# Patient Record
Sex: FEMALE | Race: WHITE | NOT HISPANIC OR LATINO | Employment: OTHER | URBAN - METROPOLITAN AREA
[De-identification: names, ages, dates, MRNs, and addresses within clinical notes are randomized per-mention and may not be internally consistent; named-entity substitution may affect disease eponyms.]

---

## 2017-01-18 ENCOUNTER — TRANSCRIBE ORDERS (OUTPATIENT)
Dept: ADMINISTRATIVE | Facility: HOSPITAL | Age: 82
End: 2017-01-18

## 2017-01-18 DIAGNOSIS — R13.10 DYSPHAGIA, UNSPECIFIED TYPE: Primary | ICD-10-CM

## 2017-01-27 ENCOUNTER — HOSPITAL ENCOUNTER (OUTPATIENT)
Dept: GENERAL RADIOLOGY | Facility: HOSPITAL | Age: 82
Discharge: HOME | End: 2017-01-27
Attending: INTERNAL MEDICINE | Admitting: INTERNAL MEDICINE

## 2017-01-27 DIAGNOSIS — R13.10 DYSPHAGIA, UNSPECIFIED TYPE: ICD-10-CM

## 2017-01-27 PROCEDURE — 74247: CPT

## 2017-02-06 DIAGNOSIS — R93.89 ABNORMAL FINDING ON RADIOLOGY EXAM: Primary | ICD-10-CM

## 2017-02-13 ENCOUNTER — ANESTHESIA (OUTPATIENT)
Dept: GASTROENTEROLOGY | Facility: HOSPITAL | Age: 82
End: 2017-02-13

## 2017-02-13 ENCOUNTER — ANESTHESIA EVENT (OUTPATIENT)
Dept: GASTROENTEROLOGY | Facility: HOSPITAL | Age: 82
End: 2017-02-13

## 2017-02-13 ENCOUNTER — HOSPITAL ENCOUNTER (OUTPATIENT)
Facility: HOSPITAL | Age: 82
Setting detail: HOSPITAL OUTPATIENT SURGERY
Discharge: HOME | End: 2017-02-13
Attending: INTERNAL MEDICINE | Admitting: INTERNAL MEDICINE

## 2017-02-13 VITALS
SYSTOLIC BLOOD PRESSURE: 151 MMHG | WEIGHT: 167.5 LBS | TEMPERATURE: 97.6 F | DIASTOLIC BLOOD PRESSURE: 71 MMHG | BODY MASS INDEX: 25.39 KG/M2 | RESPIRATION RATE: 16 BRPM | HEIGHT: 68 IN | OXYGEN SATURATION: 95 % | HEART RATE: 64 BPM

## 2017-02-13 DIAGNOSIS — R93.89 ABNORMAL FINDING ON RADIOLOGY EXAM: ICD-10-CM

## 2017-02-13 PROCEDURE — 43239 EGD BIOPSY SINGLE/MULTIPLE: CPT | Performed by: INTERNAL MEDICINE

## 2017-02-13 PROCEDURE — 88305 TISSUE EXAM BY PATHOLOGIST: CPT | Performed by: INTERNAL MEDICINE

## 2017-02-13 PROCEDURE — 43450 DILATE ESOPHAGUS 1/MULT PASS: CPT | Performed by: INTERNAL MEDICINE

## 2017-02-13 PROCEDURE — 88312 SPECIAL STAINS GROUP 1: CPT | Performed by: INTERNAL MEDICINE

## 2017-02-13 PROCEDURE — 25010000002 PROPOFOL 10 MG/ML EMULSION: Performed by: NURSE ANESTHETIST, CERTIFIED REGISTERED

## 2017-02-13 RX ORDER — SODIUM CHLORIDE, SODIUM LACTATE, POTASSIUM CHLORIDE, CALCIUM CHLORIDE 600; 310; 30; 20 MG/100ML; MG/100ML; MG/100ML; MG/100ML
1000 INJECTION, SOLUTION INTRAVENOUS CONTINUOUS PRN
Status: DISCONTINUED | OUTPATIENT
Start: 2017-02-13 | End: 2017-02-13 | Stop reason: HOSPADM

## 2017-02-13 RX ORDER — OMEPRAZOLE 20 MG/1
20 CAPSULE, DELAYED RELEASE ORAL
Qty: 60 CAPSULE | Refills: 3 | Status: SHIPPED | OUTPATIENT
Start: 2017-02-13 | End: 2017-06-19 | Stop reason: SDUPTHER

## 2017-02-13 RX ORDER — PROPOFOL 10 MG/ML
VIAL (ML) INTRAVENOUS CONTINUOUS PRN
Status: DISCONTINUED | OUTPATIENT
Start: 2017-02-13 | End: 2017-02-13 | Stop reason: SURG

## 2017-02-13 RX ORDER — LIDOCAINE HYDROCHLORIDE 20 MG/ML
INJECTION, SOLUTION INFILTRATION; PERINEURAL AS NEEDED
Status: DISCONTINUED | OUTPATIENT
Start: 2017-02-13 | End: 2017-02-13 | Stop reason: SURG

## 2017-02-13 RX ADMIN — PROPOFOL 150 MCG/KG/MIN: 10 INJECTION, EMULSION INTRAVENOUS at 12:20

## 2017-02-13 RX ADMIN — SODIUM CHLORIDE, POTASSIUM CHLORIDE, SODIUM LACTATE AND CALCIUM CHLORIDE 1000 ML: 600; 310; 30; 20 INJECTION, SOLUTION INTRAVENOUS at 12:00

## 2017-02-13 RX ADMIN — LIDOCAINE HYDROCHLORIDE 40 MG: 20 INJECTION, SOLUTION INFILTRATION; PERINEURAL at 12:19

## 2017-02-14 LAB
CYTO UR: NORMAL
LAB AP CASE REPORT: NORMAL
Lab: NORMAL
PATH REPORT.FINAL DX SPEC: NORMAL
PATH REPORT.GROSS SPEC: NORMAL

## 2017-02-15 ENCOUNTER — TELEPHONE (OUTPATIENT)
Dept: GASTROENTEROLOGY | Facility: CLINIC | Age: 82
End: 2017-02-15

## 2017-02-17 ENCOUNTER — DOCUMENTATION (OUTPATIENT)
Dept: GASTROENTEROLOGY | Facility: CLINIC | Age: 82
End: 2017-02-17

## 2017-03-27 ENCOUNTER — OFFICE VISIT (OUTPATIENT)
Dept: GASTROENTEROLOGY | Facility: CLINIC | Age: 82
End: 2017-03-27

## 2017-03-27 VITALS
BODY MASS INDEX: 25.73 KG/M2 | DIASTOLIC BLOOD PRESSURE: 74 MMHG | WEIGHT: 169.8 LBS | HEIGHT: 68 IN | SYSTOLIC BLOOD PRESSURE: 148 MMHG

## 2017-03-27 DIAGNOSIS — R09.89 GLOBUS SENSATION: Primary | ICD-10-CM

## 2017-03-27 DIAGNOSIS — K21.9 GASTROESOPHAGEAL REFLUX DISEASE WITHOUT ESOPHAGITIS: ICD-10-CM

## 2017-03-27 PROCEDURE — 99213 OFFICE O/P EST LOW 20 MIN: CPT | Performed by: NURSE PRACTITIONER

## 2017-06-19 ENCOUNTER — OFFICE VISIT (OUTPATIENT)
Dept: GASTROENTEROLOGY | Facility: CLINIC | Age: 82
End: 2017-06-19

## 2017-06-19 VITALS
SYSTOLIC BLOOD PRESSURE: 116 MMHG | WEIGHT: 170.8 LBS | TEMPERATURE: 98.3 F | BODY MASS INDEX: 25.88 KG/M2 | HEIGHT: 68 IN | DIASTOLIC BLOOD PRESSURE: 64 MMHG

## 2017-06-19 DIAGNOSIS — R09.89 GLOBUS SENSATION: ICD-10-CM

## 2017-06-19 DIAGNOSIS — R49.0 HOARSE VOICE QUALITY: ICD-10-CM

## 2017-06-19 DIAGNOSIS — K21.9 GASTROESOPHAGEAL REFLUX DISEASE WITHOUT ESOPHAGITIS: Primary | ICD-10-CM

## 2017-06-19 PROCEDURE — 99213 OFFICE O/P EST LOW 20 MIN: CPT | Performed by: NURSE PRACTITIONER

## 2017-06-19 RX ORDER — DICYCLOMINE HYDROCHLORIDE 10 MG/1
10 CAPSULE ORAL
Qty: 20 CAPSULE | Refills: 3 | Status: SHIPPED | OUTPATIENT
Start: 2017-06-19 | End: 2019-08-21

## 2017-06-19 RX ORDER — OMEPRAZOLE 20 MG/1
20 CAPSULE, DELAYED RELEASE ORAL 2 TIMES DAILY
Qty: 60 CAPSULE | Refills: 6 | Status: SHIPPED | OUTPATIENT
Start: 2017-06-19

## 2017-07-26 ENCOUNTER — APPOINTMENT (OUTPATIENT)
Dept: WOMENS IMAGING | Facility: HOSPITAL | Age: 82
End: 2017-07-26

## 2017-07-26 PROCEDURE — 77080 DXA BONE DENSITY AXIAL: CPT | Performed by: RADIOLOGY

## 2017-12-18 ENCOUNTER — OFFICE VISIT (OUTPATIENT)
Dept: GASTROENTEROLOGY | Facility: CLINIC | Age: 82
End: 2017-12-18

## 2017-12-18 VITALS
TEMPERATURE: 97.7 F | HEIGHT: 68 IN | WEIGHT: 173.6 LBS | DIASTOLIC BLOOD PRESSURE: 66 MMHG | SYSTOLIC BLOOD PRESSURE: 136 MMHG | BODY MASS INDEX: 26.31 KG/M2

## 2017-12-18 DIAGNOSIS — R09.89 GLOBUS SENSATION: ICD-10-CM

## 2017-12-18 DIAGNOSIS — K21.9 GASTROESOPHAGEAL REFLUX DISEASE WITHOUT ESOPHAGITIS: Primary | ICD-10-CM

## 2017-12-18 PROCEDURE — 99212 OFFICE O/P EST SF 10 MIN: CPT | Performed by: INTERNAL MEDICINE

## 2017-12-19 ENCOUNTER — OFFICE VISIT (OUTPATIENT)
Dept: ORTHOPEDIC SURGERY | Facility: CLINIC | Age: 82
End: 2017-12-19

## 2017-12-19 VITALS — TEMPERATURE: 98.1 F | HEIGHT: 68 IN | BODY MASS INDEX: 26.52 KG/M2 | WEIGHT: 175 LBS

## 2017-12-19 DIAGNOSIS — M25.551 RIGHT HIP PAIN: Primary | ICD-10-CM

## 2017-12-19 PROCEDURE — 99213 OFFICE O/P EST LOW 20 MIN: CPT | Performed by: ORTHOPAEDIC SURGERY

## 2017-12-19 PROCEDURE — 73502 X-RAY EXAM HIP UNI 2-3 VIEWS: CPT | Performed by: ORTHOPAEDIC SURGERY

## 2017-12-19 RX ORDER — SULFAMETHOXAZOLE AND TRIMETHOPRIM 800; 160 MG/1; MG/1
TABLET ORAL
Refills: 0 | COMMUNITY
Start: 2017-12-18 | End: 2019-08-21

## 2017-12-19 RX ORDER — AMITRIPTYLINE HYDROCHLORIDE 10 MG/1
TABLET, FILM COATED ORAL
Refills: 0 | COMMUNITY
Start: 2017-11-16 | End: 2019-08-21

## 2018-07-24 ENCOUNTER — APPOINTMENT (OUTPATIENT)
Dept: WOMENS IMAGING | Facility: HOSPITAL | Age: 83
End: 2018-07-24

## 2018-07-24 PROCEDURE — 77063 BREAST TOMOSYNTHESIS BI: CPT | Performed by: RADIOLOGY

## 2018-07-24 PROCEDURE — 77067 SCR MAMMO BI INCL CAD: CPT | Performed by: RADIOLOGY

## 2019-07-31 ENCOUNTER — APPOINTMENT (OUTPATIENT)
Dept: WOMENS IMAGING | Facility: HOSPITAL | Age: 84
End: 2019-07-31

## 2019-07-31 PROCEDURE — 77080 DXA BONE DENSITY AXIAL: CPT | Performed by: RADIOLOGY

## 2019-07-31 PROCEDURE — 77067 SCR MAMMO BI INCL CAD: CPT | Performed by: RADIOLOGY

## 2019-07-31 PROCEDURE — 77063 BREAST TOMOSYNTHESIS BI: CPT | Performed by: RADIOLOGY

## 2019-08-15 ENCOUNTER — TRANSCRIBE ORDERS (OUTPATIENT)
Dept: ADMINISTRATIVE | Facility: HOSPITAL | Age: 84
End: 2019-08-15

## 2019-08-15 DIAGNOSIS — M81.0 OSTEOPOROSIS, POSTMENOPAUSAL: Primary | ICD-10-CM

## 2019-08-20 PROBLEM — M85.89 OSTEOPENIA OF MULTIPLE SITES: Status: ACTIVE | Noted: 2019-08-20

## 2019-08-20 RX ORDER — ZOLEDRONIC ACID 5 MG/100ML
5 INJECTION, SOLUTION INTRAVENOUS ONCE
Status: CANCELLED | OUTPATIENT
Start: 2019-08-21

## 2019-08-21 ENCOUNTER — HOSPITAL ENCOUNTER (OUTPATIENT)
Dept: INFUSION THERAPY | Facility: HOSPITAL | Age: 84
Discharge: HOME | End: 2019-08-21
Admitting: INTERNAL MEDICINE

## 2019-08-21 VITALS
TEMPERATURE: 98.2 F | BODY MASS INDEX: 25.81 KG/M2 | HEART RATE: 71 BPM | DIASTOLIC BLOOD PRESSURE: 62 MMHG | SYSTOLIC BLOOD PRESSURE: 137 MMHG | WEIGHT: 160 LBS | OXYGEN SATURATION: 95 % | RESPIRATION RATE: 20 BRPM

## 2019-08-21 DIAGNOSIS — M85.89 OSTEOPENIA OF MULTIPLE SITES: Primary | ICD-10-CM

## 2019-08-21 PROCEDURE — 25010000002 ZOLEDRONIC ACID 5 MG/100ML SOLUTION: Performed by: INTERNAL MEDICINE

## 2019-08-21 PROCEDURE — 96374 THER/PROPH/DIAG INJ IV PUSH: CPT

## 2019-08-21 RX ORDER — MELATONIN
1000 DAILY
COMMUNITY

## 2019-08-21 RX ORDER — ZOLEDRONIC ACID 5 MG/100ML
5 INJECTION, SOLUTION INTRAVENOUS ONCE
Status: CANCELLED | OUTPATIENT
Start: 2019-08-21

## 2019-08-21 RX ORDER — ZOLEDRONIC ACID 5 MG/100ML
5 INJECTION, SOLUTION INTRAVENOUS ONCE
COMMUNITY

## 2019-08-21 RX ORDER — CARBOXYMETHYLCELLULOSE SODIUM 5 MG/ML
1 SOLUTION/ DROPS OPHTHALMIC 2 TIMES DAILY PRN
COMMUNITY

## 2019-08-21 RX ORDER — ZOLEDRONIC ACID 5 MG/100ML
5 INJECTION, SOLUTION INTRAVENOUS ONCE
Status: COMPLETED | OUTPATIENT
Start: 2019-08-21 | End: 2019-08-21

## 2019-08-21 RX ORDER — GABAPENTIN 300 MG/1
300 CAPSULE ORAL DAILY
COMMUNITY

## 2019-08-21 RX ADMIN — ZOLEDRONIC ACID 5 MG: 5 INJECTION, SOLUTION INTRAVENOUS at 10:26

## 2021-04-28 ENCOUNTER — APPOINTMENT (OUTPATIENT)
Dept: WOMENS IMAGING | Facility: HOSPITAL | Age: 86
End: 2021-04-28

## 2021-04-28 PROCEDURE — 77067 SCR MAMMO BI INCL CAD: CPT | Performed by: RADIOLOGY

## 2021-04-28 PROCEDURE — 77063 BREAST TOMOSYNTHESIS BI: CPT | Performed by: RADIOLOGY

## 2021-06-24 ENCOUNTER — TRANSCRIBE ORDERS (OUTPATIENT)
Dept: ADMINISTRATIVE | Facility: HOSPITAL | Age: 86
End: 2021-06-24

## 2021-06-24 DIAGNOSIS — M81.0 SENILE OSTEOPOROSIS: Primary | ICD-10-CM

## 2021-08-20 RX ORDER — ZOLEDRONIC ACID 5 MG/100ML
5 INJECTION, SOLUTION INTRAVENOUS ONCE
Status: CANCELLED | OUTPATIENT
Start: 2021-08-20

## 2021-08-24 ENCOUNTER — HOSPITAL ENCOUNTER (OUTPATIENT)
Dept: INFUSION THERAPY | Facility: HOSPITAL | Age: 86
Discharge: HOME | End: 2021-08-24
Admitting: INTERNAL MEDICINE

## 2021-08-24 VITALS
SYSTOLIC BLOOD PRESSURE: 143 MMHG | WEIGHT: 167 LBS | TEMPERATURE: 96.8 F | HEART RATE: 77 BPM | OXYGEN SATURATION: 92 % | RESPIRATION RATE: 18 BRPM | DIASTOLIC BLOOD PRESSURE: 70 MMHG | BODY MASS INDEX: 26.93 KG/M2

## 2021-08-24 DIAGNOSIS — M85.89 OSTEOPENIA OF MULTIPLE SITES: Primary | ICD-10-CM

## 2021-08-24 PROCEDURE — 25010000002 ZOLEDRONIC ACID 5 MG/100ML SOLUTION: Performed by: INTERNAL MEDICINE

## 2021-08-24 PROCEDURE — 96365 THER/PROPH/DIAG IV INF INIT: CPT

## 2021-08-24 RX ORDER — ZOLEDRONIC ACID 5 MG/100ML
5 INJECTION, SOLUTION INTRAVENOUS ONCE
Status: COMPLETED | OUTPATIENT
Start: 2021-08-24 | End: 2021-08-24

## 2021-08-24 RX ORDER — ZOLEDRONIC ACID 5 MG/100ML
5 INJECTION, SOLUTION INTRAVENOUS ONCE
OUTPATIENT
Start: 2022-08-24

## 2021-08-24 RX ADMIN — ZOLEDRONIC ACID 5 MG: 0.05 INJECTION, SOLUTION INTRAVENOUS at 09:42

## 2022-05-13 ENCOUNTER — HOSPITAL ENCOUNTER (EMERGENCY)
Facility: HOSPITAL | Age: 87
Discharge: HOME | End: 2022-05-13
Attending: EMERGENCY MEDICINE | Admitting: EMERGENCY MEDICINE

## 2022-05-13 ENCOUNTER — APPOINTMENT (OUTPATIENT)
Dept: CT IMAGING | Facility: HOSPITAL | Age: 87
End: 2022-05-13

## 2022-05-13 VITALS
HEART RATE: 70 BPM | SYSTOLIC BLOOD PRESSURE: 158 MMHG | OXYGEN SATURATION: 98 % | RESPIRATION RATE: 16 BRPM | DIASTOLIC BLOOD PRESSURE: 76 MMHG | TEMPERATURE: 98 F

## 2022-05-13 DIAGNOSIS — E78.5 HYPERLIPIDEMIA, UNSPECIFIED HYPERLIPIDEMIA TYPE: ICD-10-CM
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
## 2022-05-13 DIAGNOSIS — S09.90XA MINOR HEAD INJURY, INITIAL ENCOUNTER: Primary | ICD-10-CM

## 2022-05-13 DIAGNOSIS — K21.9 GASTROESOPHAGEAL REFLUX DISEASE WITHOUT ESOPHAGITIS: ICD-10-CM

## 2022-05-13 DIAGNOSIS — I10 PRIMARY HYPERTENSION: ICD-10-CM

## 2022-05-13 LAB
ALBUMIN SERPL-MCNC: 3.9 G/DL (ref 3.5–5.2)
ALBUMIN/GLOB SERPL: 1.6 G/DL
ALP SERPL-CCNC: 39 U/L (ref 39–117)
ALT SERPL W P-5'-P-CCNC: 11 U/L (ref 1–33)
ANION GAP SERPL CALCULATED.3IONS-SCNC: 11.8 MMOL/L (ref 5–15)
AST SERPL-CCNC: 18 U/L (ref 1–32)
BASOPHILS # BLD AUTO: 0.03 10*3/MM3 (ref 0–0.2)
BASOPHILS NFR BLD AUTO: 0.5 % (ref 0–1.5)
BILIRUB SERPL-MCNC: 0.3 MG/DL (ref 0–1.2)
BUN SERPL-MCNC: 13 MG/DL (ref 8–23)
BUN/CREAT SERPL: 22 (ref 7–25)
CALCIUM SPEC-SCNC: 9 MG/DL (ref 8.6–10.5)
CHLORIDE SERPL-SCNC: 97 MMOL/L (ref 98–107)
CO2 SERPL-SCNC: 24.2 MMOL/L (ref 22–29)
CREAT SERPL-MCNC: 0.59 MG/DL (ref 0.57–1)
DEPRECATED RDW RBC AUTO: 45.8 FL (ref 37–54)
EGFRCR SERPLBLD CKD-EPI 2021: 86.3 ML/MIN/1.73
EOSINOPHIL # BLD AUTO: 0.15 10*3/MM3 (ref 0–0.4)
EOSINOPHIL NFR BLD AUTO: 2.6 % (ref 0.3–6.2)
ERYTHROCYTE [DISTWIDTH] IN BLOOD BY AUTOMATED COUNT: 12.7 % (ref 12.3–15.4)
ETHANOL BLD-MCNC: 61 MG/DL (ref 0–10)
ETHANOL UR QL: 0.06 %
GLOBULIN UR ELPH-MCNC: 2.5 GM/DL
GLUCOSE SERPL-MCNC: 88 MG/DL (ref 65–99)
HCT VFR BLD AUTO: 39.9 % (ref 34–46.6)
HGB BLD-MCNC: 12.9 G/DL (ref 12–15.9)
IMM GRANULOCYTES # BLD AUTO: 0.02 10*3/MM3 (ref 0–0.05)
IMM GRANULOCYTES NFR BLD AUTO: 0.3 % (ref 0–0.5)
LYMPHOCYTES # BLD AUTO: 1.39 10*3/MM3 (ref 0.7–3.1)
LYMPHOCYTES NFR BLD AUTO: 23.7 % (ref 19.6–45.3)
MCH RBC QN AUTO: 31.7 PG (ref 26.6–33)
MCHC RBC AUTO-ENTMCNC: 32.3 G/DL (ref 31.5–35.7)
MCV RBC AUTO: 98 FL (ref 79–97)
MONOCYTES # BLD AUTO: 0.64 10*3/MM3 (ref 0.1–0.9)
MONOCYTES NFR BLD AUTO: 10.9 % (ref 5–12)
NEUTROPHILS NFR BLD AUTO: 3.63 10*3/MM3 (ref 1.7–7)
NEUTROPHILS NFR BLD AUTO: 62 % (ref 42.7–76)
NRBC BLD AUTO-RTO: 0 /100 WBC (ref 0–0.2)
PLATELET # BLD AUTO: 205 10*3/MM3 (ref 140–450)
PMV BLD AUTO: 11 FL (ref 6–12)
POTASSIUM SERPL-SCNC: 3.8 MMOL/L (ref 3.5–5.2)
PROT SERPL-MCNC: 6.4 G/DL (ref 6–8.5)
RBC # BLD AUTO: 4.07 10*6/MM3 (ref 3.77–5.28)
SODIUM SERPL-SCNC: 133 MMOL/L (ref 136–145)
TROPONIN T SERPL-MCNC: <0.01 NG/ML (ref 0–0.03)
WBC NRBC COR # BLD: 5.86 10*3/MM3 (ref 3.4–10.8)

## 2022-05-13 PROCEDURE — 70450 CT HEAD/BRAIN W/O DYE: CPT

## 2022-05-13 PROCEDURE — 99283 EMERGENCY DEPT VISIT LOW MDM: CPT

## 2022-05-13 PROCEDURE — 84484 ASSAY OF TROPONIN QUANT: CPT | Performed by: PHYSICIAN ASSISTANT

## 2022-05-13 PROCEDURE — 82077 ASSAY SPEC XCP UR&BREATH IA: CPT | Performed by: PHYSICIAN ASSISTANT

## 2022-05-13 PROCEDURE — 85025 COMPLETE CBC W/AUTO DIFF WBC: CPT | Performed by: PHYSICIAN ASSISTANT

## 2022-05-13 PROCEDURE — 93010 ELECTROCARDIOGRAM REPORT: CPT | Performed by: INTERNAL MEDICINE

## 2022-05-13 PROCEDURE — 80053 COMPREHEN METABOLIC PANEL: CPT | Performed by: PHYSICIAN ASSISTANT

## 2022-05-13 PROCEDURE — 93005 ELECTROCARDIOGRAM TRACING: CPT | Performed by: PHYSICIAN ASSISTANT

## 2022-05-13 PROCEDURE — 36415 COLL VENOUS BLD VENIPUNCTURE: CPT

## 2022-05-13 RX ORDER — SODIUM CHLORIDE 0.9 % (FLUSH) 0.9 %
10 SYRINGE (ML) INJECTION AS NEEDED
Status: DISCONTINUED | OUTPATIENT
Start: 2022-05-13 | End: 2022-05-14 | Stop reason: HOSPADM

## 2022-05-13 RX ADMIN — SODIUM CHLORIDE 500 ML: 9 INJECTION, SOLUTION INTRAVENOUS at 21:38

## 2022-05-14 LAB — QT INTERVAL: 407 MS

## 2022-05-16 ENCOUNTER — TRANSCRIBE ORDERS (OUTPATIENT)
Dept: ADMINISTRATIVE | Facility: HOSPITAL | Age: 87
End: 2022-05-16

## 2022-05-16 DIAGNOSIS — R55 SYNCOPE AND COLLAPSE: Primary | ICD-10-CM

## 2022-05-20 ENCOUNTER — HOSPITAL ENCOUNTER (OUTPATIENT)
Dept: CARDIOLOGY | Facility: HOSPITAL | Age: 87
Discharge: HOME | End: 2022-05-20
Admitting: INTERNAL MEDICINE

## 2022-05-20 DIAGNOSIS — R55 SYNCOPE AND COLLAPSE: ICD-10-CM

## 2022-05-20 PROCEDURE — 93225 XTRNL ECG REC<48 HRS REC: CPT

## 2022-05-20 PROCEDURE — 93226 XTRNL ECG REC<48 HR SCAN A/R: CPT

## 2022-05-26 LAB
MAXIMAL PREDICTED HEART RATE: 131 BPM
STRESS TARGET HR: 111 BPM

## 2022-05-26 PROCEDURE — 93227 XTRNL ECG REC<48 HR R&I: CPT | Performed by: INTERNAL MEDICINE

## 2022-06-05 ENCOUNTER — APPOINTMENT (OUTPATIENT)
Dept: MRI IMAGING | Facility: HOSPITAL | Age: 87
End: 2022-06-05

## 2022-07-01 ENCOUNTER — APPOINTMENT (OUTPATIENT)
Dept: WOMENS IMAGING | Facility: HOSPITAL | Age: 87
End: 2022-07-01

## 2022-07-01 PROCEDURE — 77067 SCR MAMMO BI INCL CAD: CPT | Performed by: RADIOLOGY

## 2022-07-01 PROCEDURE — 77063 BREAST TOMOSYNTHESIS BI: CPT | Performed by: RADIOLOGY

## 2022-11-16 ENCOUNTER — HOSPITAL ENCOUNTER (OUTPATIENT)
Dept: PET IMAGING | Facility: HOSPITAL | Age: 87
Discharge: HOME | End: 2022-11-16
Admitting: INTERNAL MEDICINE

## 2022-11-16 ENCOUNTER — TRANSCRIBE ORDERS (OUTPATIENT)
Dept: WOMENS IMAGING | Facility: HOSPITAL | Age: 87
End: 2022-11-16

## 2022-11-16 DIAGNOSIS — M81.0 HIGH RISK FOR FRACTURE DUE TO OSTEOPOROSIS BY DEXA SCAN: Primary | ICD-10-CM

## 2022-11-16 PROCEDURE — 77080 DXA BONE DENSITY AXIAL: CPT | Performed by: RADIOLOGY

## 2022-11-16 PROCEDURE — 77080 DXA BONE DENSITY AXIAL: CPT

## 2023-01-26 ENCOUNTER — OFFICE VISIT (OUTPATIENT)
Dept: ORTHOPEDIC SURGERY | Facility: CLINIC | Age: 88
End: 2023-01-26
Payer: MEDICARE

## 2023-01-26 VITALS — WEIGHT: 165 LBS | TEMPERATURE: 96.5 F | BODY MASS INDEX: 26.52 KG/M2 | RESPIRATION RATE: 16 BRPM | HEIGHT: 66 IN

## 2023-01-26 DIAGNOSIS — M25.551 RIGHT HIP PAIN: ICD-10-CM

## 2023-01-26 DIAGNOSIS — M54.16 LUMBAR RADICULOPATHY: ICD-10-CM

## 2023-01-26 DIAGNOSIS — M25.561 CHRONIC PAIN OF RIGHT KNEE: ICD-10-CM

## 2023-01-26 DIAGNOSIS — G89.29 CHRONIC PAIN OF RIGHT KNEE: ICD-10-CM

## 2023-01-26 DIAGNOSIS — R52 PAIN: Primary | ICD-10-CM

## 2023-01-26 PROCEDURE — 99203 OFFICE O/P NEW LOW 30 MIN: CPT | Performed by: NURSE PRACTITIONER

## 2023-01-26 PROCEDURE — 73562 X-RAY EXAM OF KNEE 3: CPT | Performed by: NURSE PRACTITIONER

## 2023-01-26 RX ORDER — METHYLPREDNISOLONE 4 MG/1
TABLET ORAL
Qty: 21 TABLET | Refills: 0 | Status: SHIPPED | OUTPATIENT
Start: 2023-01-26

## 2023-02-10 ENCOUNTER — TELEPHONE (OUTPATIENT)
Dept: ORTHOPEDIC SURGERY | Facility: CLINIC | Age: 88
End: 2023-02-10

## 2023-02-14 ENCOUNTER — TELEPHONE (OUTPATIENT)
Dept: ORTHOPEDIC SURGERY | Facility: CLINIC | Age: 88
End: 2023-02-14

## 2023-02-14 DIAGNOSIS — M54.16 LUMBAR RADICULOPATHY: Primary | ICD-10-CM

## 2023-02-20 ENCOUNTER — TELEPHONE (OUTPATIENT)
Dept: ORTHOPEDIC SURGERY | Facility: CLINIC | Age: 88
End: 2023-02-20
Payer: MEDICARE

## 2023-02-21 ENCOUNTER — TELEPHONE (OUTPATIENT)
Dept: ORTHOPEDIC SURGERY | Facility: CLINIC | Age: 88
End: 2023-02-21

## 2023-02-21 DIAGNOSIS — M54.16 LUMBAR RADICULOPATHY: Primary | ICD-10-CM

## 2023-02-24 ENCOUNTER — OFFICE VISIT (OUTPATIENT)
Dept: ORTHOPEDIC SURGERY | Facility: CLINIC | Age: 88
End: 2023-02-24
Payer: MEDICARE

## 2023-02-24 VITALS — TEMPERATURE: 97.3 F | WEIGHT: 171.2 LBS | BODY MASS INDEX: 27.51 KG/M2 | HEIGHT: 66 IN

## 2023-02-24 DIAGNOSIS — M54.16 LUMBAR RADICULOPATHY: Primary | ICD-10-CM

## 2023-02-24 DIAGNOSIS — M54.16 LUMBAR RADICULOPATHY: ICD-10-CM

## 2023-02-24 DIAGNOSIS — M48.061 LUMBAR STENOSIS WITHOUT NEUROGENIC CLAUDICATION: ICD-10-CM

## 2023-02-24 PROCEDURE — 99213 OFFICE O/P EST LOW 20 MIN: CPT | Performed by: NURSE PRACTITIONER

## 2023-03-07 ENCOUNTER — HOSPITAL ENCOUNTER (OUTPATIENT)
Dept: GENERAL RADIOLOGY | Facility: HOSPITAL | Age: 88
Discharge: HOME | End: 2023-03-07
Payer: MEDICARE

## 2023-03-07 ENCOUNTER — HOSPITAL ENCOUNTER (OUTPATIENT)
Dept: PAIN MEDICINE | Facility: HOSPITAL | Age: 88
Discharge: HOME | End: 2023-03-07
Payer: MEDICARE

## 2023-03-07 ENCOUNTER — ANESTHESIA EVENT (OUTPATIENT)
Dept: PAIN MEDICINE | Facility: HOSPITAL | Age: 88
End: 2023-03-07
Payer: MEDICARE

## 2023-03-07 ENCOUNTER — ANESTHESIA (OUTPATIENT)
Dept: PAIN MEDICINE | Facility: HOSPITAL | Age: 88
End: 2023-03-07
Payer: MEDICARE

## 2023-03-07 VITALS
BODY MASS INDEX: 25.9 KG/M2 | WEIGHT: 165 LBS | OXYGEN SATURATION: 97 % | TEMPERATURE: 97.1 F | HEART RATE: 76 BPM | RESPIRATION RATE: 16 BRPM | DIASTOLIC BLOOD PRESSURE: 61 MMHG | SYSTOLIC BLOOD PRESSURE: 155 MMHG | HEIGHT: 67 IN

## 2023-03-07 DIAGNOSIS — R52 PAIN: ICD-10-CM

## 2023-03-07 DIAGNOSIS — M54.16 LUMBAR RADICULOPATHY: Primary | ICD-10-CM

## 2023-03-07 PROCEDURE — 77003 FLUOROGUIDE FOR SPINE INJECT: CPT

## 2023-03-07 PROCEDURE — 25510000001 IOPAMIDOL 41 % SOLUTION

## 2023-03-07 PROCEDURE — 25010000002 METHYLPREDNISOLONE PER 80 MG

## 2023-03-07 RX ORDER — SODIUM CHLORIDE 0.9 % (FLUSH) 0.9 %
3 SYRINGE (ML) INJECTION EVERY 12 HOURS SCHEDULED
Status: DISCONTINUED | OUTPATIENT
Start: 2023-03-07 | End: 2023-03-08 | Stop reason: HOSPADM

## 2023-03-07 RX ORDER — METHYLPREDNISOLONE ACETATE 80 MG/ML
INJECTION, SUSPENSION INTRA-ARTICULAR; INTRALESIONAL; INTRAMUSCULAR; SOFT TISSUE
Status: COMPLETED
Start: 2023-03-07 | End: 2023-03-07

## 2023-03-07 RX ORDER — LIDOCAINE HYDROCHLORIDE 10 MG/ML
1 INJECTION, SOLUTION INFILTRATION; PERINEURAL ONCE
Status: DISCONTINUED | OUTPATIENT
Start: 2023-03-07 | End: 2023-03-08 | Stop reason: HOSPADM

## 2023-03-07 RX ORDER — MIDAZOLAM HYDROCHLORIDE 1 MG/ML
1 INJECTION INTRAMUSCULAR; INTRAVENOUS ONCE AS NEEDED
Status: DISCONTINUED | OUTPATIENT
Start: 2023-03-07 | End: 2023-03-08 | Stop reason: HOSPADM

## 2023-03-07 RX ORDER — SODIUM CHLORIDE 0.9 % (FLUSH) 0.9 %
10 SYRINGE (ML) INJECTION AS NEEDED
Status: DISCONTINUED | OUTPATIENT
Start: 2023-03-07 | End: 2023-03-08 | Stop reason: HOSPADM

## 2023-03-07 RX ORDER — FENTANYL CITRATE 50 UG/ML
50 INJECTION, SOLUTION INTRAMUSCULAR; INTRAVENOUS ONCE
Status: DISCONTINUED | OUTPATIENT
Start: 2023-03-07 | End: 2023-03-08 | Stop reason: HOSPADM

## 2023-03-07 RX ORDER — METHYLPREDNISOLONE ACETATE 80 MG/ML
80 INJECTION, SUSPENSION INTRA-ARTICULAR; INTRALESIONAL; INTRAMUSCULAR; SOFT TISSUE ONCE
Status: DISCONTINUED | OUTPATIENT
Start: 2023-03-07 | End: 2023-03-08 | Stop reason: HOSPADM

## 2023-03-07 RX ADMIN — METHYLPREDNISOLONE ACETATE 80 MG: 80 INJECTION, SUSPENSION INTRA-ARTICULAR; INTRALESIONAL; INTRAMUSCULAR; SOFT TISSUE at 13:25

## 2023-03-07 RX ADMIN — IOPAMIDOL 10 ML: 408 INJECTION, SOLUTION INTRATHECAL at 13:24

## 2023-06-07 ENCOUNTER — HOSPITAL ENCOUNTER (OUTPATIENT)
Dept: GENERAL RADIOLOGY | Facility: HOSPITAL | Age: 88
Discharge: HOME | End: 2023-06-07
Payer: MEDICARE

## 2023-06-07 ENCOUNTER — ANESTHESIA (OUTPATIENT)
Dept: PAIN MEDICINE | Facility: HOSPITAL | Age: 88
End: 2023-06-07
Payer: MEDICARE

## 2023-06-07 ENCOUNTER — ANESTHESIA EVENT (OUTPATIENT)
Dept: PAIN MEDICINE | Facility: HOSPITAL | Age: 88
End: 2023-06-07
Payer: MEDICARE

## 2023-06-07 ENCOUNTER — HOSPITAL ENCOUNTER (OUTPATIENT)
Dept: PAIN MEDICINE | Facility: HOSPITAL | Age: 88
Discharge: HOME | End: 2023-06-07
Payer: MEDICARE

## 2023-06-07 VITALS
OXYGEN SATURATION: 96 % | SYSTOLIC BLOOD PRESSURE: 156 MMHG | DIASTOLIC BLOOD PRESSURE: 61 MMHG | TEMPERATURE: 98 F | RESPIRATION RATE: 16 BRPM | HEART RATE: 61 BPM

## 2023-06-07 DIAGNOSIS — R52 PAIN: ICD-10-CM

## 2023-06-07 DIAGNOSIS — M54.16 LUMBAR RADICULOPATHY: ICD-10-CM

## 2023-06-07 PROCEDURE — 25510000001 IOPAMIDOL 41 % SOLUTION: Performed by: ANESTHESIOLOGY

## 2023-06-07 PROCEDURE — 77003 FLUOROGUIDE FOR SPINE INJECT: CPT

## 2023-06-07 PROCEDURE — 25010000002 DEXAMETHASONE SODIUM PHOSPHATE 10 MG/ML SOLUTION: Performed by: ANESTHESIOLOGY

## 2023-06-07 RX ORDER — MIDAZOLAM HYDROCHLORIDE 1 MG/ML
1 INJECTION INTRAMUSCULAR; INTRAVENOUS ONCE AS NEEDED
Status: DISCONTINUED | OUTPATIENT
Start: 2023-06-07 | End: 2023-06-08 | Stop reason: HOSPADM

## 2023-06-07 RX ORDER — DEXAMETHASONE SODIUM PHOSPHATE 10 MG/ML
10 INJECTION, SOLUTION INTRAMUSCULAR; INTRAVENOUS ONCE
Status: COMPLETED | OUTPATIENT
Start: 2023-06-07 | End: 2023-06-07

## 2023-06-07 RX ORDER — FENTANYL CITRATE 50 UG/ML
50 INJECTION, SOLUTION INTRAMUSCULAR; INTRAVENOUS ONCE
Status: DISCONTINUED | OUTPATIENT
Start: 2023-06-07 | End: 2023-06-08 | Stop reason: HOSPADM

## 2023-06-07 RX ORDER — SODIUM CHLORIDE 0.9 % (FLUSH) 0.9 %
3 SYRINGE (ML) INJECTION EVERY 12 HOURS SCHEDULED
Status: DISCONTINUED | OUTPATIENT
Start: 2023-06-07 | End: 2023-06-08 | Stop reason: HOSPADM

## 2023-06-07 RX ORDER — LIDOCAINE HYDROCHLORIDE 10 MG/ML
1 INJECTION, SOLUTION INFILTRATION; PERINEURAL ONCE
Status: DISCONTINUED | OUTPATIENT
Start: 2023-06-07 | End: 2023-06-08 | Stop reason: HOSPADM

## 2023-06-07 RX ORDER — SODIUM CHLORIDE 0.9 % (FLUSH) 0.9 %
10 SYRINGE (ML) INJECTION AS NEEDED
Status: DISCONTINUED | OUTPATIENT
Start: 2023-06-07 | End: 2023-06-08 | Stop reason: HOSPADM

## 2023-06-07 RX ADMIN — IOPAMIDOL 10 ML: 408 INJECTION, SOLUTION INTRATHECAL at 10:35

## 2023-06-07 RX ADMIN — DEXAMETHASONE SODIUM PHOSPHATE 10 MG: 10 INJECTION, SOLUTION INTRAMUSCULAR; INTRAVENOUS at 10:35

## 2023-06-12 ENCOUNTER — HOSPITAL ENCOUNTER (INPATIENT)
Facility: HOSPITAL | Age: 88
LOS: 3 days | End: 2023-06-16
Attending: EMERGENCY MEDICINE | Admitting: INTERNAL MEDICINE
Payer: MEDICARE

## 2023-06-12 ENCOUNTER — APPOINTMENT (OUTPATIENT)
Dept: MRI IMAGING | Facility: HOSPITAL | Age: 88
End: 2023-06-12
Payer: MEDICARE

## 2023-06-12 ENCOUNTER — APPOINTMENT (OUTPATIENT)
Dept: CT IMAGING | Facility: HOSPITAL | Age: 88
End: 2023-06-12
Payer: MEDICARE

## 2023-06-12 DIAGNOSIS — R53.1 ACUTE RIGHT-SIDED WEAKNESS: Primary | ICD-10-CM

## 2023-06-12 DIAGNOSIS — R47.1 DYSARTHRIA: ICD-10-CM

## 2023-06-12 LAB
ALBUMIN SERPL-MCNC: 4.2 G/DL (ref 3.5–5.2)
ALBUMIN/GLOB SERPL: 2 G/DL
ALP SERPL-CCNC: 50 U/L (ref 39–117)
ALT SERPL W P-5'-P-CCNC: 11 U/L (ref 1–33)
ANION GAP SERPL CALCULATED.3IONS-SCNC: 7 MMOL/L (ref 5–15)
AST SERPL-CCNC: 11 U/L (ref 1–32)
BASOPHILS # BLD AUTO: 0.03 10*3/MM3 (ref 0–0.2)
BASOPHILS NFR BLD AUTO: 0.6 % (ref 0–1.5)
BILIRUB SERPL-MCNC: 0.4 MG/DL (ref 0–1.2)
BILIRUB UR QL STRIP: NEGATIVE
BUN SERPL-MCNC: 12 MG/DL (ref 8–23)
BUN/CREAT SERPL: 18.2 (ref 7–25)
CALCIUM SPEC-SCNC: 9.1 MG/DL (ref 8.2–9.6)
CHLORIDE SERPL-SCNC: 103 MMOL/L (ref 98–107)
CHOLEST SERPL-MCNC: 205 MG/DL (ref 0–200)
CLARITY UR: ABNORMAL
CO2 SERPL-SCNC: 30 MMOL/L (ref 22–29)
COLOR UR: YELLOW
CREAT SERPL-MCNC: 0.66 MG/DL (ref 0.57–1)
DEPRECATED RDW RBC AUTO: 45.9 FL (ref 37–54)
EGFRCR SERPLBLD CKD-EPI 2021: 83.5 ML/MIN/1.73
EOSINOPHIL # BLD AUTO: 0.1 10*3/MM3 (ref 0–0.4)
EOSINOPHIL NFR BLD AUTO: 1.9 % (ref 0.3–6.2)
ERYTHROCYTE [DISTWIDTH] IN BLOOD BY AUTOMATED COUNT: 13.1 % (ref 12.3–15.4)
GLOBULIN UR ELPH-MCNC: 2.1 GM/DL
GLUCOSE SERPL-MCNC: 100 MG/DL (ref 65–99)
GLUCOSE UR STRIP-MCNC: NEGATIVE MG/DL
HCT VFR BLD AUTO: 40.4 % (ref 34–46.6)
HDLC SERPL-MCNC: 56 MG/DL (ref 40–60)
HGB BLD-MCNC: 13.5 G/DL (ref 12–15.9)
HGB UR QL STRIP.AUTO: NEGATIVE
IMM GRANULOCYTES # BLD AUTO: 0.02 10*3/MM3 (ref 0–0.05)
IMM GRANULOCYTES NFR BLD AUTO: 0.4 % (ref 0–0.5)
KETONES UR QL STRIP: NEGATIVE
LDLC SERPL CALC-MCNC: 132 MG/DL (ref 0–100)
LDLC/HDLC SERPL: 2.33 {RATIO}
LEUKOCYTE ESTERASE UR QL STRIP.AUTO: NEGATIVE
LYMPHOCYTES # BLD AUTO: 1.15 10*3/MM3 (ref 0.7–3.1)
LYMPHOCYTES NFR BLD AUTO: 21.7 % (ref 19.6–45.3)
MAGNESIUM SERPL-MCNC: 2.2 MG/DL (ref 1.6–2.4)
MCH RBC QN AUTO: 31.9 PG (ref 26.6–33)
MCHC RBC AUTO-ENTMCNC: 33.4 G/DL (ref 31.5–35.7)
MCV RBC AUTO: 95.5 FL (ref 79–97)
MONOCYTES # BLD AUTO: 0.54 10*3/MM3 (ref 0.1–0.9)
MONOCYTES NFR BLD AUTO: 10.2 % (ref 5–12)
NEUTROPHILS NFR BLD AUTO: 3.45 10*3/MM3 (ref 1.7–7)
NEUTROPHILS NFR BLD AUTO: 65.2 % (ref 42.7–76)
NITRITE UR QL STRIP: NEGATIVE
NRBC BLD AUTO-RTO: 0 /100 WBC (ref 0–0.2)
PH UR STRIP.AUTO: 8 [PH] (ref 5–8)
PHOSPHATE SERPL-MCNC: 3.9 MG/DL (ref 2.5–4.5)
PLATELET # BLD AUTO: 202 10*3/MM3 (ref 140–450)
PMV BLD AUTO: 10.9 FL (ref 6–12)
POTASSIUM SERPL-SCNC: 3.9 MMOL/L (ref 3.5–5.2)
PROT SERPL-MCNC: 6.3 G/DL (ref 6–8.5)
PROT UR QL STRIP: NEGATIVE
QT INTERVAL: 463 MS
RBC # BLD AUTO: 4.23 10*6/MM3 (ref 3.77–5.28)
SODIUM SERPL-SCNC: 140 MMOL/L (ref 136–145)
SP GR UR STRIP: 1.01 (ref 1–1.03)
TRIGL SERPL-MCNC: 94 MG/DL (ref 0–150)
TROPONIN T SERPL HS-MCNC: 18 NG/L
UROBILINOGEN UR QL STRIP: ABNORMAL
VLDLC SERPL-MCNC: 17 MG/DL (ref 5–40)
WBC NRBC COR # BLD: 5.29 10*3/MM3 (ref 3.4–10.8)

## 2023-06-12 PROCEDURE — 70450 CT HEAD/BRAIN W/O DYE: CPT

## 2023-06-12 PROCEDURE — G0378 HOSPITAL OBSERVATION PER HR: HCPCS

## 2023-06-12 PROCEDURE — 93005 ELECTROCARDIOGRAM TRACING: CPT | Performed by: EMERGENCY MEDICINE

## 2023-06-12 PROCEDURE — 80053 COMPREHEN METABOLIC PANEL: CPT | Performed by: EMERGENCY MEDICINE

## 2023-06-12 PROCEDURE — 70553 MRI BRAIN STEM W/O & W/DYE: CPT

## 2023-06-12 PROCEDURE — 85025 COMPLETE CBC W/AUTO DIFF WBC: CPT | Performed by: EMERGENCY MEDICINE

## 2023-06-12 PROCEDURE — 81003 URINALYSIS AUTO W/O SCOPE: CPT | Performed by: EMERGENCY MEDICINE

## 2023-06-12 PROCEDURE — 80061 LIPID PANEL: CPT | Performed by: INTERNAL MEDICINE

## 2023-06-12 PROCEDURE — A9577 INJ MULTIHANCE: HCPCS | Performed by: INTERNAL MEDICINE

## 2023-06-12 PROCEDURE — 92610 EVALUATE SWALLOWING FUNCTION: CPT

## 2023-06-12 PROCEDURE — 0 GADOBENATE DIMEGLUMINE 529 MG/ML SOLUTION: Performed by: INTERNAL MEDICINE

## 2023-06-12 PROCEDURE — 84484 ASSAY OF TROPONIN QUANT: CPT | Performed by: EMERGENCY MEDICINE

## 2023-06-12 PROCEDURE — 70544 MR ANGIOGRAPHY HEAD W/O DYE: CPT

## 2023-06-12 PROCEDURE — 83735 ASSAY OF MAGNESIUM: CPT | Performed by: INTERNAL MEDICINE

## 2023-06-12 PROCEDURE — 84100 ASSAY OF PHOSPHORUS: CPT | Performed by: INTERNAL MEDICINE

## 2023-06-12 RX ORDER — AMOXICILLIN 250 MG
2 CAPSULE ORAL 2 TIMES DAILY
Status: DISCONTINUED | OUTPATIENT
Start: 2023-06-12 | End: 2023-06-16 | Stop reason: HOSPADM

## 2023-06-12 RX ORDER — SODIUM CHLORIDE 0.9 % (FLUSH) 0.9 %
10 SYRINGE (ML) INJECTION EVERY 12 HOURS SCHEDULED
Status: DISCONTINUED | OUTPATIENT
Start: 2023-06-12 | End: 2023-06-16 | Stop reason: HOSPADM

## 2023-06-12 RX ORDER — ACETAMINOPHEN 160 MG/5ML
650 SOLUTION ORAL EVERY 4 HOURS PRN
Status: DISCONTINUED | OUTPATIENT
Start: 2023-06-12 | End: 2023-06-16 | Stop reason: HOSPADM

## 2023-06-12 RX ORDER — NITROGLYCERIN 0.4 MG/1
0.4 TABLET SUBLINGUAL
Status: DISCONTINUED | OUTPATIENT
Start: 2023-06-12 | End: 2023-06-16 | Stop reason: HOSPADM

## 2023-06-12 RX ORDER — BISACODYL 5 MG/1
5 TABLET, DELAYED RELEASE ORAL DAILY PRN
Status: DISCONTINUED | OUTPATIENT
Start: 2023-06-12 | End: 2023-06-16 | Stop reason: HOSPADM

## 2023-06-12 RX ORDER — ACETAMINOPHEN 650 MG/1
650 SUPPOSITORY RECTAL EVERY 4 HOURS PRN
Status: DISCONTINUED | OUTPATIENT
Start: 2023-06-12 | End: 2023-06-16 | Stop reason: HOSPADM

## 2023-06-12 RX ORDER — ONDANSETRON 2 MG/ML
4 INJECTION INTRAMUSCULAR; INTRAVENOUS EVERY 6 HOURS PRN
Status: DISCONTINUED | OUTPATIENT
Start: 2023-06-12 | End: 2023-06-16 | Stop reason: HOSPADM

## 2023-06-12 RX ORDER — BISACODYL 10 MG
10 SUPPOSITORY, RECTAL RECTAL DAILY PRN
Status: DISCONTINUED | OUTPATIENT
Start: 2023-06-12 | End: 2023-06-16 | Stop reason: HOSPADM

## 2023-06-12 RX ORDER — ACETAMINOPHEN 325 MG/1
650 TABLET ORAL EVERY 4 HOURS PRN
Status: DISCONTINUED | OUTPATIENT
Start: 2023-06-12 | End: 2023-06-16 | Stop reason: HOSPADM

## 2023-06-12 RX ORDER — ALUMINA, MAGNESIA, AND SIMETHICONE 2400; 2400; 240 MG/30ML; MG/30ML; MG/30ML
15 SUSPENSION ORAL EVERY 6 HOURS PRN
Status: DISCONTINUED | OUTPATIENT
Start: 2023-06-12 | End: 2023-06-16 | Stop reason: HOSPADM

## 2023-06-12 RX ORDER — LIDOCAINE 50 MG/G
1 PATCH TOPICAL 2 TIMES DAILY PRN
Status: DISCONTINUED | OUTPATIENT
Start: 2023-06-12 | End: 2023-06-16 | Stop reason: HOSPADM

## 2023-06-12 RX ORDER — OMEGA-3S/DHA/EPA/FISH OIL/D3 300MG-1000
1000 CAPSULE ORAL DAILY
Status: DISCONTINUED | OUTPATIENT
Start: 2023-06-12 | End: 2023-06-16 | Stop reason: HOSPADM

## 2023-06-12 RX ORDER — CARBOXYMETHYLCELLULOSE SODIUM 10 MG/ML
2 GEL OPHTHALMIC 3 TIMES DAILY PRN
Status: DISCONTINUED | OUTPATIENT
Start: 2023-06-12 | End: 2023-06-16 | Stop reason: HOSPADM

## 2023-06-12 RX ORDER — LOSARTAN POTASSIUM 50 MG/1
50 TABLET ORAL NIGHTLY
Status: DISCONTINUED | OUTPATIENT
Start: 2023-06-12 | End: 2023-06-13

## 2023-06-12 RX ORDER — HYDROCODONE BITARTRATE AND ACETAMINOPHEN 5; 325 MG/1; MG/1
1 TABLET ORAL EVERY 4 HOURS PRN
Status: DISCONTINUED | OUTPATIENT
Start: 2023-06-12 | End: 2023-06-16 | Stop reason: HOSPADM

## 2023-06-12 RX ORDER — PANTOPRAZOLE SODIUM 40 MG/1
40 TABLET, DELAYED RELEASE ORAL
Status: DISCONTINUED | OUTPATIENT
Start: 2023-06-13 | End: 2023-06-16 | Stop reason: HOSPADM

## 2023-06-12 RX ORDER — GABAPENTIN 300 MG/1
300 CAPSULE ORAL NIGHTLY
Status: DISCONTINUED | OUTPATIENT
Start: 2023-06-12 | End: 2023-06-16 | Stop reason: HOSPADM

## 2023-06-12 RX ORDER — SODIUM CHLORIDE 0.9 % (FLUSH) 0.9 %
10 SYRINGE (ML) INJECTION AS NEEDED
Status: DISCONTINUED | OUTPATIENT
Start: 2023-06-12 | End: 2023-06-16 | Stop reason: HOSPADM

## 2023-06-12 RX ORDER — SODIUM CHLORIDE 9 MG/ML
40 INJECTION, SOLUTION INTRAVENOUS AS NEEDED
Status: DISCONTINUED | OUTPATIENT
Start: 2023-06-12 | End: 2023-06-16 | Stop reason: HOSPADM

## 2023-06-12 RX ORDER — GLIMEPIRIDE 2 MG/1
1 TABLET ORAL DAILY
Status: DISCONTINUED | OUTPATIENT
Start: 2023-06-12 | End: 2023-06-16 | Stop reason: HOSPADM

## 2023-06-12 RX ORDER — ONDANSETRON 4 MG/1
4 TABLET, FILM COATED ORAL EVERY 6 HOURS PRN
Status: DISCONTINUED | OUTPATIENT
Start: 2023-06-12 | End: 2023-06-16 | Stop reason: HOSPADM

## 2023-06-12 RX ORDER — POLYETHYLENE GLYCOL 3350 17 G/17G
17 POWDER, FOR SOLUTION ORAL DAILY PRN
Status: DISCONTINUED | OUTPATIENT
Start: 2023-06-12 | End: 2023-06-16 | Stop reason: HOSPADM

## 2023-06-12 RX ORDER — PHENOL 1.4 %
600 AEROSOL, SPRAY (ML) MUCOUS MEMBRANE DAILY
Status: ON HOLD | COMMUNITY

## 2023-06-12 RX ORDER — HYDRALAZINE HYDROCHLORIDE 20 MG/ML
20 INJECTION INTRAMUSCULAR; INTRAVENOUS EVERY 6 HOURS PRN
Status: DISCONTINUED | OUTPATIENT
Start: 2023-06-12 | End: 2023-06-16 | Stop reason: HOSPADM

## 2023-06-12 RX ORDER — ASPIRIN 325 MG
325 TABLET ORAL ONCE
Status: DISCONTINUED | OUTPATIENT
Start: 2023-06-12 | End: 2023-06-12

## 2023-06-12 RX ORDER — ASPIRIN 300 MG/1
300 SUPPOSITORY RECTAL ONCE
Status: COMPLETED | OUTPATIENT
Start: 2023-06-12 | End: 2023-06-12

## 2023-06-12 RX ADMIN — GADOBENATE DIMEGLUMINE 20 ML: 529 INJECTION, SOLUTION INTRAVENOUS at 21:56

## 2023-06-12 RX ADMIN — ASPIRIN 300 MG: 300 SUPPOSITORY RECTAL at 12:25

## 2023-06-12 RX ADMIN — GABAPENTIN 300 MG: 300 CAPSULE ORAL at 20:08

## 2023-06-12 RX ADMIN — DOCUSATE SODIUM 50MG AND SENNOSIDES 8.6MG 2 TABLET: 8.6; 5 TABLET, FILM COATED ORAL at 20:08

## 2023-06-12 RX ADMIN — LOSARTAN POTASSIUM 50 MG: 50 TABLET, FILM COATED ORAL at 20:08

## 2023-06-12 RX ADMIN — Medication 10 ML: at 20:08

## 2023-06-12 RX ADMIN — CALCIUM CARBONATE-VITAMIN D TAB 500 MG-200 UNIT 1 TABLET: 500-200 TAB at 20:08

## 2023-06-13 ENCOUNTER — APPOINTMENT (OUTPATIENT)
Dept: CARDIOLOGY | Facility: HOSPITAL | Age: 88
End: 2023-06-13
Payer: MEDICARE

## 2023-06-13 LAB
ALBUMIN SERPL-MCNC: 3.6 G/DL (ref 3.5–5.2)
ALBUMIN/GLOB SERPL: 1.8 G/DL
ALP SERPL-CCNC: 45 U/L (ref 39–117)
ALT SERPL W P-5'-P-CCNC: 7 U/L (ref 1–33)
ANION GAP SERPL CALCULATED.3IONS-SCNC: 9.1 MMOL/L (ref 5–15)
AORTIC ARCH: 2.4 CM
AORTIC DIMENSIONLESS INDEX: 0.7 (DI)
AST SERPL-CCNC: 12 U/L (ref 1–32)
BH CV ECHO MEAS - ACS: 1.42 CM
BH CV ECHO MEAS - AO MAX PG: 14.9 MMHG
BH CV ECHO MEAS - AO MEAN PG: 8 MMHG
BH CV ECHO MEAS - AO ROOT DIAM: 3.2 CM
BH CV ECHO MEAS - AO V2 MAX: 193 CM/SEC
BH CV ECHO MEAS - AO V2 VTI: 38.8 CM
BH CV ECHO MEAS - AVA(I,D): 2.35 CM2
BH CV ECHO MEAS - EDV(CUBED): 64 ML
BH CV ECHO MEAS - EDV(MOD-SP2): 33 ML
BH CV ECHO MEAS - EDV(MOD-SP4): 41 ML
BH CV ECHO MEAS - EF(MOD-BP): 57.6 %
BH CV ECHO MEAS - EF(MOD-SP2): 54.5 %
BH CV ECHO MEAS - EF(MOD-SP4): 56.1 %
BH CV ECHO MEAS - ESV(CUBED): 27.3 ML
BH CV ECHO MEAS - ESV(MOD-SP2): 15 ML
BH CV ECHO MEAS - ESV(MOD-SP4): 18 ML
BH CV ECHO MEAS - FS: 24.7 %
BH CV ECHO MEAS - IVS/LVPW: 1 CM
BH CV ECHO MEAS - IVSD: 0.9 CM
BH CV ECHO MEAS - LAT PEAK E' VEL: 6.2 CM/SEC
BH CV ECHO MEAS - LV DIASTOLIC VOL/BSA (35-75): 22 CM2
BH CV ECHO MEAS - LV MASS(C)D: 109.7 GRAMS
BH CV ECHO MEAS - LV MAX PG: 7.7 MMHG
BH CV ECHO MEAS - LV MEAN PG: 4.5 MMHG
BH CV ECHO MEAS - LV SYSTOLIC VOL/BSA (12-30): 9.7 CM2
BH CV ECHO MEAS - LV V1 MAX: 138.4 CM/SEC
BH CV ECHO MEAS - LV V1 VTI: 28.9 CM
BH CV ECHO MEAS - LVIDD: 4 CM
BH CV ECHO MEAS - LVIDS: 3 CM
BH CV ECHO MEAS - LVOT AREA: 3.2 CM2
BH CV ECHO MEAS - LVOT DIAM: 2 CM
BH CV ECHO MEAS - LVPWD: 0.9 CM
BH CV ECHO MEAS - MED PEAK E' VEL: 4.9 CM/SEC
BH CV ECHO MEAS - MV A DUR: 0.14 SEC
BH CV ECHO MEAS - MV A MAX VEL: 137.3 CM/SEC
BH CV ECHO MEAS - MV DEC SLOPE: 313.6 CM/SEC2
BH CV ECHO MEAS - MV DEC TIME: 0.4 MSEC
BH CV ECHO MEAS - MV E MAX VEL: 105 CM/SEC
BH CV ECHO MEAS - MV E/A: 0.76
BH CV ECHO MEAS - MV MAX PG: 8.2 MMHG
BH CV ECHO MEAS - MV MEAN PG: 3.5 MMHG
BH CV ECHO MEAS - MV P1/2T: 114.1 MSEC
BH CV ECHO MEAS - MV V2 VTI: 34.7 CM
BH CV ECHO MEAS - MVA(P1/2T): 1.93 CM2
BH CV ECHO MEAS - MVA(VTI): 2.6 CM2
BH CV ECHO MEAS - PA ACC TIME: 0.1 SEC
BH CV ECHO MEAS - PA V2 MAX: 89.2 CM/SEC
BH CV ECHO MEAS - PULM A REVS DUR: 0.13 SEC
BH CV ECHO MEAS - PULM A REVS VEL: 34.6 CM/SEC
BH CV ECHO MEAS - PULM DIAS VEL: 35.9 CM/SEC
BH CV ECHO MEAS - PULM S/D: 1.58
BH CV ECHO MEAS - PULM SYS VEL: 56.7 CM/SEC
BH CV ECHO MEAS - RAP SYSTOLE: 3 MMHG
BH CV ECHO MEAS - RV MAX PG: 2.9 MMHG
BH CV ECHO MEAS - RV V1 MAX: 85.3 CM/SEC
BH CV ECHO MEAS - RV V1 VTI: 15.3 CM
BH CV ECHO MEAS - RVSP: 27 MMHG
BH CV ECHO MEAS - SI(MOD-SP2): 9.7 ML/M2
BH CV ECHO MEAS - SI(MOD-SP4): 12.3 ML/M2
BH CV ECHO MEAS - SV(LVOT): 91.2 ML
BH CV ECHO MEAS - SV(MOD-SP2): 18 ML
BH CV ECHO MEAS - SV(MOD-SP4): 23 ML
BH CV ECHO MEAS - TAPSE (>1.6): 2.5 CM
BH CV ECHO MEAS - TR MAX PG: 23.5 MMHG
BH CV ECHO MEAS - TR MAX VEL: 242.5 CM/SEC
BH CV ECHO MEASUREMENTS AVERAGE E/E' RATIO: 18.92
BH CV ECHO SHUNT ASSESSMENT PERFORMED (HIDDEN SCRIPTING): 1
BH CV XLRA - RV BASE: 2.6 CM
BH CV XLRA - RV LENGTH: 6.3 CM
BH CV XLRA - RV MID: 2.31 CM
BH CV XLRA - TDI S': 13.8 CM/SEC
BH CV XLRA MEAS LEFT DIST CCA EDV: 11.1 CM/SEC
BH CV XLRA MEAS LEFT DIST CCA PSV: 72.7 CM/SEC
BH CV XLRA MEAS LEFT DIST ICA EDV: -22.9 CM/SEC
BH CV XLRA MEAS LEFT DIST ICA PSV: -122 CM/SEC
BH CV XLRA MEAS LEFT ICA/CCA RATIO: 1.68
BH CV XLRA MEAS LEFT MID ICA EDV: -20.4 CM/SEC
BH CV XLRA MEAS LEFT MID ICA PSV: -91.5 CM/SEC
BH CV XLRA MEAS LEFT PROX CCA EDV: -12.3 CM/SEC
BH CV XLRA MEAS LEFT PROX CCA PSV: -101 CM/SEC
BH CV XLRA MEAS LEFT PROX ECA PSV: -152 CM/SEC
BH CV XLRA MEAS LEFT PROX ICA EDV: 9.4 CM/SEC
BH CV XLRA MEAS LEFT PROX ICA PSV: 59.7 CM/SEC
BH CV XLRA MEAS LEFT PROX SCLA PSV: 159 CM/SEC
BH CV XLRA MEAS LEFT VERTEBRAL A EDV: -9.2 CM/SEC
BH CV XLRA MEAS LEFT VERTEBRAL A PSV: -83.1 CM/SEC
BH CV XLRA MEAS RIGHT DIST CCA EDV: -12.3 CM/SEC
BH CV XLRA MEAS RIGHT DIST CCA PSV: -73.9 CM/SEC
BH CV XLRA MEAS RIGHT DIST ICA EDV: -24.4 CM/SEC
BH CV XLRA MEAS RIGHT DIST ICA PSV: -107 CM/SEC
BH CV XLRA MEAS RIGHT ICA/CCA RATIO: 1.45
BH CV XLRA MEAS RIGHT MID ICA EDV: -15.3 CM/SEC
BH CV XLRA MEAS RIGHT MID ICA PSV: -73.4 CM/SEC
BH CV XLRA MEAS RIGHT PROX CCA EDV: 9.4 CM/SEC
BH CV XLRA MEAS RIGHT PROX CCA PSV: 95 CM/SEC
BH CV XLRA MEAS RIGHT PROX ECA PSV: -76.2 CM/SEC
BH CV XLRA MEAS RIGHT PROX ICA EDV: -10.2 CM/SEC
BH CV XLRA MEAS RIGHT PROX ICA PSV: -47.9 CM/SEC
BH CV XLRA MEAS RIGHT PROX SCLA PSV: 119 CM/SEC
BH CV XLRA MEAS RIGHT VERTEBRAL A EDV: 10.6 CM/SEC
BH CV XLRA MEAS RIGHT VERTEBRAL A PSV: 52.8 CM/SEC
BILIRUB SERPL-MCNC: 0.5 MG/DL (ref 0–1.2)
BUN SERPL-MCNC: 11 MG/DL (ref 8–23)
BUN/CREAT SERPL: 20.8 (ref 7–25)
CA-I BLD-MCNC: 5 MG/DL (ref 4.6–5.4)
CA-I SERPL ISE-MCNC: 1.24 MMOL/L (ref 1.15–1.35)
CALCIUM SPEC-SCNC: 9 MG/DL (ref 8.2–9.6)
CHLORIDE SERPL-SCNC: 102 MMOL/L (ref 98–107)
CO2 SERPL-SCNC: 24.9 MMOL/L (ref 22–29)
CREAT SERPL-MCNC: 0.53 MG/DL (ref 0.57–1)
DEPRECATED RDW RBC AUTO: 45.4 FL (ref 37–54)
EGFRCR SERPLBLD CKD-EPI 2021: 88 ML/MIN/1.73
ERYTHROCYTE [DISTWIDTH] IN BLOOD BY AUTOMATED COUNT: 13.1 % (ref 12.3–15.4)
GLOBULIN UR ELPH-MCNC: 2 GM/DL
GLUCOSE SERPL-MCNC: 96 MG/DL (ref 65–99)
HCT VFR BLD AUTO: 38.6 % (ref 34–46.6)
HGB BLD-MCNC: 12.7 G/DL (ref 12–15.9)
LEFT ARM BP: NORMAL MMHG
LEFT ATRIUM VOLUME INDEX: 19.5 ML/M2
MCH RBC QN AUTO: 31.1 PG (ref 26.6–33)
MCHC RBC AUTO-ENTMCNC: 32.9 G/DL (ref 31.5–35.7)
MCV RBC AUTO: 94.6 FL (ref 79–97)
PLATELET # BLD AUTO: 198 10*3/MM3 (ref 140–450)
PMV BLD AUTO: 11 FL (ref 6–12)
POTASSIUM SERPL-SCNC: 4 MMOL/L (ref 3.5–5.2)
PROT SERPL-MCNC: 5.6 G/DL (ref 6–8.5)
RBC # BLD AUTO: 4.08 10*6/MM3 (ref 3.77–5.28)
RIGHT ARM BP: NORMAL MMHG
SODIUM SERPL-SCNC: 136 MMOL/L (ref 136–145)
WBC NRBC COR # BLD: 8.53 10*3/MM3 (ref 3.4–10.8)

## 2023-06-13 PROCEDURE — 85027 COMPLETE CBC AUTOMATED: CPT | Performed by: INTERNAL MEDICINE

## 2023-06-13 PROCEDURE — 97110 THERAPEUTIC EXERCISES: CPT

## 2023-06-13 PROCEDURE — 97166 OT EVAL MOD COMPLEX 45 MIN: CPT

## 2023-06-13 PROCEDURE — 80053 COMPREHEN METABOLIC PANEL: CPT | Performed by: INTERNAL MEDICINE

## 2023-06-13 PROCEDURE — 82330 ASSAY OF CALCIUM: CPT | Performed by: INTERNAL MEDICINE

## 2023-06-13 PROCEDURE — 93880 EXTRACRANIAL BILAT STUDY: CPT

## 2023-06-13 PROCEDURE — 97535 SELF CARE MNGMENT TRAINING: CPT

## 2023-06-13 PROCEDURE — 97162 PT EVAL MOD COMPLEX 30 MIN: CPT

## 2023-06-13 PROCEDURE — 99223 1ST HOSP IP/OBS HIGH 75: CPT | Performed by: PSYCHIATRY & NEUROLOGY

## 2023-06-13 PROCEDURE — 93306 TTE W/DOPPLER COMPLETE: CPT

## 2023-06-13 RX ORDER — ASPIRIN 81 MG/1
81 TABLET ORAL DAILY
Status: DISCONTINUED | OUTPATIENT
Start: 2023-06-13 | End: 2023-06-16 | Stop reason: HOSPADM

## 2023-06-13 RX ORDER — CLOPIDOGREL BISULFATE 75 MG/1
75 TABLET ORAL DAILY
Status: DISCONTINUED | OUTPATIENT
Start: 2023-06-14 | End: 2023-06-16 | Stop reason: HOSPADM

## 2023-06-13 RX ORDER — CLOPIDOGREL BISULFATE 75 MG/1
300 TABLET ORAL ONCE
Status: COMPLETED | OUTPATIENT
Start: 2023-06-13 | End: 2023-06-13

## 2023-06-13 RX ORDER — ATORVASTATIN CALCIUM 20 MG/1
40 TABLET, FILM COATED ORAL NIGHTLY
Status: DISCONTINUED | OUTPATIENT
Start: 2023-06-13 | End: 2023-06-16 | Stop reason: HOSPADM

## 2023-06-13 RX ORDER — ATORVASTATIN CALCIUM 80 MG/1
80 TABLET, FILM COATED ORAL NIGHTLY
Status: DISCONTINUED | OUTPATIENT
Start: 2023-06-13 | End: 2023-06-13

## 2023-06-13 RX ADMIN — DOCUSATE SODIUM 50MG AND SENNOSIDES 8.6MG 1 TABLET: 8.6; 5 TABLET, FILM COATED ORAL at 20:57

## 2023-06-13 RX ADMIN — Medication 10 ML: at 20:59

## 2023-06-13 RX ADMIN — DOCUSATE SODIUM 50MG AND SENNOSIDES 8.6MG 2 TABLET: 8.6; 5 TABLET, FILM COATED ORAL at 08:44

## 2023-06-13 RX ADMIN — CALCIUM CARBONATE-VITAMIN D TAB 500 MG-200 UNIT 1 TABLET: 500-200 TAB at 08:41

## 2023-06-13 RX ADMIN — TIMOLOL MALEATE 1 DROP: 2.5 SOLUTION OPHTHALMIC at 08:42

## 2023-06-13 RX ADMIN — GABAPENTIN 300 MG: 300 CAPSULE ORAL at 20:57

## 2023-06-13 RX ADMIN — PANTOPRAZOLE SODIUM 40 MG: 40 TABLET, DELAYED RELEASE ORAL at 07:04

## 2023-06-13 RX ADMIN — CLOPIDOGREL BISULFATE 300 MG: 75 TABLET, FILM COATED ORAL at 13:31

## 2023-06-13 RX ADMIN — ASPIRIN 81 MG: 81 TABLET, COATED ORAL at 09:42

## 2023-06-13 RX ADMIN — CHOLECALCIFEROL TAB 10 MCG (400 UNIT) 1000 UNITS: 10 TAB at 08:43

## 2023-06-13 RX ADMIN — ATORVASTATIN CALCIUM 40 MG: 20 TABLET, FILM COATED ORAL at 20:57

## 2023-06-14 LAB
ALBUMIN SERPL-MCNC: 3.5 G/DL (ref 3.5–5.2)
ALBUMIN/GLOB SERPL: 1.7 G/DL
ALP SERPL-CCNC: 46 U/L (ref 39–117)
ALT SERPL W P-5'-P-CCNC: 10 U/L (ref 1–33)
ANION GAP SERPL CALCULATED.3IONS-SCNC: 8.7 MMOL/L (ref 5–15)
AST SERPL-CCNC: 9 U/L (ref 1–32)
BILIRUB SERPL-MCNC: 0.3 MG/DL (ref 0–1.2)
BUN SERPL-MCNC: 9 MG/DL (ref 8–23)
BUN/CREAT SERPL: 17 (ref 7–25)
CALCIUM SPEC-SCNC: 9 MG/DL (ref 8.2–9.6)
CHLORIDE SERPL-SCNC: 101 MMOL/L (ref 98–107)
CHOLEST SERPL-MCNC: 170 MG/DL (ref 0–200)
CO2 SERPL-SCNC: 25.3 MMOL/L (ref 22–29)
CREAT SERPL-MCNC: 0.53 MG/DL (ref 0.57–1)
DEPRECATED RDW RBC AUTO: 44.4 FL (ref 37–54)
EGFRCR SERPLBLD CKD-EPI 2021: 88 ML/MIN/1.73
ERYTHROCYTE [DISTWIDTH] IN BLOOD BY AUTOMATED COUNT: 12.8 % (ref 12.3–15.4)
GLOBULIN UR ELPH-MCNC: 2.1 GM/DL
GLUCOSE SERPL-MCNC: 107 MG/DL (ref 65–99)
HBA1C MFR BLD: 5.5 % (ref 4.8–5.6)
HCT VFR BLD AUTO: 36 % (ref 34–46.6)
HDLC SERPL-MCNC: 53 MG/DL (ref 40–60)
HGB BLD-MCNC: 12.1 G/DL (ref 12–15.9)
LDLC SERPL CALC-MCNC: 105 MG/DL (ref 0–100)
LDLC/HDLC SERPL: 1.98 {RATIO}
MCH RBC QN AUTO: 31.8 PG (ref 26.6–33)
MCHC RBC AUTO-ENTMCNC: 33.6 G/DL (ref 31.5–35.7)
MCV RBC AUTO: 94.5 FL (ref 79–97)
PLATELET # BLD AUTO: 194 10*3/MM3 (ref 140–450)
PMV BLD AUTO: 11.3 FL (ref 6–12)
POTASSIUM SERPL-SCNC: 4 MMOL/L (ref 3.5–5.2)
PROT SERPL-MCNC: 5.6 G/DL (ref 6–8.5)
RBC # BLD AUTO: 3.81 10*6/MM3 (ref 3.77–5.28)
SODIUM SERPL-SCNC: 135 MMOL/L (ref 136–145)
TRIGL SERPL-MCNC: 60 MG/DL (ref 0–150)
TSH SERPL DL<=0.05 MIU/L-ACNC: 2.18 UIU/ML (ref 0.27–4.2)
VLDLC SERPL-MCNC: 12 MG/DL (ref 5–40)
WBC NRBC COR # BLD: 6.8 10*3/MM3 (ref 3.4–10.8)

## 2023-06-14 PROCEDURE — 97110 THERAPEUTIC EXERCISES: CPT

## 2023-06-14 PROCEDURE — 85027 COMPLETE CBC AUTOMATED: CPT | Performed by: INTERNAL MEDICINE

## 2023-06-14 PROCEDURE — 80061 LIPID PANEL: CPT | Performed by: PSYCHIATRY & NEUROLOGY

## 2023-06-14 PROCEDURE — 99233 SBSQ HOSP IP/OBS HIGH 50: CPT | Performed by: NURSE PRACTITIONER

## 2023-06-14 PROCEDURE — 83036 HEMOGLOBIN GLYCOSYLATED A1C: CPT | Performed by: NURSE PRACTITIONER

## 2023-06-14 PROCEDURE — 84443 ASSAY THYROID STIM HORMONE: CPT | Performed by: NURSE PRACTITIONER

## 2023-06-14 PROCEDURE — 97110 THERAPEUTIC EXERCISES: CPT | Performed by: OCCUPATIONAL THERAPIST

## 2023-06-14 PROCEDURE — 80053 COMPREHEN METABOLIC PANEL: CPT | Performed by: INTERNAL MEDICINE

## 2023-06-14 RX ADMIN — ASPIRIN 81 MG: 81 TABLET, COATED ORAL at 08:04

## 2023-06-14 RX ADMIN — CALCIUM CARBONATE-VITAMIN D TAB 500 MG-200 UNIT 1 TABLET: 500-200 TAB at 20:04

## 2023-06-14 RX ADMIN — PANTOPRAZOLE SODIUM 40 MG: 40 TABLET, DELAYED RELEASE ORAL at 08:04

## 2023-06-14 RX ADMIN — Medication 10 ML: at 08:05

## 2023-06-14 RX ADMIN — CLOPIDOGREL BISULFATE 75 MG: 75 TABLET, FILM COATED ORAL at 08:05

## 2023-06-14 RX ADMIN — Medication 10 ML: at 20:05

## 2023-06-14 RX ADMIN — ATORVASTATIN CALCIUM 40 MG: 20 TABLET, FILM COATED ORAL at 20:04

## 2023-06-14 RX ADMIN — CHOLECALCIFEROL TAB 10 MCG (400 UNIT) 1000 UNITS: 10 TAB at 08:04

## 2023-06-14 RX ADMIN — CALCIUM CARBONATE-VITAMIN D TAB 500 MG-200 UNIT 1 TABLET: 500-200 TAB at 08:04

## 2023-06-14 RX ADMIN — GABAPENTIN 300 MG: 300 CAPSULE ORAL at 20:05

## 2023-06-14 RX ADMIN — TIMOLOL MALEATE 1 DROP: 2.5 SOLUTION OPHTHALMIC at 08:05

## 2023-06-15 LAB — VIT B12 BLD-MCNC: 308 PG/ML (ref 211–946)

## 2023-06-15 PROCEDURE — 97110 THERAPEUTIC EXERCISES: CPT

## 2023-06-15 PROCEDURE — 82607 VITAMIN B-12: CPT | Performed by: NURSE PRACTITIONER

## 2023-06-15 RX ADMIN — PANTOPRAZOLE SODIUM 40 MG: 40 TABLET, DELAYED RELEASE ORAL at 06:31

## 2023-06-15 RX ADMIN — Medication 10 ML: at 20:22

## 2023-06-15 RX ADMIN — TIMOLOL MALEATE 1 DROP: 2.5 SOLUTION OPHTHALMIC at 08:42

## 2023-06-15 RX ADMIN — ASPIRIN 81 MG: 81 TABLET, COATED ORAL at 08:42

## 2023-06-15 RX ADMIN — Medication 10 ML: at 08:43

## 2023-06-15 RX ADMIN — DOCUSATE SODIUM 50MG AND SENNOSIDES 8.6MG 2 TABLET: 8.6; 5 TABLET, FILM COATED ORAL at 20:21

## 2023-06-15 RX ADMIN — ATORVASTATIN CALCIUM 40 MG: 20 TABLET, FILM COATED ORAL at 20:21

## 2023-06-15 RX ADMIN — CLOPIDOGREL BISULFATE 75 MG: 75 TABLET, FILM COATED ORAL at 08:42

## 2023-06-15 RX ADMIN — DOCUSATE SODIUM 50MG AND SENNOSIDES 8.6MG 2 TABLET: 8.6; 5 TABLET, FILM COATED ORAL at 08:42

## 2023-06-15 RX ADMIN — CALCIUM CARBONATE-VITAMIN D TAB 500 MG-200 UNIT 1 TABLET: 500-200 TAB at 08:42

## 2023-06-15 RX ADMIN — CALCIUM CARBONATE-VITAMIN D TAB 500 MG-200 UNIT 1 TABLET: 500-200 TAB at 20:21

## 2023-06-15 RX ADMIN — CHOLECALCIFEROL TAB 10 MCG (400 UNIT) 1000 UNITS: 10 TAB at 08:42

## 2023-06-15 RX ADMIN — GABAPENTIN 300 MG: 300 CAPSULE ORAL at 20:21

## 2023-06-16 ENCOUNTER — HOSPITAL ENCOUNTER (INPATIENT)
Facility: HOSPITAL | Age: 88
End: 2023-06-16
Attending: PHYSICAL MEDICINE & REHABILITATION | Admitting: PHYSICAL MEDICINE & REHABILITATION

## 2023-06-16 VITALS
HEIGHT: 67 IN | BODY MASS INDEX: 25.9 KG/M2 | WEIGHT: 165 LBS | SYSTOLIC BLOOD PRESSURE: 142 MMHG | HEART RATE: 71 BPM | RESPIRATION RATE: 16 BRPM | TEMPERATURE: 99.5 F | OXYGEN SATURATION: 94 % | DIASTOLIC BLOOD PRESSURE: 57 MMHG

## 2023-06-16 DIAGNOSIS — R26.89 IMPAIRED GAIT AND MOBILITY: Primary | ICD-10-CM

## 2023-06-16 PROBLEM — I63.9 STROKE: Status: ACTIVE | Noted: 2023-06-16

## 2023-06-16 PROCEDURE — 97535 SELF CARE MNGMENT TRAINING: CPT

## 2023-06-16 RX ORDER — POLYETHYLENE GLYCOL 3350 17 G/17G
17 POWDER, FOR SOLUTION ORAL DAILY PRN
Status: ACTIVE | OUTPATIENT
Start: 2023-06-16

## 2023-06-16 RX ORDER — HYDROCODONE BITARTRATE AND ACETAMINOPHEN 5; 325 MG/1; MG/1
1 TABLET ORAL EVERY 4 HOURS PRN
Status: ACTIVE | OUTPATIENT
Start: 2023-06-16 | End: 2023-06-19

## 2023-06-16 RX ORDER — POLYETHYLENE GLYCOL 3350 17 G/17G
17 POWDER, FOR SOLUTION ORAL DAILY PRN
Status: CANCELLED | OUTPATIENT
Start: 2023-06-16

## 2023-06-16 RX ORDER — ACETAMINOPHEN 160 MG/5ML
650 SOLUTION ORAL EVERY 4 HOURS PRN
Status: DISCONTINUED | OUTPATIENT
Start: 2023-06-16 | End: 2023-06-21

## 2023-06-16 RX ORDER — ATORVASTATIN CALCIUM 20 MG/1
40 TABLET, FILM COATED ORAL NIGHTLY
Status: DISPENSED | OUTPATIENT
Start: 2023-06-16

## 2023-06-16 RX ORDER — AMOXICILLIN 250 MG
2 CAPSULE ORAL 2 TIMES DAILY
Status: CANCELLED | OUTPATIENT
Start: 2023-06-16

## 2023-06-16 RX ORDER — CARBOXYMETHYLCELLULOSE SODIUM 10 MG/ML
2 GEL OPHTHALMIC 3 TIMES DAILY PRN
Status: CANCELLED | OUTPATIENT
Start: 2023-06-16

## 2023-06-16 RX ORDER — ASPIRIN 81 MG/1
81 TABLET ORAL DAILY
Status: CANCELLED | OUTPATIENT
Start: 2023-06-16 | End: 2023-07-04

## 2023-06-16 RX ORDER — LIDOCAINE 50 MG/G
1 PATCH TOPICAL 2 TIMES DAILY PRN
Status: CANCELLED | OUTPATIENT
Start: 2023-06-16

## 2023-06-16 RX ORDER — GABAPENTIN 300 MG/1
300 CAPSULE ORAL NIGHTLY
Status: CANCELLED | OUTPATIENT
Start: 2023-06-16

## 2023-06-16 RX ORDER — PANTOPRAZOLE SODIUM 40 MG/1
40 TABLET, DELAYED RELEASE ORAL
Status: DISPENSED | OUTPATIENT
Start: 2023-06-17

## 2023-06-16 RX ORDER — ASPIRIN 81 MG/1
81 TABLET ORAL DAILY
Status: DISPENSED | OUTPATIENT
Start: 2023-06-17 | End: 2023-07-05

## 2023-06-16 RX ORDER — LIDOCAINE 50 MG/G
1 PATCH TOPICAL 2 TIMES DAILY PRN
Status: ACTIVE | OUTPATIENT
Start: 2023-06-16

## 2023-06-16 RX ORDER — ACETAMINOPHEN 325 MG/1
650 TABLET ORAL EVERY 4 HOURS PRN
Status: ACTIVE | OUTPATIENT
Start: 2023-06-16

## 2023-06-16 RX ORDER — OMEGA-3S/DHA/EPA/FISH OIL/D3 300MG-1000
1000 CAPSULE ORAL DAILY
Status: CANCELLED | OUTPATIENT
Start: 2023-06-16

## 2023-06-16 RX ORDER — GLIMEPIRIDE 2 MG/1
1 TABLET ORAL DAILY
Status: DISPENSED | OUTPATIENT
Start: 2023-06-17

## 2023-06-16 RX ORDER — ATORVASTATIN CALCIUM 20 MG/1
40 TABLET, FILM COATED ORAL NIGHTLY
Status: CANCELLED | OUTPATIENT
Start: 2023-06-16

## 2023-06-16 RX ORDER — BISACODYL 5 MG/1
5 TABLET, DELAYED RELEASE ORAL DAILY PRN
Status: ACTIVE | OUTPATIENT
Start: 2023-06-16

## 2023-06-16 RX ORDER — GABAPENTIN 300 MG/1
300 CAPSULE ORAL NIGHTLY
Status: DISPENSED | OUTPATIENT
Start: 2023-06-16

## 2023-06-16 RX ORDER — ONDANSETRON 2 MG/ML
4 INJECTION INTRAMUSCULAR; INTRAVENOUS EVERY 6 HOURS PRN
Status: ACTIVE | OUTPATIENT
Start: 2023-06-16

## 2023-06-16 RX ORDER — CARBOXYMETHYLCELLULOSE SODIUM 10 MG/ML
2 GEL OPHTHALMIC 3 TIMES DAILY PRN
Status: ACTIVE | OUTPATIENT
Start: 2023-06-16

## 2023-06-16 RX ORDER — ACETAMINOPHEN 160 MG/5ML
650 SOLUTION ORAL EVERY 4 HOURS PRN
Status: CANCELLED | OUTPATIENT
Start: 2023-06-16

## 2023-06-16 RX ORDER — NITROGLYCERIN 0.4 MG/1
0.4 TABLET SUBLINGUAL
Status: ACTIVE | OUTPATIENT
Start: 2023-06-16

## 2023-06-16 RX ORDER — ALUMINA, MAGNESIA, AND SIMETHICONE 2400; 2400; 240 MG/30ML; MG/30ML; MG/30ML
15 SUSPENSION ORAL EVERY 6 HOURS PRN
Status: CANCELLED | OUTPATIENT
Start: 2023-06-16

## 2023-06-16 RX ORDER — ONDANSETRON 4 MG/1
4 TABLET, FILM COATED ORAL EVERY 6 HOURS PRN
Status: CANCELLED | OUTPATIENT
Start: 2023-06-16

## 2023-06-16 RX ORDER — ACETAMINOPHEN 650 MG/1
650 SUPPOSITORY RECTAL EVERY 4 HOURS PRN
Status: DISCONTINUED | OUTPATIENT
Start: 2023-06-16 | End: 2023-06-21

## 2023-06-16 RX ORDER — SODIUM CHLORIDE 0.9 % (FLUSH) 0.9 %
10 SYRINGE (ML) INJECTION AS NEEDED
Status: CANCELLED | OUTPATIENT
Start: 2023-06-16

## 2023-06-16 RX ORDER — ACETAMINOPHEN 650 MG/1
650 SUPPOSITORY RECTAL EVERY 4 HOURS PRN
Status: CANCELLED | OUTPATIENT
Start: 2023-06-16

## 2023-06-16 RX ORDER — BISACODYL 5 MG/1
5 TABLET, DELAYED RELEASE ORAL DAILY PRN
Status: CANCELLED | OUTPATIENT
Start: 2023-06-16

## 2023-06-16 RX ORDER — HYDRALAZINE HYDROCHLORIDE 20 MG/ML
20 INJECTION INTRAMUSCULAR; INTRAVENOUS EVERY 6 HOURS PRN
Status: CANCELLED | OUTPATIENT
Start: 2023-06-16

## 2023-06-16 RX ORDER — BISACODYL 10 MG
10 SUPPOSITORY, RECTAL RECTAL DAILY PRN
Status: ACTIVE | OUTPATIENT
Start: 2023-06-16

## 2023-06-16 RX ORDER — GLIMEPIRIDE 2 MG/1
1 TABLET ORAL DAILY
Status: CANCELLED | OUTPATIENT
Start: 2023-06-16

## 2023-06-16 RX ORDER — SODIUM CHLORIDE 9 MG/ML
40 INJECTION, SOLUTION INTRAVENOUS AS NEEDED
Status: CANCELLED | OUTPATIENT
Start: 2023-06-16

## 2023-06-16 RX ORDER — SODIUM CHLORIDE 0.9 % (FLUSH) 0.9 %
10 SYRINGE (ML) INJECTION EVERY 12 HOURS SCHEDULED
Status: CANCELLED | OUTPATIENT
Start: 2023-06-16

## 2023-06-16 RX ORDER — HYDRALAZINE HYDROCHLORIDE 10 MG/1
10 TABLET, FILM COATED ORAL EVERY 6 HOURS PRN
Status: ACTIVE | OUTPATIENT
Start: 2023-06-16

## 2023-06-16 RX ORDER — AMOXICILLIN 250 MG
2 CAPSULE ORAL 2 TIMES DAILY
Status: DISPENSED | OUTPATIENT
Start: 2023-06-16

## 2023-06-16 RX ORDER — CLOPIDOGREL BISULFATE 75 MG/1
75 TABLET ORAL DAILY
Status: DISPENSED | OUTPATIENT
Start: 2023-06-17

## 2023-06-16 RX ORDER — ONDANSETRON 4 MG/1
4 TABLET, FILM COATED ORAL EVERY 6 HOURS PRN
Status: ACTIVE | OUTPATIENT
Start: 2023-06-16

## 2023-06-16 RX ORDER — NITROGLYCERIN 0.4 MG/1
0.4 TABLET SUBLINGUAL
Status: CANCELLED | OUTPATIENT
Start: 2023-06-16

## 2023-06-16 RX ORDER — ACETAMINOPHEN 325 MG/1
650 TABLET ORAL EVERY 4 HOURS PRN
Status: CANCELLED | OUTPATIENT
Start: 2023-06-16

## 2023-06-16 RX ORDER — ALUMINA, MAGNESIA, AND SIMETHICONE 2400; 2400; 240 MG/30ML; MG/30ML; MG/30ML
15 SUSPENSION ORAL EVERY 6 HOURS PRN
Status: ACTIVE | OUTPATIENT
Start: 2023-06-16

## 2023-06-16 RX ORDER — CLOPIDOGREL BISULFATE 75 MG/1
75 TABLET ORAL DAILY
Status: CANCELLED | OUTPATIENT
Start: 2023-06-16

## 2023-06-16 RX ORDER — LOSARTAN POTASSIUM 50 MG/1
50 TABLET ORAL
Status: DISPENSED | OUTPATIENT
Start: 2023-06-19

## 2023-06-16 RX ORDER — OMEGA-3S/DHA/EPA/FISH OIL/D3 300MG-1000
1000 CAPSULE ORAL DAILY
Status: DISPENSED | OUTPATIENT
Start: 2023-06-17

## 2023-06-16 RX ORDER — ONDANSETRON 2 MG/ML
4 INJECTION INTRAMUSCULAR; INTRAVENOUS EVERY 6 HOURS PRN
Status: CANCELLED | OUTPATIENT
Start: 2023-06-16

## 2023-06-16 RX ORDER — HYDROCODONE BITARTRATE AND ACETAMINOPHEN 5; 325 MG/1; MG/1
1 TABLET ORAL EVERY 4 HOURS PRN
Status: CANCELLED | OUTPATIENT
Start: 2023-06-16 | End: 2023-06-19

## 2023-06-16 RX ORDER — BISACODYL 10 MG
10 SUPPOSITORY, RECTAL RECTAL DAILY PRN
Status: CANCELLED | OUTPATIENT
Start: 2023-06-16

## 2023-06-16 RX ORDER — PANTOPRAZOLE SODIUM 40 MG/1
40 TABLET, DELAYED RELEASE ORAL
Status: CANCELLED | OUTPATIENT
Start: 2023-06-17

## 2023-06-16 RX ADMIN — DOCUSATE SODIUM 50MG AND SENNOSIDES 8.6MG 2 TABLET: 8.6; 5 TABLET, FILM COATED ORAL at 09:59

## 2023-06-16 RX ADMIN — CLOPIDOGREL BISULFATE 75 MG: 75 TABLET, FILM COATED ORAL at 09:59

## 2023-06-16 RX ADMIN — GABAPENTIN 300 MG: 300 CAPSULE ORAL at 20:45

## 2023-06-16 RX ADMIN — CALCIUM CARBONATE-VITAMIN D TAB 500 MG-200 UNIT 1 TABLET: 500-200 TAB at 09:59

## 2023-06-16 RX ADMIN — PANTOPRAZOLE SODIUM 40 MG: 40 TABLET, DELAYED RELEASE ORAL at 06:26

## 2023-06-16 RX ADMIN — ATORVASTATIN CALCIUM 40 MG: 20 TABLET, FILM COATED ORAL at 20:45

## 2023-06-16 RX ADMIN — Medication 10 ML: at 10:00

## 2023-06-16 RX ADMIN — CALCIUM CARBONATE-VITAMIN D TAB 500 MG-200 UNIT 1 TABLET: 500-200 TAB at 20:45

## 2023-06-16 RX ADMIN — ASPIRIN 81 MG: 81 TABLET, COATED ORAL at 09:59

## 2023-06-16 RX ADMIN — CHOLECALCIFEROL TAB 10 MCG (400 UNIT) 1000 UNITS: 10 TAB at 09:59

## 2023-06-16 RX ADMIN — SENNOSIDES AND DOCUSATE SODIUM 2 TABLET: 50; 8.6 TABLET ORAL at 20:45

## 2023-06-17 PROCEDURE — 97162 PT EVAL MOD COMPLEX 30 MIN: CPT

## 2023-06-17 PROCEDURE — 96125 COGNITIVE TEST BY HC PRO: CPT

## 2023-06-17 PROCEDURE — 97166 OT EVAL MOD COMPLEX 45 MIN: CPT

## 2023-06-17 PROCEDURE — 97110 THERAPEUTIC EXERCISES: CPT

## 2023-06-17 RX ADMIN — PANTOPRAZOLE SODIUM 40 MG: 40 TABLET, DELAYED RELEASE ORAL at 05:45

## 2023-06-17 RX ADMIN — SENNOSIDES AND DOCUSATE SODIUM 2 TABLET: 50; 8.6 TABLET ORAL at 07:44

## 2023-06-17 RX ADMIN — CALCIUM CARBONATE-VITAMIN D TAB 500 MG-200 UNIT 1 TABLET: 500-200 TAB at 20:41

## 2023-06-17 RX ADMIN — TIMOLOL MALEATE 1 DROP: 2.5 SOLUTION OPHTHALMIC at 07:44

## 2023-06-17 RX ADMIN — GABAPENTIN 300 MG: 300 CAPSULE ORAL at 20:41

## 2023-06-17 RX ADMIN — ATORVASTATIN CALCIUM 40 MG: 20 TABLET, FILM COATED ORAL at 20:41

## 2023-06-17 RX ADMIN — SENNOSIDES AND DOCUSATE SODIUM 2 TABLET: 50; 8.6 TABLET ORAL at 20:41

## 2023-06-17 RX ADMIN — CLOPIDOGREL BISULFATE 75 MG: 75 TABLET, FILM COATED ORAL at 07:45

## 2023-06-17 RX ADMIN — ASPIRIN 81 MG: 81 TABLET, COATED ORAL at 07:44

## 2023-06-17 RX ADMIN — CHOLECALCIFEROL TAB 10 MCG (400 UNIT) 1000 UNITS: 10 TAB at 07:45

## 2023-06-17 RX ADMIN — CALCIUM CARBONATE-VITAMIN D TAB 500 MG-200 UNIT 1 TABLET: 500-200 TAB at 07:45

## 2023-06-18 RX ADMIN — CALCIUM CARBONATE-VITAMIN D TAB 500 MG-200 UNIT 1 TABLET: 500-200 TAB at 21:30

## 2023-06-18 RX ADMIN — ASPIRIN 81 MG: 81 TABLET, COATED ORAL at 08:58

## 2023-06-18 RX ADMIN — PANTOPRAZOLE SODIUM 40 MG: 40 TABLET, DELAYED RELEASE ORAL at 06:24

## 2023-06-18 RX ADMIN — CHOLECALCIFEROL TAB 10 MCG (400 UNIT) 1000 UNITS: 10 TAB at 08:58

## 2023-06-18 RX ADMIN — CLOPIDOGREL BISULFATE 75 MG: 75 TABLET, FILM COATED ORAL at 08:58

## 2023-06-18 RX ADMIN — TIMOLOL MALEATE 1 DROP: 2.5 SOLUTION OPHTHALMIC at 08:58

## 2023-06-18 RX ADMIN — SENNOSIDES AND DOCUSATE SODIUM 1 TABLET: 50; 8.6 TABLET ORAL at 21:30

## 2023-06-18 RX ADMIN — CALCIUM CARBONATE-VITAMIN D TAB 500 MG-200 UNIT 1 TABLET: 500-200 TAB at 08:58

## 2023-06-18 RX ADMIN — SENNOSIDES AND DOCUSATE SODIUM 2 TABLET: 50; 8.6 TABLET ORAL at 08:58

## 2023-06-18 RX ADMIN — GABAPENTIN 300 MG: 300 CAPSULE ORAL at 21:30

## 2023-06-18 RX ADMIN — ATORVASTATIN CALCIUM 40 MG: 20 TABLET, FILM COATED ORAL at 21:30

## 2023-06-19 LAB
25(OH)D3 SERPL-MCNC: 25.7 NG/ML (ref 30–100)
ANION GAP SERPL CALCULATED.3IONS-SCNC: 8.8 MMOL/L (ref 5–15)
BASOPHILS # BLD AUTO: 0.03 10*3/MM3 (ref 0–0.2)
BASOPHILS NFR BLD AUTO: 0.5 % (ref 0–1.5)
BUN SERPL-MCNC: 10 MG/DL (ref 8–23)
BUN/CREAT SERPL: 21.7 (ref 7–25)
CALCIUM SPEC-SCNC: 9.5 MG/DL (ref 8.2–9.6)
CHLORIDE SERPL-SCNC: 99 MMOL/L (ref 98–107)
CO2 SERPL-SCNC: 28.2 MMOL/L (ref 22–29)
CREAT SERPL-MCNC: 0.46 MG/DL (ref 0.57–1)
DEPRECATED RDW RBC AUTO: 43.9 FL (ref 37–54)
EGFRCR SERPLBLD CKD-EPI 2021: 91 ML/MIN/1.73
EOSINOPHIL # BLD AUTO: 0.14 10*3/MM3 (ref 0–0.4)
EOSINOPHIL NFR BLD AUTO: 2.2 % (ref 0.3–6.2)
ERYTHROCYTE [DISTWIDTH] IN BLOOD BY AUTOMATED COUNT: 12.8 % (ref 12.3–15.4)
GLUCOSE SERPL-MCNC: 94 MG/DL (ref 65–99)
HCT VFR BLD AUTO: 37.1 % (ref 34–46.6)
HGB BLD-MCNC: 12.3 G/DL (ref 12–15.9)
IMM GRANULOCYTES # BLD AUTO: 0.02 10*3/MM3 (ref 0–0.05)
IMM GRANULOCYTES NFR BLD AUTO: 0.3 % (ref 0–0.5)
LYMPHOCYTES # BLD AUTO: 1.31 10*3/MM3 (ref 0.7–3.1)
LYMPHOCYTES NFR BLD AUTO: 20.6 % (ref 19.6–45.3)
MCH RBC QN AUTO: 31 PG (ref 26.6–33)
MCHC RBC AUTO-ENTMCNC: 33.2 G/DL (ref 31.5–35.7)
MCV RBC AUTO: 93.5 FL (ref 79–97)
MONOCYTES # BLD AUTO: 0.68 10*3/MM3 (ref 0.1–0.9)
MONOCYTES NFR BLD AUTO: 10.7 % (ref 5–12)
NEUTROPHILS NFR BLD AUTO: 4.17 10*3/MM3 (ref 1.7–7)
NEUTROPHILS NFR BLD AUTO: 65.7 % (ref 42.7–76)
NRBC BLD AUTO-RTO: 0 /100 WBC (ref 0–0.2)
PLATELET # BLD AUTO: 214 10*3/MM3 (ref 140–450)
PMV BLD AUTO: 11 FL (ref 6–12)
POTASSIUM SERPL-SCNC: 3.7 MMOL/L (ref 3.5–5.2)
RBC # BLD AUTO: 3.97 10*6/MM3 (ref 3.77–5.28)
SODIUM SERPL-SCNC: 136 MMOL/L (ref 136–145)
WBC NRBC COR # BLD: 6.35 10*3/MM3 (ref 3.4–10.8)

## 2023-06-19 PROCEDURE — 97112 NEUROMUSCULAR REEDUCATION: CPT

## 2023-06-19 PROCEDURE — 97535 SELF CARE MNGMENT TRAINING: CPT

## 2023-06-19 PROCEDURE — 97112 NEUROMUSCULAR REEDUCATION: CPT | Performed by: PHYSICAL THERAPIST

## 2023-06-19 PROCEDURE — 97530 THERAPEUTIC ACTIVITIES: CPT | Performed by: PHYSICAL THERAPIST

## 2023-06-19 PROCEDURE — 82306 VITAMIN D 25 HYDROXY: CPT | Performed by: PHYSICAL MEDICINE & REHABILITATION

## 2023-06-19 PROCEDURE — 85025 COMPLETE CBC W/AUTO DIFF WBC: CPT | Performed by: PHYSICAL MEDICINE & REHABILITATION

## 2023-06-19 PROCEDURE — 80048 BASIC METABOLIC PNL TOTAL CA: CPT | Performed by: PHYSICAL MEDICINE & REHABILITATION

## 2023-06-19 PROCEDURE — 97129 THER IVNTJ 1ST 15 MIN: CPT

## 2023-06-19 PROCEDURE — 97130 THER IVNTJ EA ADDL 15 MIN: CPT

## 2023-06-19 PROCEDURE — 92610 EVALUATE SWALLOWING FUNCTION: CPT

## 2023-06-19 RX ORDER — OMEPRAZOLE 20 MG/1
20 CAPSULE, DELAYED RELEASE ORAL 2 TIMES DAILY
Status: ON HOLD | COMMUNITY

## 2023-06-19 RX ORDER — TIMOLOL MALEATE 5 MG/ML
1 SOLUTION/ DROPS OPHTHALMIC EVERY MORNING
Status: ON HOLD | COMMUNITY

## 2023-06-19 RX ADMIN — CALCIUM CARBONATE-VITAMIN D TAB 500 MG-200 UNIT 1 TABLET: 500-200 TAB at 09:23

## 2023-06-19 RX ADMIN — GABAPENTIN 300 MG: 300 CAPSULE ORAL at 22:47

## 2023-06-19 RX ADMIN — ATORVASTATIN CALCIUM 40 MG: 20 TABLET, FILM COATED ORAL at 22:47

## 2023-06-19 RX ADMIN — CHOLECALCIFEROL TAB 10 MCG (400 UNIT) 1000 UNITS: 10 TAB at 09:23

## 2023-06-19 RX ADMIN — ASPIRIN 81 MG: 81 TABLET, COATED ORAL at 09:24

## 2023-06-19 RX ADMIN — SENNOSIDES AND DOCUSATE SODIUM 2 TABLET: 50; 8.6 TABLET ORAL at 22:47

## 2023-06-19 RX ADMIN — TIMOLOL MALEATE 1 DROP: 2.5 SOLUTION OPHTHALMIC at 09:31

## 2023-06-19 RX ADMIN — SENNOSIDES AND DOCUSATE SODIUM 2 TABLET: 50; 8.6 TABLET ORAL at 09:23

## 2023-06-19 RX ADMIN — PANTOPRAZOLE SODIUM 40 MG: 40 TABLET, DELAYED RELEASE ORAL at 06:14

## 2023-06-19 RX ADMIN — LOSARTAN POTASSIUM 50 MG: 50 TABLET, FILM COATED ORAL at 09:23

## 2023-06-19 RX ADMIN — CLOPIDOGREL BISULFATE 75 MG: 75 TABLET, FILM COATED ORAL at 09:23

## 2023-06-19 RX ADMIN — CALCIUM CARBONATE-VITAMIN D TAB 500 MG-200 UNIT 1 TABLET: 500-200 TAB at 22:47

## 2023-06-20 PROCEDURE — 97112 NEUROMUSCULAR REEDUCATION: CPT

## 2023-06-20 PROCEDURE — 92507 TX SP LANG VOICE COMM INDIV: CPT

## 2023-06-20 PROCEDURE — 97535 SELF CARE MNGMENT TRAINING: CPT

## 2023-06-20 PROCEDURE — 97110 THERAPEUTIC EXERCISES: CPT

## 2023-06-20 PROCEDURE — 97530 THERAPEUTIC ACTIVITIES: CPT

## 2023-06-20 RX ADMIN — CHOLECALCIFEROL TAB 10 MCG (400 UNIT) 1000 UNITS: 10 TAB at 07:08

## 2023-06-20 RX ADMIN — SENNOSIDES AND DOCUSATE SODIUM 2 TABLET: 50; 8.6 TABLET ORAL at 07:08

## 2023-06-20 RX ADMIN — CALCIUM CARBONATE-VITAMIN D TAB 500 MG-200 UNIT 1 TABLET: 500-200 TAB at 07:08

## 2023-06-20 RX ADMIN — TIMOLOL MALEATE 1 DROP: 2.5 SOLUTION OPHTHALMIC at 07:11

## 2023-06-20 RX ADMIN — ATORVASTATIN CALCIUM 40 MG: 20 TABLET, FILM COATED ORAL at 21:23

## 2023-06-20 RX ADMIN — SENNOSIDES AND DOCUSATE SODIUM 2 TABLET: 50; 8.6 TABLET ORAL at 21:23

## 2023-06-20 RX ADMIN — CALCIUM CARBONATE-VITAMIN D TAB 500 MG-200 UNIT 1 TABLET: 500-200 TAB at 21:23

## 2023-06-20 RX ADMIN — PANTOPRAZOLE SODIUM 40 MG: 40 TABLET, DELAYED RELEASE ORAL at 05:40

## 2023-06-20 RX ADMIN — CLOPIDOGREL BISULFATE 75 MG: 75 TABLET, FILM COATED ORAL at 07:07

## 2023-06-20 RX ADMIN — GABAPENTIN 300 MG: 300 CAPSULE ORAL at 21:23

## 2023-06-20 RX ADMIN — LOSARTAN POTASSIUM 50 MG: 50 TABLET, FILM COATED ORAL at 07:07

## 2023-06-20 RX ADMIN — ASPIRIN 81 MG: 81 TABLET, COATED ORAL at 07:07

## 2023-06-21 PROCEDURE — 97110 THERAPEUTIC EXERCISES: CPT

## 2023-06-21 PROCEDURE — 97530 THERAPEUTIC ACTIVITIES: CPT

## 2023-06-21 PROCEDURE — 97112 NEUROMUSCULAR REEDUCATION: CPT

## 2023-06-21 PROCEDURE — 97129 THER IVNTJ 1ST 15 MIN: CPT

## 2023-06-21 PROCEDURE — 97130 THER IVNTJ EA ADDL 15 MIN: CPT

## 2023-06-21 PROCEDURE — 97535 SELF CARE MNGMENT TRAINING: CPT

## 2023-06-21 RX ADMIN — LOSARTAN POTASSIUM 50 MG: 50 TABLET, FILM COATED ORAL at 07:43

## 2023-06-21 RX ADMIN — ASPIRIN 81 MG: 81 TABLET, COATED ORAL at 07:42

## 2023-06-21 RX ADMIN — SENNOSIDES AND DOCUSATE SODIUM 2 TABLET: 50; 8.6 TABLET ORAL at 07:41

## 2023-06-21 RX ADMIN — CALCIUM CARBONATE-VITAMIN D TAB 500 MG-200 UNIT 1 TABLET: 500-200 TAB at 20:07

## 2023-06-21 RX ADMIN — GABAPENTIN 300 MG: 300 CAPSULE ORAL at 20:07

## 2023-06-21 RX ADMIN — PANTOPRAZOLE SODIUM 40 MG: 40 TABLET, DELAYED RELEASE ORAL at 05:49

## 2023-06-21 RX ADMIN — CALCIUM CARBONATE-VITAMIN D TAB 500 MG-200 UNIT 1 TABLET: 500-200 TAB at 07:42

## 2023-06-21 RX ADMIN — ATORVASTATIN CALCIUM 40 MG: 20 TABLET, FILM COATED ORAL at 20:07

## 2023-06-21 RX ADMIN — CLOPIDOGREL BISULFATE 75 MG: 75 TABLET, FILM COATED ORAL at 07:43

## 2023-06-21 RX ADMIN — TIMOLOL MALEATE 1 DROP: 2.5 SOLUTION OPHTHALMIC at 07:44

## 2023-06-21 RX ADMIN — CHOLECALCIFEROL TAB 10 MCG (400 UNIT) 1000 UNITS: 10 TAB at 07:42

## 2023-06-22 PROCEDURE — 97112 NEUROMUSCULAR REEDUCATION: CPT

## 2023-06-22 PROCEDURE — 97530 THERAPEUTIC ACTIVITIES: CPT

## 2023-06-22 PROCEDURE — 97110 THERAPEUTIC EXERCISES: CPT

## 2023-06-22 PROCEDURE — 97130 THER IVNTJ EA ADDL 15 MIN: CPT

## 2023-06-22 PROCEDURE — 97129 THER IVNTJ 1ST 15 MIN: CPT

## 2023-06-22 RX ADMIN — TIMOLOL MALEATE 1 DROP: 2.5 SOLUTION OPHTHALMIC at 08:08

## 2023-06-22 RX ADMIN — LOSARTAN POTASSIUM 50 MG: 50 TABLET, FILM COATED ORAL at 08:03

## 2023-06-22 RX ADMIN — SENNOSIDES AND DOCUSATE SODIUM 1 TABLET: 50; 8.6 TABLET ORAL at 20:01

## 2023-06-22 RX ADMIN — ASPIRIN 81 MG: 81 TABLET, COATED ORAL at 08:03

## 2023-06-22 RX ADMIN — CLOPIDOGREL BISULFATE 75 MG: 75 TABLET, FILM COATED ORAL at 08:07

## 2023-06-22 RX ADMIN — CALCIUM CARBONATE-VITAMIN D TAB 500 MG-200 UNIT 1 TABLET: 500-200 TAB at 08:05

## 2023-06-22 RX ADMIN — GABAPENTIN 300 MG: 300 CAPSULE ORAL at 20:00

## 2023-06-22 RX ADMIN — CHOLECALCIFEROL TAB 10 MCG (400 UNIT) 1000 UNITS: 10 TAB at 08:05

## 2023-06-22 RX ADMIN — PANTOPRAZOLE SODIUM 40 MG: 40 TABLET, DELAYED RELEASE ORAL at 05:21

## 2023-06-22 RX ADMIN — ATORVASTATIN CALCIUM 40 MG: 20 TABLET, FILM COATED ORAL at 20:01

## 2023-06-22 RX ADMIN — SENNOSIDES AND DOCUSATE SODIUM 1 TABLET: 50; 8.6 TABLET ORAL at 08:07

## 2023-06-22 RX ADMIN — CALCIUM CARBONATE-VITAMIN D TAB 500 MG-200 UNIT 1 TABLET: 500-200 TAB at 20:01

## 2023-06-23 PROCEDURE — 97535 SELF CARE MNGMENT TRAINING: CPT

## 2023-06-23 PROCEDURE — 97110 THERAPEUTIC EXERCISES: CPT

## 2023-06-23 PROCEDURE — 97129 THER IVNTJ 1ST 15 MIN: CPT

## 2023-06-23 PROCEDURE — 97530 THERAPEUTIC ACTIVITIES: CPT

## 2023-06-23 PROCEDURE — 97112 NEUROMUSCULAR REEDUCATION: CPT

## 2023-06-23 PROCEDURE — 97130 THER IVNTJ EA ADDL 15 MIN: CPT

## 2023-06-23 RX ADMIN — TIMOLOL MALEATE 1 DROP: 2.5 SOLUTION OPHTHALMIC at 07:43

## 2023-06-23 RX ADMIN — CALCIUM CARBONATE-VITAMIN D TAB 500 MG-200 UNIT 1 TABLET: 500-200 TAB at 22:11

## 2023-06-23 RX ADMIN — LOSARTAN POTASSIUM 50 MG: 50 TABLET, FILM COATED ORAL at 07:41

## 2023-06-23 RX ADMIN — ATORVASTATIN CALCIUM 40 MG: 20 TABLET, FILM COATED ORAL at 19:24

## 2023-06-23 RX ADMIN — CLOPIDOGREL BISULFATE 75 MG: 75 TABLET, FILM COATED ORAL at 07:41

## 2023-06-23 RX ADMIN — CALCIUM CARBONATE-VITAMIN D TAB 500 MG-200 UNIT 1 TABLET: 500-200 TAB at 07:41

## 2023-06-23 RX ADMIN — CHOLECALCIFEROL TAB 10 MCG (400 UNIT) 1000 UNITS: 10 TAB at 07:41

## 2023-06-23 RX ADMIN — SENNOSIDES AND DOCUSATE SODIUM 2 TABLET: 50; 8.6 TABLET ORAL at 19:24

## 2023-06-23 RX ADMIN — PANTOPRAZOLE SODIUM 40 MG: 40 TABLET, DELAYED RELEASE ORAL at 05:32

## 2023-06-23 RX ADMIN — GABAPENTIN 300 MG: 300 CAPSULE ORAL at 19:24

## 2023-06-23 RX ADMIN — ASPIRIN 81 MG: 81 TABLET, COATED ORAL at 07:42

## 2023-06-23 RX ADMIN — SENNOSIDES AND DOCUSATE SODIUM 2 TABLET: 50; 8.6 TABLET ORAL at 07:40

## 2023-06-24 PROCEDURE — 97110 THERAPEUTIC EXERCISES: CPT | Performed by: PHYSICAL THERAPIST

## 2023-06-24 PROCEDURE — 97112 NEUROMUSCULAR REEDUCATION: CPT | Performed by: PHYSICAL THERAPIST

## 2023-06-24 RX ADMIN — LOSARTAN POTASSIUM 50 MG: 50 TABLET, FILM COATED ORAL at 07:51

## 2023-06-24 RX ADMIN — ATORVASTATIN CALCIUM 40 MG: 20 TABLET, FILM COATED ORAL at 20:40

## 2023-06-24 RX ADMIN — SENNOSIDES AND DOCUSATE SODIUM 1 TABLET: 50; 8.6 TABLET ORAL at 20:40

## 2023-06-24 RX ADMIN — CALCIUM CARBONATE-VITAMIN D TAB 500 MG-200 UNIT 1 TABLET: 500-200 TAB at 20:40

## 2023-06-24 RX ADMIN — CALCIUM CARBONATE-VITAMIN D TAB 500 MG-200 UNIT 1 TABLET: 500-200 TAB at 07:52

## 2023-06-24 RX ADMIN — PANTOPRAZOLE SODIUM 40 MG: 40 TABLET, DELAYED RELEASE ORAL at 05:24

## 2023-06-24 RX ADMIN — CLOPIDOGREL BISULFATE 75 MG: 75 TABLET, FILM COATED ORAL at 07:51

## 2023-06-24 RX ADMIN — CHOLECALCIFEROL TAB 10 MCG (400 UNIT) 1000 UNITS: 10 TAB at 07:52

## 2023-06-24 RX ADMIN — GABAPENTIN 300 MG: 300 CAPSULE ORAL at 20:40

## 2023-06-24 RX ADMIN — TIMOLOL MALEATE 1 DROP: 2.5 SOLUTION OPHTHALMIC at 07:53

## 2023-06-24 RX ADMIN — SENNOSIDES AND DOCUSATE SODIUM 2 TABLET: 50; 8.6 TABLET ORAL at 07:52

## 2023-06-24 RX ADMIN — ASPIRIN 81 MG: 81 TABLET, COATED ORAL at 07:51

## 2023-06-25 RX ADMIN — LOSARTAN POTASSIUM 50 MG: 50 TABLET, FILM COATED ORAL at 08:07

## 2023-06-25 RX ADMIN — SENNOSIDES AND DOCUSATE SODIUM 2 TABLET: 50; 8.6 TABLET ORAL at 08:06

## 2023-06-25 RX ADMIN — GABAPENTIN 300 MG: 300 CAPSULE ORAL at 21:12

## 2023-06-25 RX ADMIN — CHOLECALCIFEROL TAB 10 MCG (400 UNIT) 1000 UNITS: 10 TAB at 08:06

## 2023-06-25 RX ADMIN — TIMOLOL MALEATE 1 DROP: 2.5 SOLUTION OPHTHALMIC at 08:07

## 2023-06-25 RX ADMIN — CLOPIDOGREL BISULFATE 75 MG: 75 TABLET, FILM COATED ORAL at 08:07

## 2023-06-25 RX ADMIN — PANTOPRAZOLE SODIUM 40 MG: 40 TABLET, DELAYED RELEASE ORAL at 05:36

## 2023-06-25 RX ADMIN — CALCIUM CARBONATE-VITAMIN D TAB 500 MG-200 UNIT 1 TABLET: 500-200 TAB at 08:07

## 2023-06-25 RX ADMIN — ASPIRIN 81 MG: 81 TABLET, COATED ORAL at 08:07

## 2023-06-25 RX ADMIN — SENNOSIDES AND DOCUSATE SODIUM 2 TABLET: 50; 8.6 TABLET ORAL at 21:12

## 2023-06-25 RX ADMIN — CALCIUM CARBONATE-VITAMIN D TAB 500 MG-200 UNIT 1 TABLET: 500-200 TAB at 21:12

## 2023-06-25 RX ADMIN — ATORVASTATIN CALCIUM 40 MG: 20 TABLET, FILM COATED ORAL at 21:12

## 2023-06-26 VITALS
TEMPERATURE: 98 F | RESPIRATION RATE: 18 BRPM | BODY MASS INDEX: 24.98 KG/M2 | WEIGHT: 159.17 LBS | HEIGHT: 67 IN | DIASTOLIC BLOOD PRESSURE: 72 MMHG | HEART RATE: 71 BPM | SYSTOLIC BLOOD PRESSURE: 157 MMHG | OXYGEN SATURATION: 94 %

## 2023-06-26 LAB
ANION GAP SERPL CALCULATED.3IONS-SCNC: 7.5 MMOL/L (ref 5–15)
BASOPHILS # BLD AUTO: 0.03 10*3/MM3 (ref 0–0.2)
BASOPHILS NFR BLD AUTO: 0.6 % (ref 0–1.5)
BUN SERPL-MCNC: 9 MG/DL (ref 8–23)
BUN/CREAT SERPL: 15.5 (ref 7–25)
CALCIUM SPEC-SCNC: 9.9 MG/DL (ref 8.2–9.6)
CHLORIDE SERPL-SCNC: 98 MMOL/L (ref 98–107)
CO2 SERPL-SCNC: 30.5 MMOL/L (ref 22–29)
CREAT SERPL-MCNC: 0.58 MG/DL (ref 0.57–1)
DEPRECATED RDW RBC AUTO: 42.6 FL (ref 37–54)
EGFRCR SERPLBLD CKD-EPI 2021: 86.1 ML/MIN/1.73
EOSINOPHIL # BLD AUTO: 0.15 10*3/MM3 (ref 0–0.4)
EOSINOPHIL NFR BLD AUTO: 2.9 % (ref 0.3–6.2)
ERYTHROCYTE [DISTWIDTH] IN BLOOD BY AUTOMATED COUNT: 12.6 % (ref 12.3–15.4)
GLUCOSE SERPL-MCNC: 94 MG/DL (ref 65–99)
HCT VFR BLD AUTO: 38.8 % (ref 34–46.6)
HGB BLD-MCNC: 13.2 G/DL (ref 12–15.9)
IMM GRANULOCYTES # BLD AUTO: 0.02 10*3/MM3 (ref 0–0.05)
IMM GRANULOCYTES NFR BLD AUTO: 0.4 % (ref 0–0.5)
LYMPHOCYTES # BLD AUTO: 1.18 10*3/MM3 (ref 0.7–3.1)
LYMPHOCYTES NFR BLD AUTO: 22.8 % (ref 19.6–45.3)
MCH RBC QN AUTO: 31.7 PG (ref 26.6–33)
MCHC RBC AUTO-ENTMCNC: 34 G/DL (ref 31.5–35.7)
MCV RBC AUTO: 93 FL (ref 79–97)
MONOCYTES # BLD AUTO: 0.53 10*3/MM3 (ref 0.1–0.9)
MONOCYTES NFR BLD AUTO: 10.2 % (ref 5–12)
NEUTROPHILS NFR BLD AUTO: 3.27 10*3/MM3 (ref 1.7–7)
NEUTROPHILS NFR BLD AUTO: 63.1 % (ref 42.7–76)
NRBC BLD AUTO-RTO: 0 /100 WBC (ref 0–0.2)
PLATELET # BLD AUTO: 252 10*3/MM3 (ref 140–450)
PMV BLD AUTO: 10.5 FL (ref 6–12)
POTASSIUM SERPL-SCNC: 4.1 MMOL/L (ref 3.5–5.2)
RBC # BLD AUTO: 4.17 10*6/MM3 (ref 3.77–5.28)
SODIUM SERPL-SCNC: 136 MMOL/L (ref 136–145)
WBC NRBC COR # BLD: 5.18 10*3/MM3 (ref 3.4–10.8)

## 2023-06-26 PROCEDURE — 97130 THER IVNTJ EA ADDL 15 MIN: CPT

## 2023-06-26 PROCEDURE — 97112 NEUROMUSCULAR REEDUCATION: CPT | Performed by: PHYSICAL THERAPIST

## 2023-06-26 PROCEDURE — 97110 THERAPEUTIC EXERCISES: CPT | Performed by: PHYSICAL THERAPIST

## 2023-06-26 PROCEDURE — 97110 THERAPEUTIC EXERCISES: CPT

## 2023-06-26 PROCEDURE — 97129 THER IVNTJ 1ST 15 MIN: CPT

## 2023-06-26 PROCEDURE — 97530 THERAPEUTIC ACTIVITIES: CPT | Performed by: PHYSICAL THERAPIST

## 2023-06-26 RX ADMIN — GABAPENTIN 300 MG: 300 CAPSULE ORAL at 19:50

## 2023-06-26 RX ADMIN — CALCIUM CARBONATE-VITAMIN D TAB 500 MG-200 UNIT 1 TABLET: 500-200 TAB at 07:52

## 2023-06-26 RX ADMIN — SENNOSIDES AND DOCUSATE SODIUM 2 TABLET: 50; 8.6 TABLET ORAL at 19:50

## 2023-06-26 RX ADMIN — ATORVASTATIN CALCIUM 40 MG: 20 TABLET, FILM COATED ORAL at 19:50

## 2023-06-26 RX ADMIN — LOSARTAN POTASSIUM 50 MG: 50 TABLET, FILM COATED ORAL at 07:51

## 2023-06-26 RX ADMIN — CALCIUM CARBONATE-VITAMIN D TAB 500 MG-200 UNIT 1 TABLET: 500-200 TAB at 19:49

## 2023-06-26 RX ADMIN — CHOLECALCIFEROL TAB 10 MCG (400 UNIT) 1000 UNITS: 10 TAB at 07:51

## 2023-06-26 RX ADMIN — SENNOSIDES AND DOCUSATE SODIUM 2 TABLET: 50; 8.6 TABLET ORAL at 07:52

## 2023-06-26 RX ADMIN — ASPIRIN 81 MG: 81 TABLET, COATED ORAL at 07:51

## 2023-06-26 RX ADMIN — CLOPIDOGREL BISULFATE 75 MG: 75 TABLET, FILM COATED ORAL at 07:51

## 2023-06-26 RX ADMIN — PANTOPRAZOLE SODIUM 40 MG: 40 TABLET, DELAYED RELEASE ORAL at 06:28

## 2023-06-27 ENCOUNTER — TELEPHONE (OUTPATIENT)
Dept: NEUROLOGY | Facility: CLINIC | Age: 88
End: 2023-06-27

## 2023-07-09 PROBLEM — I10 ESSENTIAL HYPERTENSION: Status: ACTIVE | Noted: 2023-07-09

## 2023-07-09 PROBLEM — E78.5 HYPERLIPIDEMIA: Status: ACTIVE | Noted: 2023-07-09

## 2023-07-09 PROBLEM — K21.9 GERD WITHOUT ESOPHAGITIS: Status: ACTIVE | Noted: 2023-07-09

## 2023-07-09 PROBLEM — I48.91 ATRIAL FIBRILLATION WITH RVR: Status: ACTIVE | Noted: 2023-07-09

## 2023-07-10 ENCOUNTER — APPOINTMENT (OUTPATIENT)
Dept: PHYSICAL THERAPY | Facility: HOSPITAL | Age: 88
End: 2023-07-10
Payer: MEDICARE

## 2023-07-10 PROBLEM — Z86.73 HISTORY OF STROKE: Status: ACTIVE | Noted: 2023-07-10

## 2023-07-24 ENCOUNTER — HOSPITAL ENCOUNTER (OUTPATIENT)
Dept: PHYSICAL THERAPY | Facility: HOSPITAL | Age: 88
Setting detail: THERAPIES SERIES
Discharge: HOME | End: 2023-07-24
Payer: MEDICARE

## 2023-07-24 DIAGNOSIS — R26.89 FUNCTIONAL GAIT ABNORMALITY: ICD-10-CM

## 2023-07-24 DIAGNOSIS — M62.81 MUSCLE WEAKNESS: ICD-10-CM

## 2023-07-24 DIAGNOSIS — I63.9 CEREBROVASCULAR ACCIDENT (CVA), UNSPECIFIED MECHANISM: Primary | ICD-10-CM

## 2023-07-24 PROCEDURE — 97112 NEUROMUSCULAR REEDUCATION: CPT | Performed by: PHYSICAL THERAPIST

## 2023-07-24 PROCEDURE — 97110 THERAPEUTIC EXERCISES: CPT | Performed by: PHYSICAL THERAPIST

## 2023-07-27 ENCOUNTER — HOSPITAL ENCOUNTER (OUTPATIENT)
Dept: PHYSICAL THERAPY | Facility: HOSPITAL | Age: 88
Setting detail: THERAPIES SERIES
Discharge: HOME | End: 2023-07-27
Payer: MEDICARE

## 2023-07-27 DIAGNOSIS — R26.89 FUNCTIONAL GAIT ABNORMALITY: ICD-10-CM

## 2023-07-27 DIAGNOSIS — I63.9 CEREBROVASCULAR ACCIDENT (CVA), UNSPECIFIED MECHANISM: Primary | ICD-10-CM

## 2023-07-27 DIAGNOSIS — M62.81 MUSCLE WEAKNESS: ICD-10-CM

## 2023-07-27 PROCEDURE — 97530 THERAPEUTIC ACTIVITIES: CPT

## 2023-07-27 PROCEDURE — 97112 NEUROMUSCULAR REEDUCATION: CPT

## 2023-07-31 ENCOUNTER — HOSPITAL ENCOUNTER (OUTPATIENT)
Dept: PHYSICAL THERAPY | Facility: HOSPITAL | Age: 88
Setting detail: THERAPIES SERIES
Discharge: HOME | End: 2023-07-31
Payer: MEDICARE

## 2023-07-31 DIAGNOSIS — M62.81 MUSCLE WEAKNESS: ICD-10-CM

## 2023-07-31 DIAGNOSIS — I63.9 CEREBROVASCULAR ACCIDENT (CVA), UNSPECIFIED MECHANISM: Primary | ICD-10-CM

## 2023-07-31 DIAGNOSIS — R26.89 FUNCTIONAL GAIT ABNORMALITY: ICD-10-CM

## 2023-07-31 PROCEDURE — 97530 THERAPEUTIC ACTIVITIES: CPT

## 2023-07-31 PROCEDURE — 97112 NEUROMUSCULAR REEDUCATION: CPT

## 2023-08-03 ENCOUNTER — HOSPITAL ENCOUNTER (OUTPATIENT)
Dept: PHYSICAL THERAPY | Facility: HOSPITAL | Age: 88
Setting detail: THERAPIES SERIES
Discharge: HOME | End: 2023-08-03
Payer: MEDICARE

## 2023-08-03 DIAGNOSIS — R26.89 FUNCTIONAL GAIT ABNORMALITY: ICD-10-CM

## 2023-08-03 DIAGNOSIS — I63.9 CEREBROVASCULAR ACCIDENT (CVA), UNSPECIFIED MECHANISM: Primary | ICD-10-CM

## 2023-08-03 DIAGNOSIS — M62.81 MUSCLE WEAKNESS: ICD-10-CM

## 2023-08-03 PROCEDURE — 97112 NEUROMUSCULAR REEDUCATION: CPT

## 2023-08-03 PROCEDURE — 97530 THERAPEUTIC ACTIVITIES: CPT

## 2023-08-07 ENCOUNTER — APPOINTMENT (OUTPATIENT)
Dept: PHYSICAL THERAPY | Facility: HOSPITAL | Age: 88
End: 2023-08-07
Payer: MEDICARE

## 2023-08-10 ENCOUNTER — HOSPITAL ENCOUNTER (OUTPATIENT)
Dept: PHYSICAL THERAPY | Facility: HOSPITAL | Age: 88
Setting detail: THERAPIES SERIES
Discharge: HOME | End: 2023-08-10
Payer: MEDICARE

## 2023-08-10 DIAGNOSIS — I63.9 CEREBROVASCULAR ACCIDENT (CVA), UNSPECIFIED MECHANISM: Primary | ICD-10-CM

## 2023-08-10 DIAGNOSIS — M62.81 MUSCLE WEAKNESS: ICD-10-CM

## 2023-08-10 DIAGNOSIS — R26.89 FUNCTIONAL GAIT ABNORMALITY: ICD-10-CM

## 2023-08-10 PROCEDURE — 97112 NEUROMUSCULAR REEDUCATION: CPT

## 2023-08-10 PROCEDURE — 97530 THERAPEUTIC ACTIVITIES: CPT

## 2023-08-14 ENCOUNTER — HOSPITAL ENCOUNTER (OUTPATIENT)
Dept: PHYSICAL THERAPY | Facility: HOSPITAL | Age: 88
Setting detail: THERAPIES SERIES
Discharge: HOME | End: 2023-08-14
Payer: MEDICARE

## 2023-08-14 DIAGNOSIS — R26.89 FUNCTIONAL GAIT ABNORMALITY: ICD-10-CM

## 2023-08-14 DIAGNOSIS — I63.9 CEREBROVASCULAR ACCIDENT (CVA), UNSPECIFIED MECHANISM: Primary | ICD-10-CM

## 2023-08-14 DIAGNOSIS — M62.81 MUSCLE WEAKNESS: ICD-10-CM

## 2023-08-14 PROCEDURE — 97112 NEUROMUSCULAR REEDUCATION: CPT

## 2023-08-14 PROCEDURE — 97530 THERAPEUTIC ACTIVITIES: CPT

## 2023-08-17 ENCOUNTER — HOSPITAL ENCOUNTER (OUTPATIENT)
Dept: PHYSICAL THERAPY | Facility: HOSPITAL | Age: 88
Setting detail: THERAPIES SERIES
Discharge: HOME | End: 2023-08-17
Payer: MEDICARE

## 2023-08-17 DIAGNOSIS — M62.81 MUSCLE WEAKNESS: ICD-10-CM

## 2023-08-17 DIAGNOSIS — I63.9 CEREBROVASCULAR ACCIDENT (CVA), UNSPECIFIED MECHANISM: Primary | ICD-10-CM

## 2023-08-17 DIAGNOSIS — R26.89 FUNCTIONAL GAIT ABNORMALITY: ICD-10-CM

## 2023-08-17 PROCEDURE — 97530 THERAPEUTIC ACTIVITIES: CPT

## 2023-08-17 PROCEDURE — 97112 NEUROMUSCULAR REEDUCATION: CPT

## 2023-08-21 ENCOUNTER — HOSPITAL ENCOUNTER (OUTPATIENT)
Dept: PHYSICAL THERAPY | Facility: HOSPITAL | Age: 88
Setting detail: THERAPIES SERIES
Discharge: HOME | End: 2023-08-21
Payer: MEDICARE

## 2023-08-21 DIAGNOSIS — R26.89 FUNCTIONAL GAIT ABNORMALITY: ICD-10-CM

## 2023-08-21 DIAGNOSIS — M62.81 MUSCLE WEAKNESS: ICD-10-CM

## 2023-08-21 DIAGNOSIS — I63.9 CEREBROVASCULAR ACCIDENT (CVA), UNSPECIFIED MECHANISM: Primary | ICD-10-CM

## 2023-08-21 PROCEDURE — 97530 THERAPEUTIC ACTIVITIES: CPT

## 2023-08-21 PROCEDURE — 97112 NEUROMUSCULAR REEDUCATION: CPT

## 2023-08-24 ENCOUNTER — HOSPITAL ENCOUNTER (OUTPATIENT)
Dept: PHYSICAL THERAPY | Facility: HOSPITAL | Age: 88
Setting detail: THERAPIES SERIES
Discharge: HOME | End: 2023-08-24
Payer: MEDICARE

## 2023-08-24 DIAGNOSIS — R26.89 FUNCTIONAL GAIT ABNORMALITY: ICD-10-CM

## 2023-08-24 DIAGNOSIS — I63.9 CEREBROVASCULAR ACCIDENT (CVA), UNSPECIFIED MECHANISM: Primary | ICD-10-CM

## 2023-08-24 DIAGNOSIS — M62.81 MUSCLE WEAKNESS: ICD-10-CM

## 2023-08-24 PROCEDURE — 97112 NEUROMUSCULAR REEDUCATION: CPT

## 2023-08-24 PROCEDURE — 97530 THERAPEUTIC ACTIVITIES: CPT

## 2023-08-28 ENCOUNTER — APPOINTMENT (OUTPATIENT)
Dept: PHYSICAL THERAPY | Facility: HOSPITAL | Age: 88
End: 2023-08-28
Payer: MEDICARE

## 2023-08-31 ENCOUNTER — APPOINTMENT (OUTPATIENT)
Dept: PHYSICAL THERAPY | Facility: HOSPITAL | Age: 88
End: 2023-08-31
Payer: MEDICARE

## 2023-09-06 RX ORDER — DIGOXIN 125 MCG
125 TABLET ORAL
Qty: 90 TABLET | OUTPATIENT
Start: 2023-09-06

## 2023-09-06 RX ORDER — APIXABAN 5 MG/1
TABLET, FILM COATED ORAL
Qty: 60 TABLET | Refills: 1 | Status: SHIPPED | OUTPATIENT
Start: 2023-09-06

## 2023-09-06 RX ORDER — DIGOXIN 125 MCG
125 TABLET ORAL
Qty: 30 TABLET | Refills: 1 | Status: SHIPPED | OUTPATIENT
Start: 2023-09-06

## 2023-09-06 RX ORDER — PANTOPRAZOLE SODIUM 40 MG/1
40 TABLET, DELAYED RELEASE ORAL
Qty: 90 TABLET | Refills: 0 | Status: CANCELLED | OUTPATIENT
Start: 2023-09-06

## 2023-09-11 ENCOUNTER — OFFICE VISIT (OUTPATIENT)
Dept: NEUROLOGY | Facility: CLINIC | Age: 88
End: 2023-09-11
Payer: MEDICARE

## 2023-09-11 VITALS
DIASTOLIC BLOOD PRESSURE: 78 MMHG | WEIGHT: 163 LBS | BODY MASS INDEX: 25.58 KG/M2 | SYSTOLIC BLOOD PRESSURE: 136 MMHG | HEIGHT: 67 IN | HEART RATE: 76 BPM | OXYGEN SATURATION: 96 %

## 2023-09-11 DIAGNOSIS — I48.91 ATRIAL FIBRILLATION WITH RVR: ICD-10-CM

## 2023-09-11 DIAGNOSIS — I10 ESSENTIAL HYPERTENSION: ICD-10-CM

## 2023-09-11 DIAGNOSIS — E78.5 HYPERLIPIDEMIA, UNSPECIFIED HYPERLIPIDEMIA TYPE: ICD-10-CM

## 2023-09-11 DIAGNOSIS — I67.9 CEREBROVASCULAR DISEASE: Primary | ICD-10-CM

## 2023-09-11 DIAGNOSIS — Z86.73 HISTORY OF STROKE: ICD-10-CM

## 2023-09-11 RX ORDER — LOSARTAN POTASSIUM 50 MG/1
100 TABLET ORAL DAILY
COMMUNITY

## 2023-09-18 RX ORDER — PANTOPRAZOLE SODIUM 40 MG/1
40 TABLET, DELAYED RELEASE ORAL
Qty: 90 TABLET | Refills: 0 | Status: SHIPPED | OUTPATIENT
Start: 2023-09-18

## 2023-10-03 ENCOUNTER — TRANSCRIBE ORDERS (OUTPATIENT)
Dept: ADMINISTRATIVE | Facility: HOSPITAL | Age: 88
End: 2023-10-03
Payer: MEDICARE

## 2023-10-03 DIAGNOSIS — M85.9 DISORDER OF BONE DENSITY AND STRUCTURE, UNSPECIFIED: Primary | ICD-10-CM

## 2023-10-04 ENCOUNTER — OFFICE VISIT (OUTPATIENT)
Dept: CARDIOLOGY | Facility: CLINIC | Age: 88
End: 2023-10-04
Payer: MEDICARE

## 2023-10-04 ENCOUNTER — TELEPHONE (OUTPATIENT)
Dept: CARDIOLOGY | Facility: CLINIC | Age: 88
End: 2023-10-04

## 2023-10-04 VITALS
SYSTOLIC BLOOD PRESSURE: 125 MMHG | OXYGEN SATURATION: 96 % | HEART RATE: 71 BPM | WEIGHT: 164.4 LBS | BODY MASS INDEX: 25.8 KG/M2 | DIASTOLIC BLOOD PRESSURE: 65 MMHG | HEIGHT: 67 IN

## 2023-10-04 DIAGNOSIS — I48.0 PAROXYSMAL ATRIAL FIBRILLATION: Primary | ICD-10-CM

## 2023-10-04 DIAGNOSIS — I63.9 CEREBROVASCULAR ACCIDENT (CVA), UNSPECIFIED MECHANISM: ICD-10-CM

## 2023-10-12 ENCOUNTER — HOSPITAL ENCOUNTER (OUTPATIENT)
Dept: PAIN MEDICINE | Facility: HOSPITAL | Age: 88
Discharge: HOME | End: 2023-10-12
Payer: MEDICARE

## 2023-10-12 ENCOUNTER — HOSPITAL ENCOUNTER (OUTPATIENT)
Dept: GENERAL RADIOLOGY | Facility: HOSPITAL | Age: 88
Discharge: HOME | End: 2023-10-12
Payer: MEDICARE

## 2023-10-12 ENCOUNTER — ANESTHESIA EVENT (OUTPATIENT)
Dept: PAIN MEDICINE | Facility: HOSPITAL | Age: 88
End: 2023-10-12
Payer: MEDICARE

## 2023-10-12 ENCOUNTER — ANESTHESIA (OUTPATIENT)
Dept: PAIN MEDICINE | Facility: HOSPITAL | Age: 88
End: 2023-10-12
Payer: MEDICARE

## 2023-10-12 VITALS
DIASTOLIC BLOOD PRESSURE: 69 MMHG | OXYGEN SATURATION: 97 % | SYSTOLIC BLOOD PRESSURE: 139 MMHG | HEART RATE: 68 BPM | RESPIRATION RATE: 14 BRPM

## 2023-10-12 DIAGNOSIS — R52 PAIN: ICD-10-CM

## 2023-10-12 DIAGNOSIS — M54.16 LUMBAR RADICULOPATHY: ICD-10-CM

## 2023-10-12 PROCEDURE — 77003 FLUOROGUIDE FOR SPINE INJECT: CPT

## 2023-10-12 PROCEDURE — 25510000001 IOPAMIDOL 41 % SOLUTION: Performed by: ANESTHESIOLOGY

## 2023-10-12 PROCEDURE — 25010000002 METHYLPREDNISOLONE PER 80 MG: Performed by: ANESTHESIOLOGY

## 2023-10-12 RX ORDER — FENTANYL CITRATE 50 UG/ML
50 INJECTION, SOLUTION INTRAMUSCULAR; INTRAVENOUS ONCE
Status: DISCONTINUED | OUTPATIENT
Start: 2023-10-12 | End: 2023-10-13 | Stop reason: HOSPADM

## 2023-10-12 RX ORDER — IOPAMIDOL 408 MG/ML
10 INJECTION, SOLUTION INTRATHECAL
Status: COMPLETED | OUTPATIENT
Start: 2023-10-12 | End: 2023-10-12

## 2023-10-12 RX ORDER — LIDOCAINE HYDROCHLORIDE 10 MG/ML
1 INJECTION, SOLUTION INFILTRATION; PERINEURAL ONCE
Status: DISCONTINUED | OUTPATIENT
Start: 2023-10-12 | End: 2023-10-13 | Stop reason: HOSPADM

## 2023-10-12 RX ORDER — MIDAZOLAM HYDROCHLORIDE 1 MG/ML
1 INJECTION INTRAMUSCULAR; INTRAVENOUS ONCE AS NEEDED
Status: DISCONTINUED | OUTPATIENT
Start: 2023-10-12 | End: 2023-10-13 | Stop reason: HOSPADM

## 2023-10-12 RX ORDER — METHYLPREDNISOLONE ACETATE 80 MG/ML
80 INJECTION, SUSPENSION INTRA-ARTICULAR; INTRALESIONAL; INTRAMUSCULAR; SOFT TISSUE ONCE
Status: COMPLETED | OUTPATIENT
Start: 2023-10-12 | End: 2023-10-12

## 2023-10-12 RX ADMIN — METHYLPREDNISOLONE ACETATE 80 MG: 80 INJECTION, SUSPENSION INTRA-ARTICULAR; INTRALESIONAL; INTRAMUSCULAR; SOFT TISSUE at 09:49

## 2023-10-12 RX ADMIN — IOPAMIDOL 10 ML: 408 INJECTION, SOLUTION INTRATHECAL at 09:49

## 2023-10-13 RX ORDER — ZOLEDRONIC ACID 5 MG/100ML
5 INJECTION, SOLUTION INTRAVENOUS ONCE
OUTPATIENT
Start: 2023-10-17

## 2023-10-17 ENCOUNTER — HOSPITAL ENCOUNTER (OUTPATIENT)
Dept: INFUSION THERAPY | Facility: HOSPITAL | Age: 88
Discharge: HOME | End: 2023-10-17
Admitting: INTERNAL MEDICINE
Payer: MEDICARE

## 2023-10-17 VITALS
RESPIRATION RATE: 16 BRPM | BODY MASS INDEX: 25.06 KG/M2 | OXYGEN SATURATION: 98 % | TEMPERATURE: 97.3 F | DIASTOLIC BLOOD PRESSURE: 62 MMHG | HEART RATE: 68 BPM | SYSTOLIC BLOOD PRESSURE: 136 MMHG | WEIGHT: 160 LBS

## 2023-10-17 DIAGNOSIS — M85.89 OSTEOPENIA OF MULTIPLE SITES: Primary | ICD-10-CM

## 2023-10-17 PROCEDURE — 96365 THER/PROPH/DIAG IV INF INIT: CPT

## 2023-10-17 PROCEDURE — 25010000002 ZOLEDRONIC ACID 5 MG/100ML SOLUTION: Performed by: INTERNAL MEDICINE

## 2023-10-17 PROCEDURE — 96374 THER/PROPH/DIAG INJ IV PUSH: CPT

## 2023-10-17 RX ORDER — ZOLEDRONIC ACID 5 MG/100ML
5 INJECTION, SOLUTION INTRAVENOUS ONCE
Status: COMPLETED | OUTPATIENT
Start: 2023-10-17 | End: 2023-10-17

## 2023-10-17 RX ORDER — ZOLEDRONIC ACID 5 MG/100ML
5 INJECTION, SOLUTION INTRAVENOUS ONCE
Status: CANCELLED | OUTPATIENT
Start: 2023-10-17

## 2023-10-17 RX ADMIN — ZOLEDRONIC ACID 5 MG: 0.05 INJECTION, SOLUTION INTRAVENOUS at 10:08

## 2023-10-30 RX ORDER — DIGOXIN 125 MCG
125 TABLET ORAL
Qty: 30 TABLET | Refills: 1 | Status: SHIPPED | OUTPATIENT
Start: 2023-10-30 | End: 2023-10-30

## 2023-10-30 RX ORDER — DIGOXIN 125 MCG
125 TABLET ORAL
Qty: 90 TABLET | Refills: 1 | Status: SHIPPED | OUTPATIENT
Start: 2023-10-30

## 2023-10-31 RX ORDER — APIXABAN 5 MG/1
TABLET, FILM COATED ORAL
Qty: 60 TABLET | Refills: 1 | Status: SHIPPED | OUTPATIENT
Start: 2023-10-31

## 2023-12-28 RX ORDER — APIXABAN 5 MG/1
TABLET, FILM COATED ORAL
Qty: 60 TABLET | Refills: 1 | Status: SHIPPED | OUTPATIENT
Start: 2023-12-28

## 2024-01-18 ENCOUNTER — HOSPITAL ENCOUNTER (OUTPATIENT)
Dept: GENERAL RADIOLOGY | Facility: HOSPITAL | Age: 89
Discharge: HOME | End: 2024-01-18
Payer: MEDICARE

## 2024-01-18 ENCOUNTER — ANESTHESIA (OUTPATIENT)
Dept: PAIN MEDICINE | Facility: HOSPITAL | Age: 89
End: 2024-01-18
Payer: MEDICARE

## 2024-01-18 ENCOUNTER — ANESTHESIA EVENT (OUTPATIENT)
Dept: PAIN MEDICINE | Facility: HOSPITAL | Age: 89
End: 2024-01-18
Payer: MEDICARE

## 2024-01-18 ENCOUNTER — HOSPITAL ENCOUNTER (OUTPATIENT)
Dept: PAIN MEDICINE | Facility: HOSPITAL | Age: 89
Discharge: HOME | End: 2024-01-18
Payer: MEDICARE

## 2024-01-18 VITALS
DIASTOLIC BLOOD PRESSURE: 48 MMHG | RESPIRATION RATE: 16 BRPM | TEMPERATURE: 98 F | OXYGEN SATURATION: 97 % | HEART RATE: 66 BPM | SYSTOLIC BLOOD PRESSURE: 116 MMHG

## 2024-01-18 DIAGNOSIS — M99.53 INTERVERTEBRAL DISC STENOSIS OF NEURAL CANAL OF LUMBAR REGION: Primary | ICD-10-CM

## 2024-01-18 DIAGNOSIS — R52 PAIN: ICD-10-CM

## 2024-01-18 PROCEDURE — 77003 FLUOROGUIDE FOR SPINE INJECT: CPT

## 2024-01-18 PROCEDURE — 25010000002 METHYLPREDNISOLONE PER 80 MG: Performed by: ANESTHESIOLOGY

## 2024-01-18 PROCEDURE — 25510000001 IOPAMIDOL 41 % SOLUTION: Performed by: ANESTHESIOLOGY

## 2024-01-18 RX ORDER — METHYLPREDNISOLONE ACETATE 80 MG/ML
80 INJECTION, SUSPENSION INTRA-ARTICULAR; INTRALESIONAL; INTRAMUSCULAR; SOFT TISSUE ONCE
Status: COMPLETED | OUTPATIENT
Start: 2024-01-18 | End: 2024-01-18

## 2024-01-18 RX ORDER — LIDOCAINE HYDROCHLORIDE 10 MG/ML
1 INJECTION, SOLUTION INFILTRATION; PERINEURAL ONCE
Status: DISCONTINUED | OUTPATIENT
Start: 2024-01-18 | End: 2024-01-19 | Stop reason: HOSPADM

## 2024-01-18 RX ORDER — MIDAZOLAM HYDROCHLORIDE 1 MG/ML
1 INJECTION INTRAMUSCULAR; INTRAVENOUS ONCE AS NEEDED
Status: DISCONTINUED | OUTPATIENT
Start: 2024-01-18 | End: 2024-01-19 | Stop reason: HOSPADM

## 2024-01-18 RX ORDER — IOPAMIDOL 408 MG/ML
10 INJECTION, SOLUTION INTRATHECAL
Status: COMPLETED | OUTPATIENT
Start: 2024-01-18 | End: 2024-01-18

## 2024-01-18 RX ORDER — FENTANYL CITRATE 50 UG/ML
50 INJECTION, SOLUTION INTRAMUSCULAR; INTRAVENOUS ONCE
Status: DISCONTINUED | OUTPATIENT
Start: 2024-01-18 | End: 2024-01-19 | Stop reason: HOSPADM

## 2024-01-18 RX ADMIN — METHYLPREDNISOLONE ACETATE 80 MG: 80 INJECTION, SUSPENSION INTRA-ARTICULAR; INTRALESIONAL; INTRAMUSCULAR; SOFT TISSUE at 09:40

## 2024-01-18 RX ADMIN — IOPAMIDOL 10 ML: 408 INJECTION, SOLUTION INTRATHECAL at 09:40

## 2024-02-27 RX ORDER — APIXABAN 5 MG/1
TABLET, FILM COATED ORAL
Qty: 60 TABLET | Refills: 1 | Status: SHIPPED | OUTPATIENT
Start: 2024-02-27

## 2024-03-08 ENCOUNTER — OFFICE VISIT (OUTPATIENT)
Dept: CARDIOLOGY | Facility: CLINIC | Age: 89
End: 2024-03-08
Payer: MEDICARE

## 2024-03-08 VITALS
WEIGHT: 165.8 LBS | BODY MASS INDEX: 26.02 KG/M2 | DIASTOLIC BLOOD PRESSURE: 68 MMHG | HEIGHT: 67 IN | SYSTOLIC BLOOD PRESSURE: 148 MMHG | HEART RATE: 60 BPM | OXYGEN SATURATION: 97 %

## 2024-03-08 DIAGNOSIS — I10 ESSENTIAL HYPERTENSION: ICD-10-CM

## 2024-03-08 DIAGNOSIS — I48.91 ATRIAL FIBRILLATION WITH RVR: Primary | ICD-10-CM

## 2024-03-08 DIAGNOSIS — Z86.73 HISTORY OF STROKE: ICD-10-CM

## 2024-03-08 DIAGNOSIS — E78.2 MIXED HYPERLIPIDEMIA: ICD-10-CM

## 2024-03-08 PROBLEM — I63.9 STROKE: Status: RESOLVED | Noted: 2023-06-16 | Resolved: 2024-03-08

## 2024-03-08 PROCEDURE — 93000 ELECTROCARDIOGRAM COMPLETE: CPT | Performed by: INTERNAL MEDICINE

## 2024-03-08 PROCEDURE — 99214 OFFICE O/P EST MOD 30 MIN: CPT | Performed by: INTERNAL MEDICINE

## 2024-03-08 PROCEDURE — 1159F MED LIST DOCD IN RCRD: CPT | Performed by: INTERNAL MEDICINE

## 2024-03-08 PROCEDURE — 1160F RVW MEDS BY RX/DR IN RCRD: CPT | Performed by: INTERNAL MEDICINE

## 2024-03-08 RX ORDER — GABAPENTIN 300 MG/1
1 CAPSULE ORAL EVERY 12 HOURS SCHEDULED
COMMUNITY
Start: 2024-02-27

## 2024-04-19 ENCOUNTER — HOSPITAL ENCOUNTER (OUTPATIENT)
Dept: PAIN MEDICINE | Facility: HOSPITAL | Age: 89
Discharge: HOME | End: 2024-04-19
Payer: MEDICARE

## 2024-04-19 RX ORDER — IOPAMIDOL 408 MG/ML
10 INJECTION, SOLUTION INTRATHECAL
Status: DISCONTINUED | OUTPATIENT
Start: 2024-04-19 | End: 2024-04-20 | Stop reason: HOSPADM

## 2024-04-19 RX ORDER — FENTANYL CITRATE 50 UG/ML
50 INJECTION, SOLUTION INTRAMUSCULAR; INTRAVENOUS ONCE
Status: DISCONTINUED | OUTPATIENT
Start: 2024-04-19 | End: 2024-04-20 | Stop reason: HOSPADM

## 2024-04-19 RX ORDER — METHYLPREDNISOLONE ACETATE 80 MG/ML
80 INJECTION, SUSPENSION INTRA-ARTICULAR; INTRALESIONAL; INTRAMUSCULAR; SOFT TISSUE ONCE
Status: DISCONTINUED | OUTPATIENT
Start: 2024-04-19 | End: 2024-04-20 | Stop reason: HOSPADM

## 2024-04-19 RX ORDER — MIDAZOLAM HYDROCHLORIDE 1 MG/ML
1 INJECTION INTRAMUSCULAR; INTRAVENOUS ONCE AS NEEDED
Status: DISCONTINUED | OUTPATIENT
Start: 2024-04-19 | End: 2024-04-20 | Stop reason: HOSPADM

## 2024-04-19 RX ORDER — LIDOCAINE HYDROCHLORIDE 10 MG/ML
1 INJECTION, SOLUTION INFILTRATION; PERINEURAL ONCE
Status: DISCONTINUED | OUTPATIENT
Start: 2024-04-19 | End: 2024-04-20 | Stop reason: HOSPADM

## 2024-04-22 ENCOUNTER — ANESTHESIA (OUTPATIENT)
Dept: PAIN MEDICINE | Facility: HOSPITAL | Age: 89
End: 2024-04-22
Payer: MEDICARE

## 2024-04-22 ENCOUNTER — HOSPITAL ENCOUNTER (OUTPATIENT)
Dept: PAIN MEDICINE | Facility: HOSPITAL | Age: 89
Discharge: HOME | End: 2024-04-22
Payer: MEDICARE

## 2024-04-22 ENCOUNTER — ANESTHESIA EVENT (OUTPATIENT)
Dept: PAIN MEDICINE | Facility: HOSPITAL | Age: 89
End: 2024-04-22
Payer: MEDICARE

## 2024-04-22 ENCOUNTER — HOSPITAL ENCOUNTER (OUTPATIENT)
Dept: GENERAL RADIOLOGY | Facility: HOSPITAL | Age: 89
Discharge: HOME | End: 2024-04-22
Payer: MEDICARE

## 2024-04-22 VITALS
SYSTOLIC BLOOD PRESSURE: 144 MMHG | HEART RATE: 69 BPM | TEMPERATURE: 97.7 F | OXYGEN SATURATION: 98 % | DIASTOLIC BLOOD PRESSURE: 57 MMHG | RESPIRATION RATE: 16 BRPM

## 2024-04-22 DIAGNOSIS — R52 PAIN: ICD-10-CM

## 2024-04-22 DIAGNOSIS — M99.59 INTERVERTEBRAL DISC STENOSIS OF NEURAL CANAL: Primary | ICD-10-CM

## 2024-04-22 PROCEDURE — 25510000001 IOPAMIDOL 41 % SOLUTION: Performed by: ANESTHESIOLOGY

## 2024-04-22 PROCEDURE — 77003 FLUOROGUIDE FOR SPINE INJECT: CPT

## 2024-04-22 PROCEDURE — 25010000002 METHYLPREDNISOLONE PER 80 MG: Performed by: ANESTHESIOLOGY

## 2024-04-22 RX ORDER — LIDOCAINE HYDROCHLORIDE 10 MG/ML
1 INJECTION, SOLUTION INFILTRATION; PERINEURAL ONCE
Status: DISCONTINUED | OUTPATIENT
Start: 2024-04-22 | End: 2024-04-23 | Stop reason: HOSPADM

## 2024-04-22 RX ORDER — MIDAZOLAM HYDROCHLORIDE 1 MG/ML
1 INJECTION INTRAMUSCULAR; INTRAVENOUS ONCE AS NEEDED
Status: DISCONTINUED | OUTPATIENT
Start: 2024-04-22 | End: 2024-04-23 | Stop reason: HOSPADM

## 2024-04-22 RX ORDER — IOPAMIDOL 408 MG/ML
10 INJECTION, SOLUTION INTRATHECAL
Status: COMPLETED | OUTPATIENT
Start: 2024-04-22 | End: 2024-04-22

## 2024-04-22 RX ORDER — METHYLPREDNISOLONE ACETATE 80 MG/ML
80 INJECTION, SUSPENSION INTRA-ARTICULAR; INTRALESIONAL; INTRAMUSCULAR; SOFT TISSUE ONCE
Status: COMPLETED | OUTPATIENT
Start: 2024-04-22 | End: 2024-04-22

## 2024-04-22 RX ORDER — FENTANYL CITRATE 50 UG/ML
50 INJECTION, SOLUTION INTRAMUSCULAR; INTRAVENOUS ONCE
Status: DISCONTINUED | OUTPATIENT
Start: 2024-04-22 | End: 2024-04-23 | Stop reason: HOSPADM

## 2024-04-22 RX ADMIN — METHYLPREDNISOLONE ACETATE 80 MG: 80 INJECTION, SUSPENSION INTRA-ARTICULAR; INTRALESIONAL; INTRAMUSCULAR; SOFT TISSUE at 14:49

## 2024-04-22 RX ADMIN — IOPAMIDOL 10 ML: 408 INJECTION, SOLUTION INTRATHECAL at 14:49

## 2024-04-24 RX ORDER — APIXABAN 5 MG/1
TABLET, FILM COATED ORAL
Qty: 60 TABLET | Refills: 1 | Status: SHIPPED | OUTPATIENT
Start: 2024-04-24

## 2024-06-27 ENCOUNTER — TELEPHONE (OUTPATIENT)
Dept: NEUROLOGY | Facility: CLINIC | Age: 89
End: 2024-06-27
Payer: MEDICARE

## 2024-07-02 ENCOUNTER — OFFICE VISIT (OUTPATIENT)
Dept: NEUROLOGY | Facility: CLINIC | Age: 89
End: 2024-07-02
Payer: MEDICARE

## 2024-07-02 VITALS
HEART RATE: 62 BPM | HEIGHT: 67 IN | DIASTOLIC BLOOD PRESSURE: 72 MMHG | RESPIRATION RATE: 20 BRPM | OXYGEN SATURATION: 97 % | BODY MASS INDEX: 26.21 KG/M2 | WEIGHT: 167 LBS | SYSTOLIC BLOOD PRESSURE: 128 MMHG

## 2024-07-02 DIAGNOSIS — I67.9 CEREBROVASCULAR DISEASE: Primary | ICD-10-CM

## 2024-07-02 DIAGNOSIS — E78.2 MIXED HYPERLIPIDEMIA: ICD-10-CM

## 2024-07-02 DIAGNOSIS — Z86.73 HISTORY OF STROKE: ICD-10-CM

## 2024-07-02 DIAGNOSIS — I10 ESSENTIAL HYPERTENSION: ICD-10-CM

## 2024-07-02 DIAGNOSIS — I48.91 ATRIAL FIBRILLATION WITH RVR: ICD-10-CM

## 2024-07-02 RX ORDER — APIXABAN 5 MG/1
TABLET, FILM COATED ORAL
Qty: 60 TABLET | Refills: 1 | Status: SHIPPED | OUTPATIENT
Start: 2024-07-02

## 2024-07-02 RX ORDER — LATANOPROST 50 UG/ML
SOLUTION/ DROPS OPHTHALMIC
COMMUNITY
Start: 2024-06-12

## 2024-07-23 ENCOUNTER — ANESTHESIA EVENT (OUTPATIENT)
Dept: PAIN MEDICINE | Facility: HOSPITAL | Age: 89
End: 2024-07-23
Payer: MEDICARE

## 2024-07-23 ENCOUNTER — HOSPITAL ENCOUNTER (OUTPATIENT)
Dept: PAIN MEDICINE | Facility: HOSPITAL | Age: 89
Discharge: HOME | End: 2024-07-23
Payer: MEDICARE

## 2024-07-23 ENCOUNTER — HOSPITAL ENCOUNTER (OUTPATIENT)
Dept: GENERAL RADIOLOGY | Facility: HOSPITAL | Age: 89
Discharge: HOME | End: 2024-07-23
Payer: MEDICARE

## 2024-07-23 ENCOUNTER — ANESTHESIA (OUTPATIENT)
Dept: PAIN MEDICINE | Facility: HOSPITAL | Age: 89
End: 2024-07-23
Payer: MEDICARE

## 2024-07-23 VITALS
RESPIRATION RATE: 14 BRPM | TEMPERATURE: 97.1 F | DIASTOLIC BLOOD PRESSURE: 60 MMHG | SYSTOLIC BLOOD PRESSURE: 148 MMHG | HEART RATE: 63 BPM | OXYGEN SATURATION: 96 %

## 2024-07-23 DIAGNOSIS — M99.59 INTERVERTEBRAL DISC STENOSIS OF NEURAL CANAL: ICD-10-CM

## 2024-07-23 DIAGNOSIS — R52 PAIN: ICD-10-CM

## 2024-07-23 PROCEDURE — 25010000002 METHYLPREDNISOLONE PER 80 MG: Performed by: ANESTHESIOLOGY

## 2024-07-23 PROCEDURE — 25510000001 IOPAMIDOL 41 % SOLUTION: Performed by: ANESTHESIOLOGY

## 2024-07-23 PROCEDURE — 77003 FLUOROGUIDE FOR SPINE INJECT: CPT

## 2024-07-23 RX ORDER — METHYLPREDNISOLONE ACETATE 80 MG/ML
80 INJECTION, SUSPENSION INTRA-ARTICULAR; INTRALESIONAL; INTRAMUSCULAR; SOFT TISSUE ONCE
Status: COMPLETED | OUTPATIENT
Start: 2024-07-23 | End: 2024-07-23

## 2024-07-23 RX ORDER — LIDOCAINE HYDROCHLORIDE 10 MG/ML
1 INJECTION, SOLUTION INFILTRATION; PERINEURAL ONCE
Status: DISCONTINUED | OUTPATIENT
Start: 2024-07-23 | End: 2024-07-24 | Stop reason: HOSPADM

## 2024-07-23 RX ORDER — FENTANYL CITRATE 50 UG/ML
50 INJECTION, SOLUTION INTRAMUSCULAR; INTRAVENOUS ONCE
Status: DISCONTINUED | OUTPATIENT
Start: 2024-07-23 | End: 2024-07-24 | Stop reason: HOSPADM

## 2024-07-23 RX ORDER — IOPAMIDOL 408 MG/ML
10 INJECTION, SOLUTION INTRATHECAL
Status: COMPLETED | OUTPATIENT
Start: 2024-07-23 | End: 2024-07-23

## 2024-07-23 RX ORDER — MIDAZOLAM HYDROCHLORIDE 1 MG/ML
1 INJECTION INTRAMUSCULAR; INTRAVENOUS ONCE AS NEEDED
Status: DISCONTINUED | OUTPATIENT
Start: 2024-07-23 | End: 2024-07-24 | Stop reason: HOSPADM

## 2024-07-23 RX ADMIN — IOPAMIDOL 10 ML: 408 INJECTION, SOLUTION INTRATHECAL at 09:44

## 2024-07-23 RX ADMIN — METHYLPREDNISOLONE ACETATE 80 MG: 80 INJECTION, SUSPENSION INTRA-ARTICULAR; INTRALESIONAL; INTRAMUSCULAR; SOFT TISSUE at 09:44

## 2024-07-24 ENCOUNTER — HOSPITAL ENCOUNTER (OUTPATIENT)
Dept: GENERAL RADIOLOGY | Facility: HOSPITAL | Age: 89
Discharge: HOME | End: 2024-07-24
Payer: MEDICARE
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
## 2024-07-24 DIAGNOSIS — R07.81 RIB PAIN ON LEFT SIDE: ICD-10-CM

## 2024-07-24 PROCEDURE — 71100 X-RAY EXAM RIBS UNI 2 VIEWS: CPT

## 2024-07-24 PROCEDURE — 71046 X-RAY EXAM CHEST 2 VIEWS: CPT

## 2024-09-30 RX ORDER — APIXABAN 5 MG/1
TABLET, FILM COATED ORAL
Qty: 180 TABLET | Refills: 3 | Status: SHIPPED | OUTPATIENT
Start: 2024-09-30

## 2024-10-29 ENCOUNTER — ANESTHESIA (OUTPATIENT)
Dept: PAIN MEDICINE | Facility: HOSPITAL | Age: 89
End: 2024-10-29
Payer: MEDICARE

## 2024-10-29 ENCOUNTER — ANESTHESIA EVENT (OUTPATIENT)
Dept: PAIN MEDICINE | Facility: HOSPITAL | Age: 89
End: 2024-10-29
Payer: MEDICARE

## 2024-10-29 ENCOUNTER — HOSPITAL ENCOUNTER (OUTPATIENT)
Dept: GENERAL RADIOLOGY | Facility: HOSPITAL | Age: 89
Discharge: HOME | End: 2024-10-29
Payer: MEDICARE

## 2024-10-29 ENCOUNTER — HOSPITAL ENCOUNTER (OUTPATIENT)
Dept: PAIN MEDICINE | Facility: HOSPITAL | Age: 89
Discharge: HOME | End: 2024-10-29
Payer: MEDICARE

## 2024-10-29 VITALS
SYSTOLIC BLOOD PRESSURE: 125 MMHG | HEART RATE: 64 BPM | TEMPERATURE: 97.1 F | DIASTOLIC BLOOD PRESSURE: 53 MMHG | RESPIRATION RATE: 16 BRPM | OXYGEN SATURATION: 96 %

## 2024-10-29 DIAGNOSIS — M99.59 INTERVERTEBRAL DISC STENOSIS OF NEURAL CANAL: ICD-10-CM

## 2024-10-29 DIAGNOSIS — R52 PAIN: ICD-10-CM

## 2024-10-29 PROCEDURE — 77003 FLUOROGUIDE FOR SPINE INJECT: CPT

## 2024-10-29 PROCEDURE — 25010000002 METHYLPREDNISOLONE PER 80 MG: Performed by: ANESTHESIOLOGY

## 2024-10-29 PROCEDURE — 25510000001 IOPAMIDOL 41 % SOLUTION: Performed by: ANESTHESIOLOGY

## 2024-10-29 RX ORDER — IOPAMIDOL 408 MG/ML
10 INJECTION, SOLUTION INTRATHECAL
Status: COMPLETED | OUTPATIENT
Start: 2024-10-29 | End: 2024-10-29

## 2024-10-29 RX ORDER — LIDOCAINE HYDROCHLORIDE 10 MG/ML
1 INJECTION, SOLUTION INFILTRATION; PERINEURAL ONCE
Status: DISCONTINUED | OUTPATIENT
Start: 2024-10-29 | End: 2024-10-30 | Stop reason: HOSPADM

## 2024-10-29 RX ORDER — METHYLPREDNISOLONE ACETATE 80 MG/ML
80 INJECTION, SUSPENSION INTRA-ARTICULAR; INTRALESIONAL; INTRAMUSCULAR; SOFT TISSUE ONCE
Status: COMPLETED | OUTPATIENT
Start: 2024-10-29 | End: 2024-10-29

## 2024-10-29 RX ORDER — MIDAZOLAM HYDROCHLORIDE 1 MG/ML
1 INJECTION, SOLUTION INTRAMUSCULAR; INTRAVENOUS ONCE AS NEEDED
Status: DISCONTINUED | OUTPATIENT
Start: 2024-10-29 | End: 2024-10-30 | Stop reason: HOSPADM

## 2024-10-29 RX ORDER — FENTANYL CITRATE 50 UG/ML
50 INJECTION, SOLUTION INTRAMUSCULAR; INTRAVENOUS ONCE
Status: DISCONTINUED | OUTPATIENT
Start: 2024-10-29 | End: 2024-10-30 | Stop reason: HOSPADM

## 2024-10-29 RX ADMIN — IOPAMIDOL 10 ML: 408 INJECTION, SOLUTION INTRATHECAL at 09:22

## 2024-10-29 RX ADMIN — METHYLPREDNISOLONE ACETATE 80 MG: 80 INJECTION, SUSPENSION INTRA-ARTICULAR; INTRALESIONAL; INTRAMUSCULAR; SOFT TISSUE at 09:23

## 2024-10-29 NOTE — DISCHARGE INSTRUCTIONS

## 2024-10-29 NOTE — H&P
HISTORY:    The patient returns for another Lumbar epidural steroid injection today.  They have received at least 50% improvement since their last injection with a pain level of 4/10 at its worst recently.    Conservative measures tried in the interim rest    Current Outpatient Medications on File Prior to Encounter   Medication Sig Dispense Refill    apixaban (Eliquis) 5 MG tablet tablet TAKE 1 TABLET BY MOUTH EVERY 12 HOURS FOR ATRIAL FIBRILLATION 180 tablet 3    atorvastatin (LIPITOR) 40 MG tablet Take 1 tablet by mouth Every Night. 30 tablet 1    calcium carbonate (OS-BALWINDER) 600 MG tablet Take 1 tablet by mouth Daily.      carboxymethylcellulose (REFRESH PLUS) 0.5 % solution Administer 1 drop to both eyes 2 (Two) Times a Day As Needed for Dry Eyes.      cholecalciferol (VITAMIN D3) 1000 units tablet Take 1 tablet by mouth Daily.      gabapentin (NEURONTIN) 300 MG capsule Take 1 capsule by mouth Every 12 (Twelve) Hours.      latanoprost (XALATAN) 0.005 % ophthalmic solution USE 1 DROP IN BOTH EYES NIGHTLY      losartan (COZAAR) 50 MG tablet Take 2 tablets by mouth Daily.      pantoprazole (PROTONIX) 40 MG EC tablet Take 1 tablet by mouth Every Morning. 90 tablet 0    timolol (TIMOPTIC) 0.5 % ophthalmic solution Administer 1 drop to both eyes Every Morning.       No current facility-administered medications on file prior to encounter.       Past Medical History:   Diagnosis Date    A-fib     GERD (gastroesophageal reflux disease)     Glaucoma     Hearing loss     Hyperlipidemia     Hypertension     Low back pain     Multiple gastric polyps     Osteopenia     Peripheral neuropathy     Stroke        No hematologic infectious or constitutional symptoms  Negative screen for SERA      Exam:  /55 (BP Location: Left arm, Patient Position: Sitting)   Pulse 68   Temp 36.2 °C (97.1 °F) (Oral)   Resp 16   SpO2 97%   Airway Mallampatti 2  Alert and oriented      Diagnosis:  Post-Op Diagnosis Codes:     * Lumbar  radiculopathy [M54.16]    Plan:  Lumbar 3 epidural steroid injection under fluoroscopic guidance    I have encouraged them to continue:  1.  Physical therapy exercises at home as prescribed by physical therapy or from the pain clinic handout.  Continuation of these exercises every day, or multiple times per week, even when the patient has good pain relief, was stressed to the patient as a preventative measure to decrease the frequency and severity of future pain episodes.  2.  Continue pain medicines as already prescribed.  If patient not currently taking any, it is recommended to begin Acetaminophen 1000 mg po q 8 hours.  If other medicines containing Acetaminophen are currently prescribed, maintain daily dose at 3000mg.    3.  If they can tolerate NSAIDS, it is recommended to take Ibuprofen 600 mg po q 6 hours for 7 days during pain exacerbations.   Alternatively, they may substitute an NSAID of their choice (e.g. Aleve)  4.  Heat and ice to the affected area as tolerated for pain control.   5.  Low impact exercise such as walking or water exercise was recommended to maintain overall health and aid in weight control.   6.  Follow up as needed for subsequent injections.

## 2024-10-29 NOTE — ANESTHESIA PROCEDURE NOTES
PAIN Epidural block      Patient reassessed immediately prior to procedure    Patient location during procedure: pain clinic  Indication:procedure for pain  Performed By  Anesthesiologist: Lazaro Soto MD  Preanesthetic Checklist  Completed: patient identified and risks and benefits discussed  Additional Notes  Diagnosis:     Post-Op Diagnosis Codes:     * Lumbar radiculopathy [M54.16]    Sedation:  none    Sedation time:    A lumbar epidural steroid injection with fluoroscopic guidance and an Isovue dye epidurogram was performed.  Under fluoroscopic guidance, the epidural space was identified and accessed, confirmed by loss of resistance to saline followed by injection of Isovue dye, which shows a good epidurogram.  The above medications were injected uneventfully.    Prep:  Pt Position:prone  Sterile Tech:cap, gloves, mask and sterile barrier  Prep:chlorhexidine gluconate and isopropyl alcohol  Monitoring:blood pressure monitoring, continuous pulse oximetry and EKG  Procedure:Sedation: no     Approach:right paramedian  Guidance: fluoroscopy  Location:lumbar  Level:3-4  Needle Type:Tuohy  Needle Gauge:20  Aspiration:negative  Medications:  Preservative Free Saline:1mL  Isovue:1mL  Comments:Depomedrol 80 mg  Post Assessment:  Pt Tolerance:patient tolerated the procedure well with no apparent complications  Complications:no

## 2024-12-23 ENCOUNTER — APPOINTMENT (OUTPATIENT)
Dept: GENERAL RADIOLOGY | Facility: HOSPITAL | Age: 89
End: 2024-12-23
Payer: MEDICARE

## 2024-12-23 ENCOUNTER — HOSPITAL ENCOUNTER (OUTPATIENT)
Facility: HOSPITAL | Age: 89
Setting detail: OBSERVATION
Discharge: HOME | End: 2024-12-24
Attending: EMERGENCY MEDICINE | Admitting: EMERGENCY MEDICINE
Payer: MEDICARE

## 2024-12-23 ENCOUNTER — APPOINTMENT (OUTPATIENT)
Dept: CARDIOLOGY | Facility: HOSPITAL | Age: 89
End: 2024-12-23
Payer: MEDICARE

## 2024-12-23 DIAGNOSIS — I48.91 ATRIAL FIBRILLATION WITH RAPID VENTRICULAR RESPONSE: Primary | ICD-10-CM

## 2024-12-23 LAB
ALBUMIN SERPL-MCNC: 3.8 G/DL (ref 3.5–5.2)
ALBUMIN/GLOB SERPL: 1.7 G/DL
ALP SERPL-CCNC: 54 U/L (ref 39–117)
ALT SERPL W P-5'-P-CCNC: 14 U/L (ref 1–33)
ANION GAP SERPL CALCULATED.3IONS-SCNC: 10.2 MMOL/L (ref 5–15)
ANION GAP SERPL CALCULATED.3IONS-SCNC: 13.4 MMOL/L (ref 5–15)
AORTIC ARCH: 2 CM
AORTIC DIMENSIONLESS INDEX: 0.6 (DI)
ASCENDING AORTA: 3.1 CM
AST SERPL-CCNC: 19 U/L (ref 1–32)
AV MEAN PRESS GRAD SYS DOP V1V2: 12.2 MMHG
AV VMAX SYS DOP: 227.5 CM/SEC
B PARAPERT DNA SPEC QL NAA+PROBE: NOT DETECTED
B PERT DNA SPEC QL NAA+PROBE: NOT DETECTED
BASOPHILS # BLD AUTO: 0.03 10*3/MM3 (ref 0–0.2)
BASOPHILS NFR BLD AUTO: 0.5 % (ref 0–1.5)
BH CV ECHO MEAS - ACS: 1.25 CM
BH CV ECHO MEAS - AI P1/2T: 605.1 MSEC
BH CV ECHO MEAS - AO MAX PG: 20.7 MMHG
BH CV ECHO MEAS - AO ROOT AREA (BSA CORRECTED): 1.4 CM2
BH CV ECHO MEAS - AO ROOT DIAM: 2.6 CM
BH CV ECHO MEAS - AO V2 VTI: 51.7 CM
BH CV ECHO MEAS - AVA(I,D): 1.76 CM2
BH CV ECHO MEAS - EDV(CUBED): 125 ML
BH CV ECHO MEAS - EDV(MOD-SP2): 87 ML
BH CV ECHO MEAS - EDV(MOD-SP4): 84 ML
BH CV ECHO MEAS - EF(MOD-SP2): 62.1 %
BH CV ECHO MEAS - EF(MOD-SP4): 61.9 %
BH CV ECHO MEAS - ESV(CUBED): 24 ML
BH CV ECHO MEAS - ESV(MOD-SP2): 33 ML
BH CV ECHO MEAS - ESV(MOD-SP4): 32 ML
BH CV ECHO MEAS - FS: 42.3 %
BH CV ECHO MEAS - IVS/LVPW: 1.06 CM
BH CV ECHO MEAS - IVSD: 0.85 CM
BH CV ECHO MEAS - LAT PEAK E' VEL: 5.9 CM/SEC
BH CV ECHO MEAS - LV DIASTOLIC VOL/BSA (35-75): 45.7 CM2
BH CV ECHO MEAS - LV MASS(C)D: 142.2 GRAMS
BH CV ECHO MEAS - LV MAX PG: 6.9 MMHG
BH CV ECHO MEAS - LV MEAN PG: 4.2 MMHG
BH CV ECHO MEAS - LV SYSTOLIC VOL/BSA (12-30): 17.4 CM2
BH CV ECHO MEAS - LV V1 MAX: 131.3 CM/SEC
BH CV ECHO MEAS - LV V1 VTI: 31.5 CM
BH CV ECHO MEAS - LVIDD: 5 CM
BH CV ECHO MEAS - LVIDS: 2.9 CM
BH CV ECHO MEAS - LVOT AREA: 2.9 CM2
BH CV ECHO MEAS - LVOT DIAM: 1.92 CM
BH CV ECHO MEAS - LVPWD: 0.81 CM
BH CV ECHO MEAS - MED PEAK E' VEL: 5.3 CM/SEC
BH CV ECHO MEAS - MV A DUR: 0.13 SEC
BH CV ECHO MEAS - MV A MAX VEL: 113.7 CM/SEC
BH CV ECHO MEAS - MV DEC SLOPE: 250.3 CM/SEC2
BH CV ECHO MEAS - MV DEC TIME: 0.28 SEC
BH CV ECHO MEAS - MV E MAX VEL: 115 CM/SEC
BH CV ECHO MEAS - MV E/A: 1.01
BH CV ECHO MEAS - MV MAX PG: 5.3 MMHG
BH CV ECHO MEAS - MV MEAN PG: 2.8 MMHG
BH CV ECHO MEAS - MV P1/2T: 122.3 MSEC
BH CV ECHO MEAS - MV V2 VTI: 37.9 CM
BH CV ECHO MEAS - MVA(P1/2T): 1.8 CM2
BH CV ECHO MEAS - MVA(VTI): 2.4 CM2
BH CV ECHO MEAS - PA ACC TIME: 0.1 SEC
BH CV ECHO MEAS - PA V2 MAX: 81.4 CM/SEC
BH CV ECHO MEAS - PULM A REVS DUR: 0.15 SEC
BH CV ECHO MEAS - PULM A REVS VEL: 24.7 CM/SEC
BH CV ECHO MEAS - PULM DIAS VEL: 31.5 CM/SEC
BH CV ECHO MEAS - PULM S/D: 1.26
BH CV ECHO MEAS - PULM SYS VEL: 39.8 CM/SEC
BH CV ECHO MEAS - RAP SYSTOLE: 15 MMHG
BH CV ECHO MEAS - RV MAX PG: 0.61 MMHG
BH CV ECHO MEAS - RV V1 MAX: 39 CM/SEC
BH CV ECHO MEAS - RV V1 VTI: 6.4 CM
BH CV ECHO MEAS - RVSP: 38 MMHG
BH CV ECHO MEAS - SV(LVOT): 90.9 ML
BH CV ECHO MEAS - SV(MOD-SP2): 54 ML
BH CV ECHO MEAS - SV(MOD-SP4): 52 ML
BH CV ECHO MEAS - SVI(LVOT): 49.4 ML/M2
BH CV ECHO MEAS - SVI(MOD-SP2): 29.4 ML/M2
BH CV ECHO MEAS - SVI(MOD-SP4): 28.3 ML/M2
BH CV ECHO MEAS - TAPSE (>1.6): 2.22 CM
BH CV ECHO MEAS - TR MAX PG: 23.4 MMHG
BH CV ECHO MEAS - TR MAX VEL: 241.6 CM/SEC
BH CV ECHO MEASUREMENTS AVERAGE E/E' RATIO: 20.54
BH CV XLRA - RV BASE: 3.3 CM
BH CV XLRA - RV LENGTH: 7.8 CM
BH CV XLRA - RV MID: 2.45 CM
BH CV XLRA - TDI S': 13.5 CM/SEC
BILIRUB SERPL-MCNC: 0.3 MG/DL (ref 0–1.2)
BUN SERPL-MCNC: 22 MG/DL (ref 8–23)
BUN SERPL-MCNC: 22 MG/DL (ref 8–23)
BUN/CREAT SERPL: 32.8 (ref 7–25)
BUN/CREAT SERPL: 36.7 (ref 7–25)
C PNEUM DNA NPH QL NAA+NON-PROBE: NOT DETECTED
CALCIUM SPEC-SCNC: 9 MG/DL (ref 8.2–9.6)
CALCIUM SPEC-SCNC: 9.1 MG/DL (ref 8.2–9.6)
CHLORIDE SERPL-SCNC: 102 MMOL/L (ref 98–107)
CHLORIDE SERPL-SCNC: 106 MMOL/L (ref 98–107)
CHOLEST SERPL-MCNC: 200 MG/DL (ref 0–200)
CO2 SERPL-SCNC: 23.6 MMOL/L (ref 22–29)
CO2 SERPL-SCNC: 25.8 MMOL/L (ref 22–29)
CREAT SERPL-MCNC: 0.6 MG/DL (ref 0.57–1)
CREAT SERPL-MCNC: 0.67 MG/DL (ref 0.57–1)
DEPRECATED RDW RBC AUTO: 43.6 FL (ref 37–54)
DEPRECATED RDW RBC AUTO: 45.2 FL (ref 37–54)
EGFRCR SERPLBLD CKD-EPI 2021: 82.6 ML/MIN/1.73
EGFRCR SERPLBLD CKD-EPI 2021: 84.9 ML/MIN/1.73
EOSINOPHIL # BLD AUTO: 0.12 10*3/MM3 (ref 0–0.4)
EOSINOPHIL NFR BLD AUTO: 1.9 % (ref 0.3–6.2)
ERYTHROCYTE [DISTWIDTH] IN BLOOD BY AUTOMATED COUNT: 12.7 % (ref 12.3–15.4)
ERYTHROCYTE [DISTWIDTH] IN BLOOD BY AUTOMATED COUNT: 13 % (ref 12.3–15.4)
FLUAV SUBTYP SPEC NAA+PROBE: NOT DETECTED
FLUBV RNA ISLT QL NAA+PROBE: NOT DETECTED
GEN 5 1HR TROPONIN T REFLEX: 46 NG/L
GLOBULIN UR ELPH-MCNC: 2.3 GM/DL
GLUCOSE SERPL-MCNC: 117 MG/DL (ref 65–99)
GLUCOSE SERPL-MCNC: 131 MG/DL (ref 65–99)
HADV DNA SPEC NAA+PROBE: NOT DETECTED
HBA1C MFR BLD: 5.5 % (ref 4.8–5.6)
HCOV 229E RNA SPEC QL NAA+PROBE: NOT DETECTED
HCOV HKU1 RNA SPEC QL NAA+PROBE: NOT DETECTED
HCOV NL63 RNA SPEC QL NAA+PROBE: NOT DETECTED
HCOV OC43 RNA SPEC QL NAA+PROBE: NOT DETECTED
HCT VFR BLD AUTO: 37.6 % (ref 34–46.6)
HCT VFR BLD AUTO: 38.9 % (ref 34–46.6)
HDLC SERPL-MCNC: 55 MG/DL (ref 40–60)
HGB BLD-MCNC: 12.5 G/DL (ref 12–15.9)
HGB BLD-MCNC: 12.9 G/DL (ref 12–15.9)
HMPV RNA NPH QL NAA+NON-PROBE: NOT DETECTED
HOLD SPECIMEN: NORMAL
HPIV1 RNA ISLT QL NAA+PROBE: NOT DETECTED
HPIV2 RNA SPEC QL NAA+PROBE: NOT DETECTED
HPIV3 RNA NPH QL NAA+PROBE: NOT DETECTED
HPIV4 P GENE NPH QL NAA+PROBE: NOT DETECTED
IMM GRANULOCYTES # BLD AUTO: 0.02 10*3/MM3 (ref 0–0.05)
IMM GRANULOCYTES NFR BLD AUTO: 0.3 % (ref 0–0.5)
LDLC SERPL CALC-MCNC: 134 MG/DL (ref 0–100)
LDLC/HDLC SERPL: 2.43 {RATIO}
LEFT ATRIUM VOLUME INDEX: 36.8 ML/M2
LV EF BIPLANE MOD: 60.4 %
LYMPHOCYTES # BLD AUTO: 1.51 10*3/MM3 (ref 0.7–3.1)
LYMPHOCYTES NFR BLD AUTO: 23.5 % (ref 19.6–45.3)
M PNEUMO IGG SER IA-ACNC: NOT DETECTED
MAGNESIUM SERPL-MCNC: 2 MG/DL (ref 1.7–2.3)
MCH RBC QN AUTO: 31.3 PG (ref 26.6–33)
MCH RBC QN AUTO: 31.5 PG (ref 26.6–33)
MCHC RBC AUTO-ENTMCNC: 33.2 G/DL (ref 31.5–35.7)
MCHC RBC AUTO-ENTMCNC: 33.2 G/DL (ref 31.5–35.7)
MCV RBC AUTO: 94.2 FL (ref 79–97)
MCV RBC AUTO: 95.1 FL (ref 79–97)
MONOCYTES # BLD AUTO: 0.58 10*3/MM3 (ref 0.1–0.9)
MONOCYTES NFR BLD AUTO: 9 % (ref 5–12)
NEUTROPHILS NFR BLD AUTO: 4.16 10*3/MM3 (ref 1.7–7)
NEUTROPHILS NFR BLD AUTO: 64.8 % (ref 42.7–76)
NRBC BLD AUTO-RTO: 0 /100 WBC (ref 0–0.2)
PLATELET # BLD AUTO: 252 10*3/MM3 (ref 140–450)
PLATELET # BLD AUTO: 254 10*3/MM3 (ref 140–450)
PMV BLD AUTO: 10.8 FL (ref 6–12)
PMV BLD AUTO: 10.9 FL (ref 6–12)
POTASSIUM SERPL-SCNC: 4 MMOL/L (ref 3.5–5.2)
POTASSIUM SERPL-SCNC: 4.3 MMOL/L (ref 3.5–5.2)
PROT SERPL-MCNC: 6.1 G/DL (ref 6–8.5)
QT INTERVAL: 311 MS
QT INTERVAL: 418 MS
QT INTERVAL: 424 MS
QTC INTERVAL: 435 MS
QTC INTERVAL: 451 MS
QTC INTERVAL: 501 MS
RBC # BLD AUTO: 3.99 10*6/MM3 (ref 3.77–5.28)
RBC # BLD AUTO: 4.09 10*6/MM3 (ref 3.77–5.28)
RHINOVIRUS RNA SPEC NAA+PROBE: NOT DETECTED
RSV RNA NPH QL NAA+NON-PROBE: NOT DETECTED
SARS-COV-2 RNA NPH QL NAA+NON-PROBE: NOT DETECTED
SINUS: 2.9 CM
SODIUM SERPL-SCNC: 139 MMOL/L (ref 136–145)
SODIUM SERPL-SCNC: 142 MMOL/L (ref 136–145)
STJ: 2.9 CM
TRIGL SERPL-MCNC: 58 MG/DL (ref 0–150)
TROPONIN T % DELTA: 59 %
TROPONIN T NUMERIC DELTA: 17 NG/L
TROPONIN T SERPL HS-MCNC: 29 NG/L
TROPONIN T SERPL HS-MCNC: 46 NG/L
TSH SERPL DL<=0.05 MIU/L-ACNC: 1.98 UIU/ML (ref 0.27–4.2)
VLDLC SERPL-MCNC: 11 MG/DL (ref 5–40)
WBC NRBC COR # BLD AUTO: 6.42 10*3/MM3 (ref 3.4–10.8)
WBC NRBC COR # BLD AUTO: 6.62 10*3/MM3 (ref 3.4–10.8)
WHOLE BLOOD HOLD COAG: NORMAL
WHOLE BLOOD HOLD SPECIMEN: NORMAL

## 2024-12-23 PROCEDURE — 80053 COMPREHEN METABOLIC PANEL: CPT | Performed by: EMERGENCY MEDICINE

## 2024-12-23 PROCEDURE — 25510000001 PERFLUTREN 6.52 MG/ML SUSPENSION 2 ML VIAL: Performed by: INTERNAL MEDICINE

## 2024-12-23 PROCEDURE — 71045 X-RAY EXAM CHEST 1 VIEW: CPT

## 2024-12-23 PROCEDURE — G0378 HOSPITAL OBSERVATION PER HR: HCPCS

## 2024-12-23 PROCEDURE — 36415 COLL VENOUS BLD VENIPUNCTURE: CPT

## 2024-12-23 PROCEDURE — 96374 THER/PROPH/DIAG INJ IV PUSH: CPT

## 2024-12-23 PROCEDURE — 84484 ASSAY OF TROPONIN QUANT: CPT | Performed by: EMERGENCY MEDICINE

## 2024-12-23 PROCEDURE — 84443 ASSAY THYROID STIM HORMONE: CPT | Performed by: HOSPITALIST

## 2024-12-23 PROCEDURE — 93010 ELECTROCARDIOGRAM REPORT: CPT | Performed by: INTERNAL MEDICINE

## 2024-12-23 PROCEDURE — 80061 LIPID PANEL: CPT | Performed by: INTERNAL MEDICINE

## 2024-12-23 PROCEDURE — 93005 ELECTROCARDIOGRAM TRACING: CPT | Performed by: INTERNAL MEDICINE

## 2024-12-23 PROCEDURE — 84484 ASSAY OF TROPONIN QUANT: CPT | Performed by: NURSE PRACTITIONER

## 2024-12-23 PROCEDURE — 99291 CRITICAL CARE FIRST HOUR: CPT

## 2024-12-23 PROCEDURE — 93005 ELECTROCARDIOGRAM TRACING: CPT | Performed by: HOSPITALIST

## 2024-12-23 PROCEDURE — 93306 TTE W/DOPPLER COMPLETE: CPT | Performed by: INTERNAL MEDICINE

## 2024-12-23 PROCEDURE — 93306 TTE W/DOPPLER COMPLETE: CPT

## 2024-12-23 PROCEDURE — 25010000002 DIGOXIN PER 500 MCG: Performed by: EMERGENCY MEDICINE

## 2024-12-23 PROCEDURE — 85025 COMPLETE CBC W/AUTO DIFF WBC: CPT | Performed by: EMERGENCY MEDICINE

## 2024-12-23 PROCEDURE — 85027 COMPLETE CBC AUTOMATED: CPT | Performed by: NURSE PRACTITIONER

## 2024-12-23 PROCEDURE — 99214 OFFICE O/P EST MOD 30 MIN: CPT | Performed by: INTERNAL MEDICINE

## 2024-12-23 PROCEDURE — 0202U NFCT DS 22 TRGT SARS-COV-2: CPT | Performed by: HOSPITALIST

## 2024-12-23 PROCEDURE — 83735 ASSAY OF MAGNESIUM: CPT | Performed by: EMERGENCY MEDICINE

## 2024-12-23 PROCEDURE — 93005 ELECTROCARDIOGRAM TRACING: CPT | Performed by: EMERGENCY MEDICINE

## 2024-12-23 PROCEDURE — 83036 HEMOGLOBIN GLYCOSYLATED A1C: CPT | Performed by: INTERNAL MEDICINE

## 2024-12-23 RX ORDER — BISACODYL 5 MG/1
5 TABLET, DELAYED RELEASE ORAL DAILY PRN
Status: DISCONTINUED | OUTPATIENT
Start: 2024-12-23 | End: 2024-12-24 | Stop reason: HOSPADM

## 2024-12-23 RX ORDER — ACETAMINOPHEN 160 MG/5ML
650 SOLUTION ORAL EVERY 4 HOURS PRN
Status: DISCONTINUED | OUTPATIENT
Start: 2024-12-23 | End: 2024-12-24 | Stop reason: HOSPADM

## 2024-12-23 RX ORDER — AMOXICILLIN 250 MG
2 CAPSULE ORAL 2 TIMES DAILY PRN
Status: DISCONTINUED | OUTPATIENT
Start: 2024-12-23 | End: 2024-12-24 | Stop reason: HOSPADM

## 2024-12-23 RX ORDER — ATORVASTATIN CALCIUM 20 MG/1
40 TABLET, FILM COATED ORAL NIGHTLY
Status: DISCONTINUED | OUTPATIENT
Start: 2024-12-23 | End: 2024-12-24

## 2024-12-23 RX ORDER — SODIUM CHLORIDE 0.9 % (FLUSH) 0.9 %
10 SYRINGE (ML) INJECTION EVERY 12 HOURS SCHEDULED
Status: DISCONTINUED | OUTPATIENT
Start: 2024-12-23 | End: 2024-12-24 | Stop reason: HOSPADM

## 2024-12-23 RX ORDER — SODIUM CHLORIDE 9 MG/ML
40 INJECTION, SOLUTION INTRAVENOUS AS NEEDED
Status: DISCONTINUED | OUTPATIENT
Start: 2024-12-23 | End: 2024-12-24 | Stop reason: HOSPADM

## 2024-12-23 RX ORDER — ACETAMINOPHEN 325 MG/1
650 TABLET ORAL EVERY 4 HOURS PRN
Status: DISCONTINUED | OUTPATIENT
Start: 2024-12-23 | End: 2024-12-24 | Stop reason: HOSPADM

## 2024-12-23 RX ORDER — DIGOXIN 0.25 MG/ML
250 INJECTION INTRAMUSCULAR; INTRAVENOUS ONCE
Status: COMPLETED | OUTPATIENT
Start: 2024-12-23 | End: 2024-12-23

## 2024-12-23 RX ORDER — DIGOXIN 125 MCG
250 TABLET ORAL ONCE
Status: DISCONTINUED | OUTPATIENT
Start: 2024-12-23 | End: 2024-12-23

## 2024-12-23 RX ORDER — DIGOXIN 125 MCG
125 TABLET ORAL
Status: DISCONTINUED | OUTPATIENT
Start: 2024-12-24 | End: 2024-12-24

## 2024-12-23 RX ORDER — SODIUM CHLORIDE 0.9 % (FLUSH) 0.9 %
10 SYRINGE (ML) INJECTION AS NEEDED
Status: DISCONTINUED | OUTPATIENT
Start: 2024-12-23 | End: 2024-12-24 | Stop reason: HOSPADM

## 2024-12-23 RX ORDER — GABAPENTIN 300 MG/1
300 CAPSULE ORAL NIGHTLY
Status: CANCELLED | OUTPATIENT
Start: 2024-12-23

## 2024-12-23 RX ORDER — ONDANSETRON 2 MG/ML
4 INJECTION INTRAMUSCULAR; INTRAVENOUS EVERY 6 HOURS PRN
Status: DISCONTINUED | OUTPATIENT
Start: 2024-12-23 | End: 2024-12-24 | Stop reason: HOSPADM

## 2024-12-23 RX ORDER — NITROGLYCERIN 0.4 MG/1
0.4 TABLET SUBLINGUAL
Status: DISCONTINUED | OUTPATIENT
Start: 2024-12-23 | End: 2024-12-24 | Stop reason: HOSPADM

## 2024-12-23 RX ORDER — POLYETHYLENE GLYCOL 3350 17 G/17G
17 POWDER, FOR SOLUTION ORAL DAILY PRN
Status: DISCONTINUED | OUTPATIENT
Start: 2024-12-23 | End: 2024-12-24 | Stop reason: HOSPADM

## 2024-12-23 RX ORDER — ACETAMINOPHEN 650 MG/1
650 SUPPOSITORY RECTAL EVERY 4 HOURS PRN
Status: DISCONTINUED | OUTPATIENT
Start: 2024-12-23 | End: 2024-12-24 | Stop reason: HOSPADM

## 2024-12-23 RX ORDER — ASPIRIN 325 MG
325 TABLET ORAL ONCE
Status: DISCONTINUED | OUTPATIENT
Start: 2024-12-23 | End: 2024-12-23

## 2024-12-23 RX ORDER — BISACODYL 10 MG
10 SUPPOSITORY, RECTAL RECTAL DAILY PRN
Status: DISCONTINUED | OUTPATIENT
Start: 2024-12-23 | End: 2024-12-24 | Stop reason: HOSPADM

## 2024-12-23 RX ORDER — PANTOPRAZOLE SODIUM 40 MG/1
40 TABLET, DELAYED RELEASE ORAL
Status: DISCONTINUED | OUTPATIENT
Start: 2024-12-24 | End: 2024-12-24 | Stop reason: HOSPADM

## 2024-12-23 RX ORDER — TIMOLOL MALEATE 5 MG/ML
1 SOLUTION/ DROPS OPHTHALMIC EVERY MORNING
Status: DISCONTINUED | OUTPATIENT
Start: 2024-12-23 | End: 2024-12-24 | Stop reason: HOSPADM

## 2024-12-23 RX ORDER — GABAPENTIN 100 MG/1
200 CAPSULE ORAL NIGHTLY
Status: DISCONTINUED | OUTPATIENT
Start: 2024-12-23 | End: 2024-12-24 | Stop reason: HOSPADM

## 2024-12-23 RX ORDER — ONDANSETRON 4 MG/1
4 TABLET, ORALLY DISINTEGRATING ORAL EVERY 6 HOURS PRN
Status: DISCONTINUED | OUTPATIENT
Start: 2024-12-23 | End: 2024-12-24 | Stop reason: HOSPADM

## 2024-12-23 RX ORDER — LATANOPROST 50 UG/ML
1 SOLUTION/ DROPS OPHTHALMIC NIGHTLY
Status: DISCONTINUED | OUTPATIENT
Start: 2024-12-23 | End: 2024-12-24 | Stop reason: HOSPADM

## 2024-12-23 RX ORDER — CALCIUM CARBONATE 500 MG/1
2 TABLET, CHEWABLE ORAL 2 TIMES DAILY PRN
Status: DISCONTINUED | OUTPATIENT
Start: 2024-12-23 | End: 2024-12-24 | Stop reason: HOSPADM

## 2024-12-23 RX ADMIN — DIGOXIN 250 MCG: 0.25 INJECTION INTRAMUSCULAR; INTRAVENOUS at 04:46

## 2024-12-23 RX ADMIN — APIXABAN 5 MG: 5 TABLET, FILM COATED ORAL at 20:14

## 2024-12-23 RX ADMIN — Medication 10 ML: at 10:50

## 2024-12-23 RX ADMIN — PERFLUTREN 3 ML: 6.52 INJECTION, SUSPENSION INTRAVENOUS at 13:48

## 2024-12-23 RX ADMIN — TIMOLOL MALEATE 1 DROP: 5 SOLUTION/ DROPS OPHTHALMIC at 16:25

## 2024-12-23 RX ADMIN — APIXABAN 5 MG: 5 TABLET, FILM COATED ORAL at 10:05

## 2024-12-23 RX ADMIN — ATORVASTATIN CALCIUM 40 MG: 20 TABLET, FILM COATED ORAL at 20:14

## 2024-12-23 RX ADMIN — LATANOPROST 1 DROP: 50 SOLUTION OPHTHALMIC at 20:14

## 2024-12-23 RX ADMIN — Medication 10 ML: at 20:15

## 2024-12-23 RX ADMIN — GABAPENTIN 200 MG: 100 CAPSULE ORAL at 20:14

## 2024-12-23 NOTE — ED NOTES
.Nursing report ED to floor  Isabel Busch  89 y.o.  female    HPI :  HPI  Stated Reason for Visit: Pt presents to ED from home with complaints of chest discomfort that woke her up-pt describes it as a nagging pain but states it is now gone. Pt took 324mg of ASA PTA and is on Eliquis.  History Obtained From: patient, EMS    Chief Complaint  Chief Complaint   Patient presents with    Chest Pain       Admitting doctor:   Saeed Retana MD    Admitting diagnosis:   The encounter diagnosis was Atrial fibrillation with rapid ventricular response.    Code status:   Current Code Status       Date Active Code Status Order ID Comments User Context       12/23/2024 0521 CPR (Attempt to Resuscitate) [id number]  Xenia Monroe APRN ED        Question Answer    Code Status (Patient has no pulse and is not breathing) CPR (Attempt to Resuscitate)    Medical Interventions (Patient has pulse or is breathing) Full Support                    Allergies:   Patient has no known allergies.    Isolation:   No active isolations    Intake and Output  No intake or output data in the 24 hours ending 12/23/24 0947    Weight:       12/23/24  0436   Weight: 72.6 kg (160 lb)       Most recent vitals:   Vitals:    12/23/24 0707 12/23/24 0821 12/23/24 0822 12/23/24 0838   BP:  104/79     BP Location:       Patient Position:       Pulse: (!) 128  114 113   Resp:       Temp:       TempSrc:       SpO2: 94% 92% 92% 94%   Weight:       Height:           Active LDAs/IV Access:   Lines, Drains & Airways       Active LDAs       Name Placement date Placement time Site Days    Peripheral IV 12/23/24 0430 Anterior;Left;Proximal Forearm 12/23/24  0430  Forearm  less than 1                    Labs (abnormal labs have a star):   Labs Reviewed   COMPREHENSIVE METABOLIC PANEL - Abnormal; Notable for the following components:       Result Value    Glucose 131 (*)     BUN/Creatinine Ratio 32.8 (*)     All other components within normal limits     Narrative:     GFR Categories in Chronic Kidney Disease (CKD)      GFR Category          GFR (mL/min/1.73)    Interpretation  G1                     90 or greater         Normal or high (1)  G2                      60-89                Mild decrease (1)  G3a                   45-59                Mild to moderate decrease  G3b                   30-44                Moderate to severe decrease  G4                    15-29                Severe decrease  G5                    14 or less           Kidney failure          (1)In the absence of evidence of kidney disease, neither GFR category G1 or G2 fulfill the criteria for CKD.    eGFR calculation 2021 CKD-EPI creatinine equation, which does not include race as a factor   TROPONIN - Abnormal; Notable for the following components:    HS Troponin T 29 (*)     All other components within normal limits    Narrative:     High Sensitive Troponin T Reference Range:  <14.0 ng/L- Negative Female for AMI  <22.0 ng/L- Negative Male for AMI  >=14 - Abnormal Female indicating possible myocardial injury.  >=22 - Abnormal Male indicating possible myocardial injury.   Clinicians would have to utilize clinical acumen, EKG, Troponin, and serial changes to determine if it is an Acute Myocardial Infarction or myocardial injury due to an underlying chronic condition.        HIGH SENSITIVITIY TROPONIN T 1HR - Abnormal; Notable for the following components:    HS Troponin T 46 (*)     Troponin T Numeric Delta 17 (*)     Troponin T % Delta 59 (*)     All other components within normal limits    Narrative:     High Sensitive Troponin T Reference Range:  <14.0 ng/L- Negative Female for AMI  <22.0 ng/L- Negative Male for AMI  >=14 - Abnormal Female indicating possible myocardial injury.  >=22 - Abnormal Male indicating possible myocardial injury.   Clinicians would have to utilize clinical acumen, EKG, Troponin, and serial changes to determine if it is an Acute Myocardial Infarction or  myocardial injury due to an underlying chronic condition.        BASIC METABOLIC PANEL - Abnormal; Notable for the following components:    Glucose 117 (*)     BUN/Creatinine Ratio 36.7 (*)     All other components within normal limits    Narrative:     GFR Categories in Chronic Kidney Disease (CKD)      GFR Category          GFR (mL/min/1.73)    Interpretation  G1                     90 or greater         Normal or high (1)  G2                      60-89                Mild decrease (1)  G3a                   45-59                Mild to moderate decrease  G3b                   30-44                Moderate to severe decrease  G4                    15-29                Severe decrease  G5                    14 or less           Kidney failure          (1)In the absence of evidence of kidney disease, neither GFR category G1 or G2 fulfill the criteria for CKD.    eGFR calculation 2021 CKD-EPI creatinine equation, which does not include race as a factor   TROPONIN - Abnormal; Notable for the following components:    HS Troponin T 46 (*)     All other components within normal limits    Narrative:     High Sensitive Troponin T Reference Range:  <14.0 ng/L- Negative Female for AMI  <22.0 ng/L- Negative Male for AMI  >=14 - Abnormal Female indicating possible myocardial injury.  >=22 - Abnormal Male indicating possible myocardial injury.   Clinicians would have to utilize clinical acumen, EKG, Troponin, and serial changes to determine if it is an Acute Myocardial Infarction or myocardial injury due to an underlying chronic condition.        LIPID PANEL - Abnormal; Notable for the following components:    LDL Cholesterol  134 (*)     All other components within normal limits    Narrative:     Cholesterol Reference Ranges  (U.S. Department of Health and Human Services ATP III Classifications)    Desirable          <200 mg/dL  Borderline High    200-239 mg/dL  High Risk          >240 mg/dL      Triglyceride Reference  Ranges  (U.S. Department of Health and Human Services ATP III Classifications)    Normal           <150 mg/dL  Borderline High  150-199 mg/dL  High             200-499 mg/dL  Very High        >500 mg/dL    HDL Reference Ranges  (U.S. Department of Health and Human Services ATP III Classifications)    Low     <40 mg/dl (major risk factor for CHD)  High    >60 mg/dl ('negative' risk factor for CHD)        LDL Reference Ranges  (U.S. Department of Health and Human Services ATP III Classifications)    Optimal          <100 mg/dL  Near Optimal     100-129 mg/dL  Borderline High  130-159 mg/dL  High             160-189 mg/dL  Very High        >189 mg/dL   CBC WITH AUTO DIFFERENTIAL - Normal   MAGNESIUM - Normal   CBC (NO DIFF) - Normal   HEMOGLOBIN A1C - Normal    Narrative:     Hemoglobin A1C Ranges:    Increased Risk for Diabetes  5.7% to 6.4%  Diabetes                     >= 6.5%  Diabetic Goal                < 7.0%   RESPIRATORY PANEL PCR W/ COVID-19 (SARS-COV-2), NP SWAB IN UTM/VTP, 2 HR TAT   RAINBOW DRAW    Narrative:     The following orders were created for panel order [place] Draw.  Procedure                               Abnormality         Status                     ---------                               -----------         ------                     Green Top (Gel)[[id number]]                                  Final result               Lavender Top[[id number]]                                     Final result               Gold Top - SST[[id number]]                                                              Light Blue Top[[id number]]                                   Final result                 Please view results for these tests on the individual orders.   TSH   CBC AND DIFFERENTIAL    Narrative:     The following orders were created for panel order CBC & Differential.  Procedure                               Abnormality         Status                     ---------                               -----------          ------                     CBC Auto Differential[[id number]]        Normal              Final result                 Please view results for these tests on the individual orders.   GREEN TOP   LAVENDER TOP   LIGHT BLUE TOP   GOLD TOP - SST       EKG:   ECG 12 Lead ED Triage Standing Order; Chest Pain   Preliminary Result   HEART CLDG=662  bpm   RR Qsxhufrx=823  ms   WY Interval=  ms   P Horizontal Axis=  deg   P Front Axis=  deg   QRSD Ubxsauwk=088  ms   QT Rlcmmaol=424  ms   UYiU=251  ms   QRS Axis=75  deg   T Wave Axis=42  deg   - ABNORMAL ECG -   Wide-QRS tachycardia   Right bundle branch block   Date and Time of Study:2024-12-23 04:33:17          Meds given in ED:   Medications   sodium chloride 0.9 % flush 10 mL (has no administration in time range)   sodium chloride 0.9 % flush 10 mL (has no administration in time range)   sodium chloride 0.9 % flush 10 mL (has no administration in time range)   sodium chloride 0.9 % infusion 40 mL (has no administration in time range)   nitroglycerin (NITROSTAT) SL tablet 0.4 mg (has no administration in time range)   acetaminophen (TYLENOL) tablet 650 mg (has no administration in time range)     Or   acetaminophen (TYLENOL) 160 MG/5ML oral solution 650 mg (has no administration in time range)     Or   acetaminophen (TYLENOL) suppository 650 mg (has no administration in time range)   sennosides-docusate (PERICOLACE) 8.6-50 MG per tablet 2 tablet (has no administration in time range)     And   polyethylene glycol (MIRALAX) packet 17 g (has no administration in time range)     And   bisacodyl (DULCOLAX) EC tablet 5 mg (has no administration in time range)     And   bisacodyl (DULCOLAX) suppository 10 mg (has no administration in time range)   ondansetron ODT (ZOFRAN-ODT) disintegrating tablet 4 mg (has no administration in time range)     Or   ondansetron (ZOFRAN) injection 4 mg (has no administration in time range)   calcium carbonate (TUMS) chewable tablet 500 mg (200 mg  elemental) (has no administration in time range)   digoxin (LANOXIN) tablet 125 mcg (has no administration in time range)   digoxin (LANOXIN) tablet 250 mcg (has no administration in time range)   apixaban (ELIQUIS) tablet 5 mg (has no administration in time range)   gabapentin (NEURONTIN) capsule 200 mg (has no administration in time range)   digoxin (LANOXIN) injection 250 mcg (250 mcg Intravenous Given 24 0446)       Imaging results:  XR Chest 1 View    Result Date: 2024  No acute findings.  This report was finalized on 2024 5:12 AM by Dr. Sherrell Rice M.D on Workstation: [id number]       Ambulatory status:   - standby    Social issues:   Social History     Socioeconomic History    Marital status:    Tobacco Use    Smoking status: Former     Current packs/day: 0.00     Types: Cigarettes     Quit date: 2/3/2024     Years since quittin.8     Passive exposure: Past    Smokeless tobacco: Never   Vaping Use    Vaping status: Never Used   Substance and Sexual Activity    Alcohol use: Not Currently     Alcohol/week: 1.0 standard drink of alcohol     Comment: WITH DINNER    Drug use: No    Sexual activity: Not Currently       Peripheral Neurovascular  Peripheral Neurovascular (Adult)  Peripheral Neurovascular WDL: WDL    Neuro Cognitive  Neuro Cognitive (Adult)  Cognitive/Neuro/Behavioral WDL: WDL    Learning  Learning Assessment  Learning Readiness and Ability: no barriers identified  Education Provided  Person Taught: patient  Teaching Method: verbal instruction  Teaching Focus: symptom/problem overview  Education Outcome Evaluation: eager to learn    Respiratory  Respiratory WDL  Respiratory WDL: WDL  Breath Sounds  All Lung Fields Breath Sounds: All Fields  All Lung Fields Breath Sounds: clear    Abdominal Pain       Pain Assessments  Pain (Adult)  (0-10) Pain Rating: Rest: 0  (0-10) Pain Rating: Activity: 0    Quincy Sanchez RN  24 09:47 EST

## 2024-12-23 NOTE — ED PROVIDER NOTES
EMERGENCY DEPARTMENT ENCOUNTER    Room Number:  15/15  PCP: Uday Omalley MD  Patient Care Team:  Uday Omalley MD as PCP - General (Internal Medicine)   Independent Historians: Patient, EMS    HPI:  Chief Complaint: Palpitations, chest pain    A complete HPI/ROS/PMH/PSH/SH/FH are unobtainable due to: None    Chronic or social conditions impacting patient care (Social Determinants of Health): None  (Financial Resource Strain / Food Insecurity / Transportation Needs / Physical Activity / Stress / Social Connections / Intimate Partner Violence / Housing Stability)    Context: Isabel Busch is a 89 y.o. female who presents to the ED c/o acute chest pain which woke her from sleep patient that she was having palpitations.  Patient has history of paroxysmal A-fib.  Patient required with Eliquis.  Has prior history of stroke.  Patient chest pain resolved after taking aspirin at home.  Patient describes the chest pain as an uncomfortable feeling.  No shortness of breath no nausea vomiting or recent illness.  Patient's blood pressure 90s systolic per EMS heart rate in the 150s to 160s and is irregularly irregular.    Review of prior external notes (non-ED) -and- Review of prior external test results outside of this encounter: I reviewed patient's cardiology note from 3/8/2024 as well as discharge summary from 7/10/2023    Prescription drug monitoring program review:         PAST MEDICAL HISTORY  Active Ambulatory Problems     Diagnosis Date Noted    Closed fracture of radius 05/13/2016    S/P ORIF (open reduction internal fixation) fracture 05/13/2016    History of radius fracture 05/23/2016    History of open reduction and internal fixation (ORIF) procedure 06/20/2016    Osteopenia of multiple sites 08/20/2019    Acute right-sided weakness 06/12/2023    Atrial fibrillation with RVR 07/09/2023    Essential hypertension 07/09/2023    Mixed hyperlipidemia 07/09/2023    GERD without esophagitis  2023    History of stroke 07/10/2023     Resolved Ambulatory Problems     Diagnosis Date Noted    Stroke 2023     Past Medical History:   Diagnosis Date    A-fib     GERD (gastroesophageal reflux disease)     Glaucoma     Hearing loss     Hyperlipidemia     Hypertension     Low back pain     Multiple gastric polyps     Osteopenia     Peripheral neuropathy          PAST SURGICAL HISTORY  Past Surgical History:   Procedure Laterality Date    BREAST BIOPSY      CATARACT EXTRACTION Bilateral     COLONOSCOPY      ENDOSCOPY N/A 2017    Esophagus dilated, Multiple gastric polyps    EPIDURAL BLOCK      ORIF WRIST FRACTURE Left 2016    Procedure: ORIF LEFT RADIUS;  Surgeon: Juan M Herrera MD;  [place] [facility] [place] [facility] [place]  Service:          FAMILY HISTORY  Family History   Problem Relation Age of Onset    Leukemia Mother     Heart attack Father          SOCIAL HISTORY  Social History     Socioeconomic History    Marital status:    Tobacco Use    Smoking status: Former     Current packs/day: 0.00     Types: Cigarettes     Quit date: 2/3/2024     Years since quittin.8     Passive exposure: Past    Smokeless tobacco: Never   Vaping Use    Vaping status: Never Used   Substance and Sexual Activity    Alcohol use: Not Currently     Alcohol/week: 1.0 standard drink of alcohol     Comment: WITH DINNER    Drug use: No    Sexual activity: Not Currently         ALLERGIES  Patient has no known allergies.        REVIEW OF SYSTEMS  Review of Systems  Included in HPI  All systems reviewed and negative except for those discussed in HPI.      PHYSICAL EXAM    I have reviewed the triage vital signs and nursing notes.    ED Triage Vitals   Temp Heart Rate Resp BP SpO2   24 0439 24 0437 24 0436 24 0436 24 0436   98.2 °F (36.8 °C) (!) 152 20 111/78 95 %      Temp src Heart Rate Source Patient Position BP Location FiO2 (%)   24 0439 24 0437 24 0436 24  0436 --   Tympanic Monitor Sitting Right arm        Physical Exam  GENERAL: alert, no acute distress  SKIN: Warm, dry  HENT: Normocephalic, atraumatic  EYES: no scleral icterus  CV: Tachycardic, irregularly irregular  RESPIRATORY: normal effort, lungs clear  ABDOMEN: soft, nontender, nondistended  MUSCULOSKELETAL: no deformity  NEURO: alert, moves all extremities, follows commands                                                               LAB RESULTS  Recent Results (from the past 24 hours)   ECG 12 Lead ED Triage Standing Order; Chest Pain    Collection Time: 12/23/24  4:33 AM   Result Value Ref Range    QT Interval 311 ms    QTC Interval 501 ms   Comprehensive Metabolic Panel    Collection Time: 12/23/24  4:39 AM    Specimen: Blood   Result Value Ref Range    Glucose 131 (H) 65 - 99 mg/dL    BUN 22 8 - 23 mg/dL    Creatinine 0.67 0.57 - 1.00 mg/dL    Sodium 139 136 - 145 mmol/L    Potassium 4.0 3.5 - 5.2 mmol/L    Chloride 102 98 - 107 mmol/L    CO2 23.6 22.0 - 29.0 mmol/L    Calcium 9.0 8.2 - 9.6 mg/dL    Total Protein 6.1 6.0 - 8.5 g/dL    Albumin 3.8 3.5 - 5.2 g/dL    ALT (SGPT) 14 1 - 33 U/L    AST (SGOT) 19 1 - 32 U/L    Alkaline Phosphatase 54 39 - 117 U/L    Total Bilirubin 0.3 0.0 - 1.2 mg/dL    Globulin 2.3 gm/dL    A/G Ratio 1.7 g/dL    BUN/Creatinine Ratio 32.8 (H) 7.0 - 25.0    Anion Gap 13.4 5.0 - 15.0 mmol/L    eGFR 82.6 >60.0 mL/min/1.73   High Sensitivity Troponin T    Collection Time: 12/23/24  4:39 AM    Specimen: Blood   Result Value Ref Range    HS Troponin T 29 (H) <14 ng/L   Green Top (Gel)    Collection Time: 12/23/24  4:39 AM   Result Value Ref Range    Extra Tube Hold for add-ons.    Lavender Top    Collection Time: 12/23/24  4:39 AM   Result Value Ref Range    Extra Tube hold for add-on    Light Blue Top    Collection Time: 12/23/24  4:39 AM   Result Value Ref Range    Extra Tube Hold for add-ons.    CBC Auto Differential    Collection Time: 12/23/24  4:39 AM    Specimen: Blood    Result Value Ref Range    WBC 6.42 3.40 - 10.80 10*3/mm3    RBC 4.09 3.77 - 5.28 10*6/mm3    Hemoglobin 12.9 12.0 - 15.9 g/dL    Hematocrit 38.9 34.0 - 46.6 %    MCV 95.1 79.0 - 97.0 fL    MCH 31.5 26.6 - 33.0 pg    MCHC 33.2 31.5 - 35.7 g/dL    RDW 13.0 12.3 - 15.4 %    RDW-SD 45.2 37.0 - 54.0 fl    MPV 10.9 6.0 - 12.0 fL    Platelets 252 140 - 450 10*3/mm3    Neutrophil % 64.8 42.7 - 76.0 %    Lymphocyte % 23.5 19.6 - 45.3 %    Monocyte % 9.0 5.0 - 12.0 %    Eosinophil % 1.9 0.3 - 6.2 %    Basophil % 0.5 0.0 - 1.5 %    Immature Grans % 0.3 0.0 - 0.5 %    Neutrophils, Absolute 4.16 1.70 - 7.00 10*3/mm3    Lymphocytes, Absolute 1.51 0.70 - 3.10 10*3/mm3    Monocytes, Absolute 0.58 0.10 - 0.90 10*3/mm3    Eosinophils, Absolute 0.12 0.00 - 0.40 10*3/mm3    Basophils, Absolute 0.03 0.00 - 0.20 10*3/mm3    Immature Grans, Absolute 0.02 0.00 - 0.05 10*3/mm3    nRBC 0.0 0.0 - 0.2 /100 WBC   Magnesium    Collection Time: 12/23/24  4:39 AM    Specimen: Blood   Result Value Ref Range    Magnesium 2.0 1.7 - 2.3 mg/dL       I ordered the above labs and independently reviewed the results.        RADIOLOGY  XR Chest 1 View    Result Date: 12/23/2024  SINGLE VIEW OF THE CHEST  HISTORY: Chest pain  COMPARISON: None available.  FINDINGS: Heart size is within normal limits, given portable technique and patient positioning. There is calcification of the aorta. No pneumothorax or pleural effusion is seen. Patient appears to have some areas of scarring within both lungs. No definite acute infiltrates are seen.      No acute findings.  This report was finalized on 12/23/2024 5:12 AM by Dr. Sherrell Rice M.D on Workstation: [id number]       I ordered the above noted radiological studies. Reviewed by me and discussed with radiologist.  See dictation for official radiology interpretation.      PROCEDURES    Procedures      MEDICATIONS GIVEN IN ER    Medications   sodium chloride 0.9 % flush 10 mL (has no administration in time  range)   aspirin tablet 325 mg (325 mg Oral Not Given 12/23/24 0433)   sodium chloride 0.9 % flush 10 mL (has no administration in time range)   sodium chloride 0.9 % flush 10 mL (has no administration in time range)   sodium chloride 0.9 % infusion 40 mL (has no administration in time range)   nitroglycerin (NITROSTAT) SL tablet 0.4 mg (has no administration in time range)   acetaminophen (TYLENOL) tablet 650 mg (has no administration in time range)     Or   acetaminophen (TYLENOL) 160 MG/5ML oral solution 650 mg (has no administration in time range)     Or   acetaminophen (TYLENOL) suppository 650 mg (has no administration in time range)   sennosides-docusate (PERICOLACE) 8.6-50 MG per tablet 2 tablet (has no administration in time range)     And   polyethylene glycol (MIRALAX) packet 17 g (has no administration in time range)     And   bisacodyl (DULCOLAX) EC tablet 5 mg (has no administration in time range)     And   bisacodyl (DULCOLAX) suppository 10 mg (has no administration in time range)   ondansetron ODT (ZOFRAN-ODT) disintegrating tablet 4 mg (has no administration in time range)     Or   ondansetron (ZOFRAN) injection 4 mg (has no administration in time range)   calcium carbonate (TUMS) chewable tablet 500 mg (200 mg elemental) (has no administration in time range)   digoxin (LANOXIN) injection 250 mcg (250 mcg Intravenous Given 12/23/24 0446)         ORDERS PLACED DURING THIS VISIT:  Orders Placed This Encounter   Procedures    XR Chest 1 View    Rand Draw    Comprehensive Metabolic Panel    High Sensitivity Troponin T    CBC Auto Differential    Magnesium    High Sensitivity Troponin T 1Hr    Basic Metabolic Panel    CBC (No Diff)    High Sensitivity Troponin T    Diet: Cardiac; Healthy Heart (2-3 Na+); Fluid Consistency: Thin (IDDSI 0)    Undress & Gown    Continuous Pulse Oximetry    Vital Signs    Intake & Output    Weigh Patient    Oral Care    Place Sequential Compression Device    Maintain  Sequential Compression Device    Maintain IV Access    Telemetry - Place Orders & Notify Provider of Results When Patient Experiences Acute Chest Pain, Dysrhythmia or Respiratory Distress    May Be Off Telemetry for Tests    Code Status and Medical Interventions: CPR (Attempt to Resuscitate); Full Support    L[facility] (on-call MD unless specified) Details    Cardiology (on-call MD unless specified)    Inpatient Cardiology Consult    Oxygen Therapy- Nasal Cannula; Titrate 1-6 LPM Per SpO2; 90 - 95%    ECG 12 Lead ED Triage Standing Order; Chest Pain    ECG 12 Lead ED Triage Standing Order; Chest Pain    Insert Peripheral IV    Insert Peripheral IV    Initiate Observation Status    CBC & Differential    Green Top (Gel)    Lavender Top    Gold Top - SST    Light Blue Top         PROGRESS, DATA ANALYSIS, CONSULTS, AND MEDICAL DECISION MAKING    All labs have been independently interpreted by me.  All radiology studies have been reviewed by me and discussed with radiologist dictating the report.   EKG's independently viewed and interpreted by me.  Discussion below represents my analysis of pertinent findings related to patient's condition, differential diagnosis, treatment plan and final disposition.    Differential diagnosis includes but is not limited to AMI, atrial fibrillation with rapid ventricular response, palpitations, V. tach.    Clinical Scores:              ED Course as of 12/23/24 0550   Mon Dec 23, 2024   0450 WBC: 6.42 [TJ]   0450 Hemoglobin: 12.9 [TJ]   0450 Platelets: 252 [TJ]   0455 EKG          EKG time: 0433  Rhythm/Rate: Atrial fibrillation with rapid ventricular response  P waves and CA: A-fib  QRS, axis: Right bundle branch block  ST and T waves: Nonspecific    Interpreted Contemporaneously by me, independently viewed [TJ]   0516 I have independently reviewed patient's chest x-ray; my interpretation is no infiltrate no pneumothorax [TJ]   0525 With Dr. Marshall.  Agreeable with plan of loading with digoxin.   States that the patient's chest pain returns and pressure is reassuring may consider treating with as needed metoprolol. [TJ]      ED Course User Index  [TJ] Lazaro Rodriguez MD         Critical care provider statement:    Critical care time (minutes): 38.   Critical care time was exclusive of:  Separately billable procedures and treating other patients   Critical care was necessary to treat or prevent imminent or life-threatening deterioration of the following conditions:  Cardiac Failure   Critical care was time spent personally by me on the following activities:  Development of treatment plan with patient or surrogate, discussions with consultants, evaluation of patient's response to treatment, examination of patient, obtaining history from patient or surrogate, ordering and performing treatments and interventions, ordering and review of laboratory studies, ordering and review of radiographic studies, pulse oximetry, re-evaluation of patient's condition and review of old charts.    PPE: The patient wore a mask and I wore an N95 mask throughout the entire patient encounter.       AS OF 05:50 EST VITALS:    BP - 106/97  HR - (!) 126  TEMP - 98.2 °F (36.8 °C) (Tympanic)  O2 SATS - 94%        DIAGNOSIS  Final diagnoses:   Atrial fibrillation with rapid ventricular response         DISPOSITION  ED Disposition       ED Disposition   Decision to Admit    Condition   --    Comment   Level of Care: Telemetry [5]   Diagnosis: Atrial fibrillation with rapid ventricular response [674031]   Admitting Physician: SAGRARIO MAS [[id number]]                    Note Disclaimer: At [facility], we believe that sharing information builds trust and better relationships. You are receiving this note because you recently visited [facility]. It is possible you will see health information before a provider has talked with you about it. This kind of information can be easy to misunderstand. To help you fully understand what it  means for your health, we urge you to discuss this note with your provider.         Lazaro Rodriguez MD  12/23/24 [id number]

## 2024-12-23 NOTE — PLAN OF CARE
Goal Outcome Evaluation:  Plan of Care Reviewed With: patient        Progress: no change  Outcome Evaluation: SR, RA, A/Ox4, assist x1. Admitted r/t Afib with RVR. Echo completed during shift. Plans to d/c home pending medical clearance.

## 2024-12-23 NOTE — PROGRESS NOTES
Clinical Pharmacy Services: Medication History    Isabel Busch is a 89 y.o. female presenting to [facility] for   Chief Complaint   Patient presents with    Chest Pain       She  has a past medical history of A-fib, GERD (gastroesophageal reflux disease), Glaucoma, Hearing loss, Hyperlipidemia, Hypertension, Low back pain, Multiple gastric polyps, Osteopenia, Peripheral neuropathy, and Stroke.    Allergies as of 12/23/2024    (No Known Allergies)       Medication information was obtained from: Pharmacy/Doctor Office   Pharmacy and Phone Number:     Prior to Admission Medications       Prescriptions Last Dose Informant Patient Reported? Taking?    apixaban (Eliquis) 5 MG tablet tablet  Pharmacy No Yes    TAKE 1 TABLET BY MOUTH EVERY 12 HOURS FOR ATRIAL FIBRILLATION    Patient taking differently:  Take 1 tablet by mouth Every 12 (Twelve) Hours.    atorvastatin (LIPITOR) 40 MG tablet  Pharmacy No Yes    Take 1 tablet by mouth Every Night.    calcium carbonate (OS-BALWINDER) 600 MG tablet  Pharmacy Yes Yes    Take 1 tablet by mouth Daily.    cholecalciferol (VITAMIN D3) 1000 units tablet  Pharmacy Yes Yes    Take 1 tablet by mouth Daily.    gabapentin (NEURONTIN) 100 MG capsule  Pharmacy Yes Yes    Take 2 capsules by mouth Every Night.    latanoprost (XALATAN) 0.005 % ophthalmic solution  Pharmacy Yes Yes    Apply 1 drop to eye(s) as directed by provider Every Night.    losartan (COZAAR) 100 MG tablet  Pharmacy Yes Yes    Take 1 tablet by mouth Daily.    pantoprazole (PROTONIX) 40 MG EC tablet  Pharmacy No Yes    Take 1 tablet by mouth Every Morning.    Patient taking differently:  Take 1 tablet by mouth Daily.    timolol (TIMOPTIC) 0.5 % ophthalmic solution  Pharmacy Yes Yes    Administer 1 drop to both eyes Every Morning.              Medication notes:     This medication list is complete to the best of my knowledge as of 12/23/2024    Please call if questions.    Alexia Castillo  Medication History  Technician   [phone]    12/23/2024 09:36 EST

## 2024-12-23 NOTE — H&P
[facility]   HISTORY AND PHYSICAL    Patient Name: Isabel Busch  : 1933  MRN: [id number]  Primary Care Physician:  Uday Omalley MD  Date of admission: 2024    Subjective   Subjective     Chief Complaint: rapid heart rate    History of Present Illness  89-year-old female who comes the hospital because of chest discomfort.  She has a history of atrial fibrillation and prior CVA back in  who remains on Eliquis.  She was admitted our service back in  for atrial fibrillation.  She was started on digoxin at that time.  She now comes the hospital because of chest discomfort or palpitations and her heart rates found to be 150-160.  In the emergency room EKG shows irregular tachycardia likely A-fib    Chest x-ray shows no acute findings        Review of Systems     Personal History     Past Medical History:   Diagnosis Date    A-fib     GERD (gastroesophageal reflux disease)     Glaucoma     Hearing loss     Hyperlipidemia     Hypertension     Low back pain     Multiple gastric polyps     Osteopenia     Peripheral neuropathy     Stroke        Past Surgical History:   Procedure Laterality Date    BREAST BIOPSY      CATARACT EXTRACTION Bilateral     COLONOSCOPY      ENDOSCOPY N/A 2017    Esophagus dilated, Multiple gastric polyps    EPIDURAL BLOCK      ORIF WRIST FRACTURE Left 2016    Procedure: ORIF LEFT RADIUS;  Surgeon: Juan M Herrera MD;  [place] [facility] [place] [facility] [place]  Service:        Family History: family history includes Heart attack in her father; Leukemia in her mother. Otherwise pertinent FHx was reviewed and not pertinent to current issue.    Social History:  reports that she quit smoking about 10 months ago. Her smoking use included cigarettes. She has been exposed to tobacco smoke. She has never used smokeless tobacco. She reports that she does not currently use alcohol after a past usage of about 1.0 standard drink of alcohol per week. She reports that  she does not use drugs.    Home Medications:  apixaban, atorvastatin, calcium carbonate, carboxymethylcellulose, cholecalciferol, gabapentin, latanoprost, losartan, pantoprazole, and timolol    Allergies:  No Known Allergies    Objective    Objective     Vitals:   Temp:  [98.2 °F (36.8 °C)] 98.2 °F (36.8 °C)  Heart Rate:  [113-152] 113  Resp:  [20] 20  BP: (104-119)/(75-97) 104/79    Physical Exam  General Awake alert oriented  Heart tachycardic irregular no murmur   lungs clear to auscultation bilaterally no wheezes rales  Abdomen soft nontender nondistended organomegaly  Remedies no sinus clubbing or edema    Result Review    Result Review:  I have personally reviewed the results from the time of this admission to 12/23/2024 08:56 EST and agree with these findings:  []  Laboratory list / accordion  [x]  Microbiology  [x]  Radiology  [x]  EKG/Telemetry   [x]  Cardiology/Vascular   [x]  Pathology  []  Old records  []  Other:  Most notable findings include: Tachycardia      Assessment & Plan   Assessment / Plan     Brief Patient Summary:  Isabel Busch is a 89 y.o. female who afib with RVR    Active Hospital Problems:  Active Hospital Problems    Diagnosis     **Atrial fibrillation with rapid ventricular response      Plan:   A-fib with rapid ventricular response  -Started on digoxin which has helped  -Cardiology is seeing consider beta-blocker  -Patient has prior stroke and remains on Eliquis  -Repeating echocardiogram    Hypertension  -Hold on home dose of losartan    Hyperlipidemia  -Continue statin therapy consideration for increasing dose per cardiology    History of CVA with mild chronic neurodeficit    Elevated troponin    Glaucoma/GERD/osteoporosis/neuropathy  -Continue with eyedrops    VTE Prophylaxis:  Pharmacologic & mechanical VTE prophylaxis orders are present.        CODE STATUS:    Code Status (Patient has no pulse and is not breathing): CPR (Attempt to Resuscitate)  Medical Interventions (Patient  has pulse or is breathing): Full Support    Admission Status:  I believe this patient meets inpatient status.    Saeed Retana MD

## 2024-12-23 NOTE — CONSULTS
"      [facility]    Patient Name: Isabel Busch  :1933  89 y.o.  LOS: 0  Encounter Provider: Navi Henriquez MD      Patient Care Team:  Uday Omalley MD as PCP - General (Internal Medicine)    Chief Complaint/Consult question: Atrial fibrillation with RVR.    PMH/HPI: This is a 89-year-old female who follows with Dr. Irby.  She was evaluated in the hospital in 2023 after presenting with abrupt onset dizziness and diaphoresis.  She was found to have new onset atrial fibrillation with RVR.  She was initially loaded with digoxin and started on Eliquis 5 mg twice daily.  She subsequently converted back to sinus rhythm and was discharged on digoxin and Eliquis.    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     Interval History: She presented to the hospital this morning complaining of chest pain that woke her up from her sleep.  She also reports having palpitations.  She took an aspirin at home which resolved her pain.  She described it as a \"uncomfortable feeling\".  She denied having shortness of breath nausea vomiting or any other symptoms.  EMS was called and her blood pressure was borderline low and her heart rate was in the 150s to 160s.  Labs largely unremarkable aside from high-sensitivity troponin 29.  Her chest x-ray was negative for any acute findings.  Her EKG did confirm atrial fibrillation with RVR and a right bundle branch block.  She was loaded with digoxin and admitted for further evaluation.     Cardiology has been consulted for atrial fibrillation with RVR.  At the time of interview, patient was in no acute distress and reports overall feeling much better.  She remained in A-fib with -140s.  She lives alone but has a  and also has " "family visiting every weekend.  He is accompanied by her son.  She denies chest pain, dyspnea, palpitation, orthopnea, PND, leg edema, dizziness or syncope.  On Eliquis 5 bid without significant bleeding or bruising.  She had a fall earlier this year in the spring but not since.  She was a social smoker until age 30.  She consumes 1 vodka per night, denies substance.        ECHO 6/13/2023    Left ventricular systolic function is normal. Calculated left ventricular EF = 57.6%    Left ventricular diastolic function is consistent with (grade II w/high LAP) pseudonormalization.    There is calcification and thickening of the aortic valve.  There is no significant aortic valve stenosis or regurgitation present.    There is severe mitral annular calcification extending to leaflets and also calcification of chordae tendon.Mild mitral valve stenosis is present. The mitral valve mean gradient is 4 mmHg.    Saline test results are negative.        Objective   Vital Signs  Temp:  [98.2 °F (36.8 °C)] 98.2 °F (36.8 °C)  Heart Rate:  [113-152] 113  Resp:  [20] 20  BP: (104-119)/(75-97) 104/79  No intake or output data in the 24 hours ending 12/23/24 0854  Flowsheet Rows      Flowsheet Row First Filed Value   Admission Height 170.2 cm (67\") Documented at 12/23/2024 0436   Admission Weight 72.6 kg (160 lb) Documented at 12/23/2024 0436              Vitals and nursing note reviewed.   Constitutional:       Appearance: Not in distress. Frail.   Neck:      Vascular: No JVR.   Pulmonary:      Effort: Pulmonary effort is normal.      Breath sounds: Normal breath sounds. No wheezing. No rhonchi. No rales.   Cardiovascular:      Tachycardia present. Irregular rhythm.      Murmurs: There is no murmur.   Edema:     Peripheral edema absent.   Abdominal:      General: There is no distension.      Palpations: Abdomen is soft.      Tenderness: There is no abdominal tenderness.   Musculoskeletal:      Cervical back: Neck supple. Skin:     " General: Skin is cool.   Neurological:      Mental Status: Alert and oriented to person, place and time.           Pertinent Test Results:  Results from last 7 days   Lab Units 12/23/24  0612 12/23/24  0439   SODIUM mmol/L 142 139   POTASSIUM mmol/L 4.3 4.0   CHLORIDE mmol/L 106 102   CO2 mmol/L 25.8 23.6   BUN mg/dL 22 22   CREATININE mg/dL 0.60 0.67   GLUCOSE mg/dL 117* 131*   CALCIUM mg/dL 9.1 9.0   AST (SGOT) U/L  --  19   ALT (SGPT) U/L  --  14     Results from last 7 days   Lab Units 12/23/24  0612 12/23/24  0439   HSTROP T ng/L 46*  46* 29*     Results from last 7 days   Lab Units 12/23/24  0612 12/23/24  0439   WBC 10*3/mm3 6.62 6.42   HEMOGLOBIN g/dL 12.5 12.9   HEMATOCRIT % 37.6 38.9   PLATELETS 10*3/mm3 254 252         Results from last 7 days   Lab Units 12/23/24 0439   MAGNESIUM mg/dL 2.0     Results from last 7 days   Lab Units 12/23/24 0612   CHOLESTEROL mg/dL 200   TRIGLYCERIDES mg/dL 58   HDL CHOL mg/dL 55                         Medication Review:   apixaban, 5 mg, Oral, Q12H  [START ON 12/24/2024] digoxin, 125 mcg, Oral, Daily  digoxin, 250 mcg, Oral, Once  sodium chloride, 10 mL, Intravenous, Q12H              Assessment & Plan     Active Hospital Problems    Diagnosis  POA    **Atrial fibrillation with rapid ventricular response [I48.91]  Yes      Resolved Hospital Problems   No resolved problems to display.        A-fib with RVR: Known A-fib diagnosed in 2023, presented with malaise in the setting of A-fib with RVR and heart rate in the 180s.  She received digoxin 250 IV overnight with some improvement in rate, will redosed 250 oral x1 today and continue at 125 daily (goal HR <110 in the absence of symptoms).  She is not on a beta-blocker at home, may consider adding depending on rate control.  [id number] score 6, on Eliquis 5 bid and tolerating well, will continue without change.  Last echo was 1 year ago, will repeat while inpatient.  Hypertension: BP a little soft in the setting of A-fib  with RVR, holding home losartan 100 daily.  May resume at lower dose versus start low-dose beta-blocker.  Hyperlipidemia: Lipid panel this admission showed HDL 55, , not well-controlled.  On atorvastatin 40 at home, once verified compliance, may consider increasing dose to 80 daily especially in the setting of known nonobstructive carotid stenosis and prior CVA.  History of CVA: Minimal residual neurodeficit, on Eliquis as above for A-fib.  Troponin elevation: High-sensitivity troponin minimally elevated, likely demand ischemia in the setting of A-fib with RVR, not consistent with ACS.  No additional ischemic evaluation in the setting of very advanced age and absence of anginal symptoms, risk factor modification as above.  GERD  Peripheral neuropathy  Osteopenia/fall risk: Emphasized fall precaution inspection the setting of anticoagulation.    Navi Henriquez MD  [facility]  12/23/24  07:48 EST

## 2024-12-23 NOTE — CASE MANAGEMENT/SOCIAL WORK
Discharge Planning Assessment  [facility]     Patient Name: Isabel Busch  MRN: [id number]  Today's Date: 12/23/2024    Admit Date: 12/23/2024    Plan: Home with caregiver. [employer] transport.   Discharge Needs Assessment       Row Name 12/23/24 1658       Living Environment    People in Home alone  Caregiver in home    Current Living Arrangements home    Potentially Unsafe Housing Conditions none    Primary Care Provided by self    Provides Primary Care For no one    Family Caregiver if Needed none    Quality of Family Relationships helpful;involved;supportive    Able to Return to Prior Arrangements yes       Resource/Environmental Concerns    Resource/Environmental Concerns none    Transportation Concerns none       Transition Planning    Patient/Family Anticipates Transition to home with family    Transportation Anticipated family or friend will provide       Discharge Needs Assessment    Readmission Within the Last 30 Days no previous admission in last 30 days    Equipment Currently Used at Home cane, straight    Anticipated Changes Related to Illness none    Equipment Needed After Discharge none    Provided Post Acute Provider List? N/A    Provided Post Acute Provider Quality & Resource List? N/A                   Discharge Plan       Row Name 12/23/24 3532       Plan    Plan Home with caregiver. [facility] transport.    Plan Comments CCP met with the patient at bedside. Introduced self and explained role of CCP. Patient confirmed the information on her face sheet is accurate. Patients PCP is Uday Ramirez. Patients’ pharmacy is [facility]. Patient lives at home alone. Patient has no HH history. She has been to [facility] in the past. Patient uses a cane at home. Patient has a [id number] that comes every other day 7 days a week. Every other weekend the caregiver is off. Patient plans home with caregiver. Family can transport. CCP following.                  Continued Care and Services -  Admitted Since 12/23/2024    No active coordination exists for this encounter.          Demographic Summary       Row Name 12/23/24 165       General Information    Admission Type observation    Arrived From emergency department    Referral Source admission list    Reason for Consult discharge planning    Preferred Language English                   Functional Status       Row Name 12/23/24 1650       Functional Status    Usual Activity Tolerance good    Current Activity Tolerance moderate       Functional Status, IADL    Medications independent    Meal Preparation assistive person    Housekeeping assistive person    Laundry assistive person    Shopping assistive person       Mental Status    General Appearance WDL WDL       Mental Status Summary    Recent Changes in Mental Status/Cognitive Functioning no changes       Employment/    Employment Status retired                   Psychosocial    No documentation.                  Abuse/Neglect    No documentation.                  Legal    No documentation.                  Substance Abuse    No documentation.                  Patient Forms    No documentation.

## 2024-12-24 VITALS
BODY MASS INDEX: 25.11 KG/M2 | OXYGEN SATURATION: 93 % | DIASTOLIC BLOOD PRESSURE: 55 MMHG | WEIGHT: 160 LBS | HEIGHT: 67 IN | TEMPERATURE: 98.2 F | SYSTOLIC BLOOD PRESSURE: 153 MMHG | RESPIRATION RATE: 18 BRPM | HEART RATE: 57 BPM

## 2024-12-24 LAB
ALBUMIN SERPL-MCNC: 3.6 G/DL (ref 3.5–5.2)
ALBUMIN/GLOB SERPL: 1.5 G/DL
ALP SERPL-CCNC: 54 U/L (ref 39–117)
ALT SERPL W P-5'-P-CCNC: 12 U/L (ref 1–33)
ANION GAP SERPL CALCULATED.3IONS-SCNC: 5.7 MMOL/L (ref 5–15)
AST SERPL-CCNC: 18 U/L (ref 1–32)
BILIRUB SERPL-MCNC: 0.4 MG/DL (ref 0–1.2)
BUN SERPL-MCNC: 13 MG/DL (ref 8–23)
BUN/CREAT SERPL: 22.4 (ref 7–25)
CALCIUM SPEC-SCNC: 9 MG/DL (ref 8.2–9.6)
CHLORIDE SERPL-SCNC: 105 MMOL/L (ref 98–107)
CO2 SERPL-SCNC: 27.3 MMOL/L (ref 22–29)
CREAT SERPL-MCNC: 0.58 MG/DL (ref 0.57–1)
EGFRCR SERPLBLD CKD-EPI 2021: 85.6 ML/MIN/1.73
GLOBULIN UR ELPH-MCNC: 2.4 GM/DL
GLUCOSE SERPL-MCNC: 93 MG/DL (ref 65–99)
POTASSIUM SERPL-SCNC: 4.3 MMOL/L (ref 3.5–5.2)
PROT SERPL-MCNC: 6 G/DL (ref 6–8.5)
QT INTERVAL: 431 MS
QT INTERVAL: 453 MS
QTC INTERVAL: 472 MS
QTC INTERVAL: 482 MS
SODIUM SERPL-SCNC: 138 MMOL/L (ref 136–145)

## 2024-12-24 PROCEDURE — G0378 HOSPITAL OBSERVATION PER HR: HCPCS

## 2024-12-24 PROCEDURE — 93005 ELECTROCARDIOGRAM TRACING: CPT | Performed by: INTERNAL MEDICINE

## 2024-12-24 PROCEDURE — 80053 COMPREHEN METABOLIC PANEL: CPT | Performed by: HOSPITALIST

## 2024-12-24 PROCEDURE — 99214 OFFICE O/P EST MOD 30 MIN: CPT | Performed by: INTERNAL MEDICINE

## 2024-12-24 PROCEDURE — 93010 ELECTROCARDIOGRAM REPORT: CPT | Performed by: INTERNAL MEDICINE

## 2024-12-24 RX ORDER — LOSARTAN POTASSIUM 50 MG/1
50 TABLET ORAL
Qty: 30 TABLET | Refills: 0 | Status: SHIPPED | OUTPATIENT
Start: 2024-12-25

## 2024-12-24 RX ORDER — LOSARTAN POTASSIUM 50 MG/1
50 TABLET ORAL
Status: DISCONTINUED | OUTPATIENT
Start: 2024-12-24 | End: 2024-12-24 | Stop reason: HOSPADM

## 2024-12-24 RX ORDER — ATORVASTATIN CALCIUM 80 MG/1
80 TABLET, FILM COATED ORAL NIGHTLY
Status: DISCONTINUED | OUTPATIENT
Start: 2024-12-24 | End: 2024-12-24 | Stop reason: HOSPADM

## 2024-12-24 RX ORDER — CARBOXYMETHYLCELLULOSE SODIUM 5 MG/ML
1 SOLUTION/ DROPS OPHTHALMIC 2 TIMES DAILY PRN
Start: 2024-12-24

## 2024-12-24 RX ORDER — DIGOXIN 125 MCG
125 TABLET ORAL EVERY OTHER DAY
Status: DISCONTINUED | OUTPATIENT
Start: 2024-12-24 | End: 2024-12-24 | Stop reason: HOSPADM

## 2024-12-24 RX ORDER — DIGOXIN 125 MCG
125 TABLET ORAL EVERY OTHER DAY
Qty: 15 TABLET | Refills: 0 | Status: SHIPPED | OUTPATIENT
Start: 2024-12-26

## 2024-12-24 RX ORDER — ATORVASTATIN CALCIUM 80 MG/1
80 TABLET, FILM COATED ORAL NIGHTLY
Qty: 30 TABLET | Refills: 0 | Status: SHIPPED | OUTPATIENT
Start: 2024-12-24

## 2024-12-24 RX ADMIN — Medication 10 ML: at 08:04

## 2024-12-24 RX ADMIN — DIGOXIN 125 MCG: 125 TABLET ORAL at 10:33

## 2024-12-24 RX ADMIN — PANTOPRAZOLE SODIUM 40 MG: 40 TABLET, DELAYED RELEASE ORAL at 06:30

## 2024-12-24 RX ADMIN — APIXABAN 5 MG: 5 TABLET, FILM COATED ORAL at 08:04

## 2024-12-24 RX ADMIN — TIMOLOL MALEATE 1 DROP: 5 SOLUTION/ DROPS OPHTHALMIC at 08:04

## 2024-12-24 RX ADMIN — LOSARTAN POTASSIUM 50 MG: 50 TABLET, FILM COATED ORAL at 10:33

## 2024-12-24 NOTE — PROGRESS NOTES
"[facility]    Patient Name: Isabel Busch  :1933  89 y.o.  LOS: 0  Encounter Provider: Navi Henriquez MD      Patient Care Team:  Uday Omalley MD as PCP - General (Internal Medicine)    Chief Complaint/Consult question:  Atrial fibrillation with RVR.     PMH/HPI: This is a 89-year-old female who follows with Dr. Irby.  She was evaluated in the hospital in 2023 after presenting with abrupt onset dizziness and diaphoresis.  She was found to have new onset atrial fibrillation with RVR.  She was initially loaded with digoxin and started on Eliquis 5 mg twice daily.  She subsequently converted back to sinus rhythm and was discharged on digoxin and Eliquis.     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      Interval History: She presented to the hospital this morning complaining of chest pain that woke her up from her sleep.  She also reports having palpitations.  She took an aspirin at home which resolved her pain.  She described it as a \"uncomfortable feeling\".  She denied having shortness of breath nausea vomiting or any other symptoms.  EMS was called and her blood pressure was borderline low and her heart rate was in the 150s to 160s.  Labs largely unremarkable aside from high-sensitivity troponin 29.  Her chest x-ray was negative for any acute findings.  Her EKG did confirm atrial fibrillation with RVR and a right bundle branch block.  She was loaded with digoxin and admitted for further evaluation.      Interval History: Converted to normal sinus rhythm yesterday morning and remained so on telemetry.  Repeat echo showed normal LVEF with grade 2 diastolic dysfunction and mild aortic stenosis.  Will dose reduce digoxin to 125 every other day, resume home " "losartan at the daily and likely discharge later today.     Results for orders placed during the hospital encounter of 12/23/24    Adult Transthoracic Echo Complete W/ Cont if Necessary Per Protocol    Interpretation Summary    Left ventricular systolic function is normal. Left ventricular ejection fraction appears to be 61 - 65%.    Left ventricular diastolic function is consistent with (grade II w/high LAP) pseudonormalization.    The left atrial cavity is mildly dilated.    Mild aortic valve stenosis is present. Aortic valve area is 1.8 cm2.    Peak velocity of the flow distal to the aortic valve is 227.5 cm/s. Aortic valve mean pressure gradient is 12 mmHg.    Estimated right ventricular systolic pressure from tricuspid regurgitation is mildly elevated (35-45 mmHg). Calculated right ventricular systolic pressure from tricuspid regurgitation is 38 mmHg.        Objective   Vital Signs  Temp:  [97.5 °F (36.4 °C)-98.1 °F (36.7 °C)] 98.1 °F (36.7 °C)  Heart Rate:  [65-68] 65  Resp:  [18] 18  BP: (114-145)/(58-69) 145/64    Intake/Output Summary (Last 24 hours) at 12/24/2024 0937  Last data filed at 12/23/2024 2017  Gross per 24 hour   Intake 480 ml   Output --   Net 480 ml     Flowsheet Rows      Flowsheet Row First Filed Value   Admission Height 170.2 cm (67\") Documented at 12/23/2024 0436   Admission Weight 72.6 kg (160 lb) Documented at 12/23/2024 0436              Vitals and nursing note reviewed.   Constitutional:       Appearance: Not in distress. Frail.   Neck:      Vascular: No JVR.   Pulmonary:      Effort: Pulmonary effort is normal.      Breath sounds: Normal breath sounds. No wheezing. No rhonchi. No rales.   Cardiovascular:      Bradycardia present. Regular rhythm.      Murmurs: There is no murmur.   Edema:     Peripheral edema absent.   Abdominal:      General: There is no distension.      Palpations: Abdomen is soft.      Tenderness: There is no abdominal tenderness.   Musculoskeletal:      Cervical " back: Neck supple. Skin:     General: Skin is cool.   Neurological:      Mental Status: Alert and oriented to person, place and time.           Pertinent Test Results:  Results from last 7 days   Lab Units 12/23/24  0612 12/23/24  0439   SODIUM mmol/L 142 139   POTASSIUM mmol/L 4.3 4.0   CHLORIDE mmol/L 106 102   CO2 mmol/L 25.8 23.6   BUN mg/dL 22 22   CREATININE mg/dL 0.60 0.67   GLUCOSE mg/dL 117* 131*   CALCIUM mg/dL 9.1 9.0   AST (SGOT) U/L  --  19   ALT (SGPT) U/L  --  14     Results from last 7 days   Lab Units 12/23/24  0612 12/23/24  0439   HSTROP T ng/L 46*  46* 29*     Results from last 7 days   Lab Units 12/23/24  0612 12/23/24  0439   WBC 10*3/mm3 6.62 6.42   HEMOGLOBIN g/dL 12.5 12.9   HEMATOCRIT % 37.6 38.9   PLATELETS 10*3/mm3 254 252         Results from last 7 days   Lab Units 12/23/24  0439   MAGNESIUM mg/dL 2.0     Results from last 7 days   Lab Units 12/23/24  0612   CHOLESTEROL mg/dL 200   TRIGLYCERIDES mg/dL 58   HDL CHOL mg/dL 55         Results from last 7 days   Lab Units 12/23/24  0612   TSH uIU/mL 1.980           Medication Review:   apixaban, 5 mg, Oral, Q12H  atorvastatin, 40 mg, Oral, Nightly  digoxin, 125 mcg, Oral, Daily  gabapentin, 200 mg, Oral, Nightly  latanoprost, 1 drop, Both Eyes, Nightly  pantoprazole, 40 mg, Oral, Q AM  sodium chloride, 10 mL, Intravenous, Q12H  timolol, 1 drop, Both Eyes, QAM              Assessment & Plan     Active Hospital Problems    Diagnosis  POA    **Atrial fibrillation with rapid ventricular response [I48.91]  Yes      Resolved Hospital Problems   No resolved problems to display.        A-fib with RVR: Known A-fib diagnosed in 2023, presented with malaise in the setting of A-fib with RVR and heart rate in the 180s.  She received digoxin 250 IV and converted to normal sinus rhythm, ECG this morning reassuring.  Repeat echo showed LVEF 60-65% with grade 2 diastolic dysfunction and mild AS.  Will discharge from oral 125 mcg every other day (low dose  given baseline bradycardia and frailty).  Defer beta-blocker at this time given concomitant digoxin use.   [id number] score 6, on Eliquis 5 bid and tolerating well, will continue without change.    Hypertension: BP a little soft in the setting of A-fib with RVR, since running elevated after converting to sinus rhythm.  On home losartan 100 daily, will resume at lower dose of 50 daily.  Hyperlipidemia: Lipid panel this admission showed HDL 55, , not well-controlled.  On atorvastatin 40 at home, once verified compliance, will increase dose to 80 daily especially in the setting of known nonobstructive carotid stenosis and prior CVA.  Aortic stenosis: Mild AS on echo as above, will continue with surveillance.  History of CVA: Minimal residual neurodeficit, on Eliquis as above for A-fib.  Troponin elevation: High-sensitivity troponin minimally elevated, likely demand ischemia in the setting of A-fib with RVR, not consistent with ACS.  No additional ischemic evaluation in the setting of very advanced age and absence of anginal symptoms, risk factor modification as above.  GERD  Peripheral neuropathy  Osteopenia/fall risk: Emphasized fall precaution inspection the setting of anticoagulation.    Navi Henriquez MD  [facility]  12/24/24  09:37 EST

## 2024-12-24 NOTE — PLAN OF CARE
Problem: Adult Inpatient Plan of Care  Goal: Plan of Care Review  Outcome: Progressing  Flowsheets (Taken 12/24/2024 0343)  Progress: improving  Outcome Evaluation: No complaints overnight, rested comfortably. VSS. Remains in SR. Plan of care updated. Continue safety measures and progress towards goals.  Plan of Care Reviewed With: patient  Goal: Patient-Specific Goal (Individualized)  Outcome: Progressing  Goal: Absence of Hospital-Acquired Illness or Injury  Outcome: Progressing  Intervention: Identify and Manage Fall Risk  Recent Flowsheet Documentation  Taken 12/24/2024 0215 by Xenia Muniz RN  Safety Promotion/Fall Prevention:   safety round/check completed   activity supervised   assistive device/personal items within reach   fall prevention program maintained   nonskid shoes/slippers when out of bed  Taken 12/24/2024 0030 by Xenia Muniz RN  Safety Promotion/Fall Prevention:   safety round/check completed   activity supervised   assistive device/personal items within reach   fall prevention program maintained   nonskid shoes/slippers when out of bed  Taken 12/23/2024 2200 by Xenia Muniz RN  Safety Promotion/Fall Prevention:   safety round/check completed   activity supervised   assistive device/personal items within reach   fall prevention program maintained   nonskid shoes/slippers when out of bed  Taken 12/23/2024 2017 by Xenia Muniz RN  Safety Promotion/Fall Prevention:   safety round/check completed   activity supervised   assistive device/personal items within reach   fall prevention program maintained   nonskid shoes/slippers when out of bed  Intervention: Prevent Skin Injury  Recent Flowsheet Documentation  Taken 12/23/2024 2017 by Xenia Muniz RN  Body Position: position changed independently  Intervention: Prevent and Manage VTE (Venous Thromboembolism) Risk  Recent Flowsheet Documentation  Taken 12/23/2024 2017 by Xenia Muniz RN  VTE Prevention/Management: (eliquis)  --  Intervention: Prevent Infection  Recent Flowsheet Documentation  Taken 12/24/2024 0215 by Xenia Muniz RN  Infection Prevention:   rest/sleep promoted   personal protective equipment utilized  Taken 12/24/2024 0030 by Xenia Muniz RN  Infection Prevention:   rest/sleep promoted   personal protective equipment utilized  Taken 12/23/2024 2200 by Xenia Muniz RN  Infection Prevention:   rest/sleep promoted   personal protective equipment utilized  Taken 12/23/2024 2017 by Xenia Muniz RN  Infection Prevention:   rest/sleep promoted   personal protective equipment utilized  Goal: Optimal Comfort and Wellbeing  Outcome: Progressing  Intervention: Provide Person-Centered Care  Recent Flowsheet Documentation  Taken 12/24/2024 0030 by Xenia Muniz RN  Trust Relationship/Rapport:   care explained   choices provided   questions answered   reassurance provided   thoughts/feelings acknowledged  Taken 12/23/2024 2017 by Xenia Muniz RN  Trust Relationship/Rapport:   care explained   choices provided   questions answered   reassurance provided   thoughts/feelings acknowledged  Goal: Readiness for Transition of Care  Outcome: Progressing   Goal Outcome Evaluation:  Plan of Care Reviewed With: patient        Progress: improving  Outcome Evaluation: No complaints overnight, rested comfortably. VSS. Remains in SR. Plan of care updated. Continue safety measures and progress towards goals.

## 2024-12-24 NOTE — DISCHARGE SUMMARY
[facility]  [phone]    DISCHARGE SUMMARY  [facility]    Patient Identification:  Name: Isabel Busch  Age: 89 y.o.  Sex: female  :  1933  MRN: [id number]  Primary Care Physician: Uday Omalley MD    Admit date: 2024  Discharge date and time:      Discharge Diagnoses:  Atrial fibrillation with rapid ventricular response       History of present illness from H&P:    89-year-old female who comes the hospital because of chest discomfort.  She has a history of atrial fibrillation and prior CVA back in  who remains on Eliquis.  She was admitted our service back in  for atrial fibrillation.  She was started on digoxin at that time.  She now comes the hospital because of chest discomfort or palpitations and her heart rates found to be 150-160.  In the emergency room EKG shows irregular tachycardia likely A-fib     Hospital Course:     Patient was admitted with A-fib with rapid ventricular response.  She started on digoxin.  She has converted from A-fib back to sinus rhythm.  Patient is feeling well.  No clear indication as to what triggered her A-fib.  She was planning on having a party today of 30 people which could have triggered some emotional distress.    The patient was seen by cardiology and they recommend digoxin 125 every other day, decreasing her losartan to 50 p.o. daily, increasing her Lipitor to 80.  Continuing on Eliquis.    Patient feeling well today anxious for discharge.  Heart rate is normal and in sinus rhythm    The patient was seen and examined on the day of discharge.    Consults:   Consults       Date and Time Order Name Status Description    2024  5:21 AM Inpatient Cardiology Consult Completed     2024  5:13 AM Cardiology (on-call MD unless specified)      2024  4:46 AM [facility] (on-call MD unless specified) Details              Results from last 7 days   Lab Units 24  0612   WBC  "10*3/mm3 6.62   HEMOGLOBIN g/dL 12.5   HEMATOCRIT % 37.6   PLATELETS 10*3/mm3 254       Results from last 7 days   Lab Units 12/24/24  0922   SODIUM mmol/L 138   POTASSIUM mmol/L 4.3   CHLORIDE mmol/L 105   CO2 mmol/L 27.3   BUN mg/dL 13   CREATININE mg/dL 0.58   GLUCOSE mg/dL 93   CALCIUM mg/dL 9.0       Significant Diagnostic Studies:   WBC   Date Value Ref Range Status   12/23/2024 6.62 3.40 - 10.80 10*3/mm3 Final     Hemoglobin   Date Value Ref Range Status   12/23/2024 12.5 12.0 - 15.9 g/dL Final     Hematocrit   Date Value Ref Range Status   12/23/2024 37.6 34.0 - 46.6 % Final     Platelets   Date Value Ref Range Status   12/23/2024 254 140 - 450 10*3/mm3 Final     Sodium   Date Value Ref Range Status   12/24/2024 138 136 - 145 mmol/L Final     Potassium   Date Value Ref Range Status   12/24/2024 4.3 3.5 - 5.2 mmol/L Final     Chloride   Date Value Ref Range Status   12/24/2024 105 98 - 107 mmol/L Final     CO2   Date Value Ref Range Status   12/24/2024 27.3 22.0 - 29.0 mmol/L Final     BUN   Date Value Ref Range Status   12/24/2024 13 8 - 23 mg/dL Final     Creatinine   Date Value Ref Range Status   12/24/2024 0.58 0.57 - 1.00 mg/dL Final     Glucose   Date Value Ref Range Status   12/24/2024 93 65 - 99 mg/dL Final     Calcium   Date Value Ref Range Status   12/24/2024 9.0 8.2 - 9.6 mg/dL Final     Magnesium   Date Value Ref Range Status   12/23/2024 2.0 1.7 - 2.3 mg/dL Final     AST (SGOT)   Date Value Ref Range Status   12/24/2024 18 1 - 32 U/L Final     ALT (SGPT)   Date Value Ref Range Status   12/24/2024 12 1 - 33 U/L Final     Alkaline Phosphatase   Date Value Ref Range Status   12/24/2024 54 39 - 117 U/L Final     No results found for: \"APTT\", \"INR\"  No results found for: \"COLORU\", \"CLARITYU\", \"SPECGRAV\", \"PHUR\", \"PROTEINUR\", \"GLUCOSEU\", \"KETONESU\", \"BLOODU\", \"NITRITE\", \"LEUKOCYTESUR\", \"BILIRUBINUR\", \"UROBILINOGEN\", \"RBCUA\", \"WBCUA\", \"BACTERIA\", \"UACOMMENT\"  HS Troponin T   Date Value Ref Range Status " "  12/23/2024 46 (H) <14 ng/L Final   12/23/2024 46 (H) <14 ng/L Final     No components found for: \"HGBA1C;2\"  No components found for: \"TSH;2\"    Imaging Results (All)       Procedure Component Value Units Date/Time    XR Chest 1 View [[id number]] Collected: 12/23/24 0511     Updated: 12/23/24 0515    Narrative:      SINGLE VIEW OF THE CHEST     HISTORY: Chest pain     COMPARISON: None available.     FINDINGS:  Heart size is within normal limits, given portable technique and patient  positioning. There is calcification of the aorta. No pneumothorax or  pleural effusion is seen. Patient appears to have some areas of scarring  within both lungs. No definite acute infiltrates are seen.       Impression:      No acute findings.     This report was finalized on 12/23/2024 5:12 AM by Dr. Sherrell Rice M.D on Workstation: [id number]           No results found for: \"SITE\", \"ALLENTEST\", \"PHART\", \"MEX7NKM\", \"PO2ART\", \"WSF6JII\", \"BASEEXCESS\", \"N1FONMCC\", \"HGBBG\", \"HCTABG\", \"OXYHEMOGLOBI\", \"METHHGBN\", \"CARBOXYHGB\", \"CO2CT\", \"BAROMETRIC\", \"MODALITY\", \"FIO2\"       Discharge Medications        New Medications        Instructions Start Date   digoxin 125 MCG tablet  Commonly known as: LANOXIN   125 mcg, Oral, Every Other Day   Start Date: December 26, 2024            Changes to Medications        Instructions Start Date   atorvastatin 80 MG tablet  Commonly known as: LIPITOR  What changed:   medication strength  how much to take   80 mg, Oral, Nightly      Eliquis 5 MG tablet tablet  Generic drug: apixaban  What changed: See the new instructions.   TAKE 1 TABLET BY MOUTH EVERY 12 HOURS FOR ATRIAL FIBRILLATION      losartan 50 MG tablet  Commonly known as: COZAAR  What changed:   medication strength  how much to take  when to take this   50 mg, Oral, Every 24 Hours Scheduled   Start Date: December 25, 2024     pantoprazole 40 MG EC tablet  Commonly known as: PROTONIX  What changed: when to take this   40 mg, Oral, Every Early " Morning             Continue These Medications        Instructions Start Date   calcium carbonate 600 MG tablet  Commonly known as: OS-BALWINDER   600 mg, Daily      carboxymethylcellulose 0.5 % solution  Commonly known as: REFRESH PLUS   1 drop, Both Eyes, 2 Times Daily PRN      cholecalciferol 25 MCG (1000 UT) tablet  Commonly known as: VITAMIN D3   1,000 Units, Daily      gabapentin 100 MG capsule  Commonly known as: NEURONTIN   2 capsules, Oral, Nightly      latanoprost 0.005 % ophthalmic solution  Commonly known as: XALATAN   Apply 1 drop to eye(s) as directed by provider Every Night.      timolol 0.5 % ophthalmic solution  Commonly known as: TIMOPTIC   1 drop, Every Morning                 Patient Instructions:       Future Appointments   Date Time Provider Department Center   1/9/2025 11:30 AM Cynthia Bledsoe APRN [facility]   3/10/2025 11:00 AM Cynthia Bledsoe APRN [facility]   7/15/2025  1:00 PM Patricia Yun APRN [facility]         Follow-up Information       Uday Omalley MD .    Specialty: Internal Medicine  Contact information:  [address] [place] [address]  [phone]                             Discharge Order (From admission, onward)       Start     Ordered    12/24/24 1303  Discharge patient  Once        Expected Discharge Date: 12/24/24   Discharge Disposition: Home or Self Care   Physician of Record for Attribution - Please select from Treatment Team: KRYSTYNA MARCUS [[id number]]   Review needed by CMO to determine Physician of Record: No      Question Answer Comment   Physician of Record for Attribution - Please select from Treatment Team KRYSTYNA MARCUS    Review needed by CMO to determine Physician of Record No        12/24/24 1303                    Diet Order   Procedures    Diet: Cardiac; Healthy Heart (2-3 Na+); Fluid Consistency: Thin (IDDSI 0)       TEST  RESULTS PENDING AT DISCHARGE  Pending Labs       Order Current Status    [place] Draw In process               Discharge instructions:  Follow up with your primary care provider in 1 week with a cbc and cmp         Total time spent discharging patient including evaluation, post hospitalization follow up,  medication and post hospitalization instructions and education, total time exceeds 30 minutes.    Signed:  Saeed Retana MD  12/24/2024  13:03 EST

## 2024-12-24 NOTE — CASE MANAGEMENT/SOCIAL WORK
Case Management Discharge Note      Final Note: home no needs    Provided Post Acute Provider List?: N/A  Provided Post Acute Provider Quality & Resource List?: N/A    Selected Continued Care - Discharged on 12/24/2024 Admission date: 12/23/2024 - Discharge disposition: Home or Self Care      Destination    No services have been selected for the patient.                Durable Medical Equipment    No services have been selected for the patient.                Dialysis/Infusion    No services have been selected for the patient.                Home Medical Care    No services have been selected for the patient.                Therapy    No services have been selected for the patient.                Community Resources    No services have been selected for the patient.                Community & DME    No services have been selected for the patient.                    Transportation Services  Private: Car    Final Discharge Disposition Code: 01 - home or self-care

## 2025-01-13 ENCOUNTER — OFFICE VISIT (OUTPATIENT)
Dept: CARDIOLOGY | Facility: CLINIC | Age: OVER 89
End: 2025-01-13
Payer: MEDICARE

## 2025-01-13 VITALS
HEART RATE: 73 BPM | WEIGHT: 171 LBS | SYSTOLIC BLOOD PRESSURE: 140 MMHG | BODY MASS INDEX: 26.78 KG/M2 | DIASTOLIC BLOOD PRESSURE: 70 MMHG | OXYGEN SATURATION: 97 %

## 2025-01-13 DIAGNOSIS — Z86.73 HISTORY OF STROKE: ICD-10-CM

## 2025-01-13 DIAGNOSIS — E78.2 MIXED HYPERLIPIDEMIA: ICD-10-CM

## 2025-01-13 DIAGNOSIS — I48.0 PAROXYSMAL ATRIAL FIBRILLATION: Primary | ICD-10-CM

## 2025-01-13 DIAGNOSIS — I10 ESSENTIAL HYPERTENSION: ICD-10-CM

## 2025-01-13 PROCEDURE — 1160F RVW MEDS BY RX/DR IN RCRD: CPT | Performed by: NURSE PRACTITIONER

## 2025-01-13 PROCEDURE — 1159F MED LIST DOCD IN RCRD: CPT | Performed by: NURSE PRACTITIONER

## 2025-01-13 PROCEDURE — 99214 OFFICE O/P EST MOD 30 MIN: CPT | Performed by: NURSE PRACTITIONER

## 2025-01-13 PROCEDURE — G2211 COMPLEX E/M VISIT ADD ON: HCPCS | Performed by: NURSE PRACTITIONER

## 2025-01-13 RX ORDER — TRIAMCINOLONE ACETONIDE 5 MG/G
1 OINTMENT TOPICAL 2 TIMES DAILY
Qty: 15 G | Refills: 0 | Status: SHIPPED | OUTPATIENT
Start: 2025-01-13

## 2025-01-13 RX ORDER — ATORVASTATIN CALCIUM 80 MG/1
80 TABLET, FILM COATED ORAL NIGHTLY
Qty: 90 TABLET | Refills: 3 | Status: SHIPPED | OUTPATIENT
Start: 2025-01-13

## 2025-01-13 RX ORDER — DIGOXIN 125 MCG
62.5 TABLET ORAL
Qty: 90 TABLET | Refills: 1 | Status: SHIPPED | OUTPATIENT
Start: 2025-01-13

## 2025-01-13 RX ORDER — LOSARTAN POTASSIUM 50 MG/1
50 TABLET ORAL
Qty: 90 TABLET | Refills: 3 | Status: SHIPPED | OUTPATIENT
Start: 2025-01-13

## 2025-01-13 NOTE — PROGRESS NOTES
"Date of Office Visit: 25  Encounter Provider: LEO Geller  Place of Service: [facility] CARDIOLOGY  Patient Name: Isabel Busch  :1933    Chief Complaint   Patient presents with    Follow-up   :     HPI: Isabel Busch is a 89 y.o. female  with hypertension, hyperlipidemia, right bundle branch block, carotid artery disease, paroxysmal atrial fibrillation, GERD, stroke, gastric polyps, glaucoma, osteopenia and hearing loss.  She is a former smoker.     She is followed by Dr. Xenia Irby.  I will visit with her in follow-up and have reviewed her medical record.     In 2023 she had echocardiogram showing low-normal left ventricular systolic function at 50%, grade 2 diastolic dysfunction, mitral annular calcification with mild mitral valve stenosis and negative saline study.  Carotid duplex showed bilateral stenosis less than 50%.     In July she was found to have new onset atrial fibrillation.  She was loaded with digoxin as her blood pressure did not tolerate diltiazem.  Given CHADS2 Vascor of 7 she was anticoagulated with Eliquis and aspirin was discontinued.  She was continued on both Eliquis and clopidogrel at that time.    She presented to the hospital with complaint of chest pain that woke her up from her sleep.  She also had palpitations and a \"uncomfortable feeling\".  Her blood pressure was low and her heart rate was in the 1 50-1 60s.  High-sensitivity troponin was abnormal at 29.  Chest x-ray showed no acute findings.  She was loaded with digoxin and admitted.  She had repeat echocardiogram that showed normal left ventricular systolic function, grade 2 diastolic dysfunction and mild aortic valve stenosis.  Digoxin was decreased to every other day.  Losartan was decreased to 50 mg daily due to low blood pressure.  She ultimately converted back to normal sinus rhythm prior to discharge.    She presents today for reassessment.  She is tolerating the " medication.  No blood in the urine or stool.  No chest pain or shortness of breath or edema or dizziness or palpitation.          No Known Allergies        Family and social history reviewed.     Review of Systems   Skin:  Positive for itching and rash.     All other systems were reviewed and are negative          Objective:     Vitals:    01/13/25 0825   BP: 140/70   BP Location: Right arm   Patient Position: Sitting   Cuff Size: Adult   Pulse: 73   SpO2: 97%   Weight: 77.6 kg (171 lb)     Body mass index is 26.78 kg/m².    PHYSICAL EXAM:  Pulmonary:      Effort: Pulmonary effort is normal.      Breath sounds: Normal breath sounds.   Cardiovascular:      Normal rate. Regular rhythm.      Murmurs: There is a grade 2/6 systolic murmur at the URSB.   Skin:             Procedures      Current Outpatient Medications   Medication Sig Dispense Refill    apixaban (Eliquis) 5 MG tablet tablet Take 1 tablet by mouth Every 12 (Twelve) Hours. 180 tablet 3    atorvastatin (LIPITOR) 80 MG tablet Take 1 tablet by mouth Every Night. 90 tablet 3    calcium carbonate (OS-BALWINDER) 600 MG tablet Take 1 tablet by mouth Daily.      carboxymethylcellulose (REFRESH PLUS) 0.5 % solution Administer 1 drop to both eyes 2 (Two) Times a Day As Needed for Dry Eyes.      cholecalciferol (VITAMIN D3) 1000 units tablet Take 1 tablet by mouth Daily.      digoxin (LANOXIN) 125 MCG tablet Take 0.5 tablets by mouth Daily. 90 tablet 1    gabapentin (NEURONTIN) 100 MG capsule Take 2 capsules by mouth Every Night.      latanoprost (XALATAN) 0.005 % ophthalmic solution Apply 1 drop to eye(s) as directed by provider Every Night.      losartan (COZAAR) 50 MG tablet Take 1 tablet by mouth Daily. 90 tablet 3    pantoprazole (PROTONIX) 40 MG EC tablet Take 1 tablet by mouth Every Morning. (Patient taking differently: Take 1 tablet by mouth Daily.) 90 tablet 0    timolol (TIMOPTIC) 0.5 % ophthalmic solution Administer 1 drop to both eyes Every Morning.       triamcinolone (KENALOG) 0.5 % ointment Apply 1 Application topically to the appropriate area as directed 2 (Two) Times a Day. Apply to affected area am and pm for rash 15 g 0     No current facility-administered medications for this visit.     Assessment:       Diagnosis Plan   1. Paroxysmal atrial fibrillation        2. Essential hypertension        3. Mixed hyperlipidemia        4. History of stroke             No orders of the defined types were placed in this encounter.        Plan:         1.  89-year-old female with paroxysmal atrial fibrillation, diagnosed July 2023.  CHADS2 Vascor of 6 anticoagulated with Eliquis 5 mg twice daily.  She will continue current dose digoxin 62.5 mcg daily along with Eliquis.    2.  Stroke June 2023-she is continued on Eliquis  3.  Bilateral carotid artery stenosis less than 50% on duplex June 2023-she is currently on Plavix and statin therapy. Given her concern for bleeding, would be okay to continue on Eliquis monotherapy if this is okay from a neurology standpoint.  4.  Mild mitral stenosis on echo 2023.  No significant stenosis or regurgitation on last echo December 2024  5.  Right bundle branch block  6.  Hard of hearing  7.  Hypertension-her blood pressure was low during admission in December but seems stable with lower dose losartan 50 mg daily, she will continue current dose  8.  Mild aortic valve stenoses on echo December 2024  9.  Osteopenia  10. Right wrist skin irritation-I will send in triamcinolone cream    I have advised that she keep her appointment in March as scheduled          It has been a pleasure to participate in this patient's care.      Thank you,  LEO Geller      **I used Dragon to dictate this note:**

## 2025-02-21 ENCOUNTER — HOSPITAL ENCOUNTER (OUTPATIENT)
Facility: HOSPITAL | Age: OVER 89
Setting detail: OBSERVATION
Discharge: HOME | End: 2025-02-22
Attending: EMERGENCY MEDICINE | Admitting: INTERNAL MEDICINE
Payer: MEDICARE

## 2025-02-21 ENCOUNTER — APPOINTMENT (OUTPATIENT)
Dept: GENERAL RADIOLOGY | Facility: HOSPITAL | Age: OVER 89
End: 2025-02-21
Payer: MEDICARE

## 2025-02-21 DIAGNOSIS — I48.91 ATRIAL FIBRILLATION WITH RVR: Primary | ICD-10-CM

## 2025-02-21 DIAGNOSIS — R79.89 ELEVATED TROPONIN: ICD-10-CM

## 2025-02-21 LAB
ALBUMIN SERPL-MCNC: 3.6 G/DL (ref 3.5–5.2)
ALBUMIN/GLOB SERPL: 1.7 G/DL
ALP SERPL-CCNC: 71 U/L (ref 39–117)
ALT SERPL W P-5'-P-CCNC: 11 U/L (ref 1–33)
ANION GAP SERPL CALCULATED.3IONS-SCNC: 11 MMOL/L (ref 5–15)
APTT PPP: 29.7 SECONDS (ref 22.7–35.4)
AST SERPL-CCNC: 18 U/L (ref 1–32)
BASOPHILS # BLD AUTO: 0.03 10*3/MM3 (ref 0–0.2)
BASOPHILS NFR BLD AUTO: 0.5 % (ref 0–1.5)
BILIRUB SERPL-MCNC: 0.2 MG/DL (ref 0–1.2)
BUN SERPL-MCNC: 18 MG/DL (ref 8–23)
BUN/CREAT SERPL: 34 (ref 7–25)
CALCIUM SPEC-SCNC: 8.5 MG/DL (ref 8.2–9.6)
CHLORIDE SERPL-SCNC: 104 MMOL/L (ref 98–107)
CO2 SERPL-SCNC: 23 MMOL/L (ref 22–29)
CREAT SERPL-MCNC: 0.53 MG/DL (ref 0.57–1)
DEPRECATED RDW RBC AUTO: 43.5 FL (ref 37–54)
DIGOXIN SERPL-MCNC: 0.4 NG/ML (ref 0.6–1.2)
EGFRCR SERPLBLD CKD-EPI 2021: 87.4 ML/MIN/1.73
EOSINOPHIL # BLD AUTO: 0.14 10*3/MM3 (ref 0–0.4)
EOSINOPHIL NFR BLD AUTO: 2.2 % (ref 0.3–6.2)
ERYTHROCYTE [DISTWIDTH] IN BLOOD BY AUTOMATED COUNT: 12.6 % (ref 12.3–15.4)
GEN 5 1HR TROPONIN T REFLEX: 27 NG/L
GLOBULIN UR ELPH-MCNC: 2.1 GM/DL
GLUCOSE SERPL-MCNC: 132 MG/DL (ref 65–99)
HCT VFR BLD AUTO: 36.9 % (ref 34–46.6)
HGB BLD-MCNC: 12.3 G/DL (ref 12–15.9)
IMM GRANULOCYTES # BLD AUTO: 0.02 10*3/MM3 (ref 0–0.05)
IMM GRANULOCYTES NFR BLD AUTO: 0.3 % (ref 0–0.5)
INR PPP: 1.29 (ref 0.9–1.1)
LYMPHOCYTES # BLD AUTO: 0.98 10*3/MM3 (ref 0.7–3.1)
LYMPHOCYTES NFR BLD AUTO: 15.6 % (ref 19.6–45.3)
MAGNESIUM SERPL-MCNC: 1.9 MG/DL (ref 1.7–2.3)
MCH RBC QN AUTO: 31.3 PG (ref 26.6–33)
MCHC RBC AUTO-ENTMCNC: 33.3 G/DL (ref 31.5–35.7)
MCV RBC AUTO: 93.9 FL (ref 79–97)
MONOCYTES # BLD AUTO: 0.66 10*3/MM3 (ref 0.1–0.9)
MONOCYTES NFR BLD AUTO: 10.5 % (ref 5–12)
NEUTROPHILS NFR BLD AUTO: 4.46 10*3/MM3 (ref 1.7–7)
NEUTROPHILS NFR BLD AUTO: 70.9 % (ref 42.7–76)
NRBC BLD AUTO-RTO: 0 /100 WBC (ref 0–0.2)
NT-PROBNP SERPL-MCNC: 325 PG/ML (ref 0–1800)
PHOSPHATE SERPL-MCNC: 2.9 MG/DL (ref 2.5–4.5)
PLATELET # BLD AUTO: 217 10*3/MM3 (ref 140–450)
PMV BLD AUTO: 10.8 FL (ref 6–12)
POTASSIUM SERPL-SCNC: 3.6 MMOL/L (ref 3.5–5.2)
POTASSIUM SERPL-SCNC: 4.7 MMOL/L (ref 3.5–5.2)
PROT SERPL-MCNC: 5.7 G/DL (ref 6–8.5)
PROTHROMBIN TIME: 16 SECONDS (ref 11.7–14.2)
QT INTERVAL: 334 MS
QT INTERVAL: 395 MS
QT INTERVAL: 432 MS
QTC INTERVAL: 429 MS
QTC INTERVAL: 480 MS
QTC INTERVAL: 487 MS
RBC # BLD AUTO: 3.93 10*6/MM3 (ref 3.77–5.28)
SODIUM SERPL-SCNC: 138 MMOL/L (ref 136–145)
TROPONIN T % DELTA: 17
TROPONIN T NUMERIC DELTA: 4 NG/L
TROPONIN T SERPL HS-MCNC: 23 NG/L
WBC NRBC COR # BLD AUTO: 6.29 10*3/MM3 (ref 3.4–10.8)

## 2025-02-21 PROCEDURE — 85730 THROMBOPLASTIN TIME PARTIAL: CPT | Performed by: EMERGENCY MEDICINE

## 2025-02-21 PROCEDURE — 71045 X-RAY EXAM CHEST 1 VIEW: CPT

## 2025-02-21 PROCEDURE — 96365 THER/PROPH/DIAG IV INF INIT: CPT

## 2025-02-21 PROCEDURE — G0378 HOSPITAL OBSERVATION PER HR: HCPCS

## 2025-02-21 PROCEDURE — 36415 COLL VENOUS BLD VENIPUNCTURE: CPT

## 2025-02-21 PROCEDURE — 93010 ELECTROCARDIOGRAM REPORT: CPT | Performed by: INTERNAL MEDICINE

## 2025-02-21 PROCEDURE — 25010000002 MAGNESIUM SULFATE 2 GM/50ML SOLUTION: Performed by: HOSPITALIST

## 2025-02-21 PROCEDURE — 84484 ASSAY OF TROPONIN QUANT: CPT | Performed by: EMERGENCY MEDICINE

## 2025-02-21 PROCEDURE — 83880 ASSAY OF NATRIURETIC PEPTIDE: CPT | Performed by: EMERGENCY MEDICINE

## 2025-02-21 PROCEDURE — 93005 ELECTROCARDIOGRAM TRACING: CPT | Performed by: INTERNAL MEDICINE

## 2025-02-21 PROCEDURE — 84132 ASSAY OF SERUM POTASSIUM: CPT | Performed by: INTERNAL MEDICINE

## 2025-02-21 PROCEDURE — 80053 COMPREHEN METABOLIC PANEL: CPT | Performed by: EMERGENCY MEDICINE

## 2025-02-21 PROCEDURE — 83735 ASSAY OF MAGNESIUM: CPT | Performed by: EMERGENCY MEDICINE

## 2025-02-21 PROCEDURE — 80162 ASSAY OF DIGOXIN TOTAL: CPT | Performed by: EMERGENCY MEDICINE

## 2025-02-21 PROCEDURE — 85025 COMPLETE CBC W/AUTO DIFF WBC: CPT | Performed by: EMERGENCY MEDICINE

## 2025-02-21 PROCEDURE — 96366 THER/PROPH/DIAG IV INF ADDON: CPT

## 2025-02-21 PROCEDURE — 93005 ELECTROCARDIOGRAM TRACING: CPT | Performed by: EMERGENCY MEDICINE

## 2025-02-21 PROCEDURE — 85610 PROTHROMBIN TIME: CPT | Performed by: EMERGENCY MEDICINE

## 2025-02-21 PROCEDURE — 93005 ELECTROCARDIOGRAM TRACING: CPT | Performed by: NURSE PRACTITIONER

## 2025-02-21 PROCEDURE — 25010000002 MAGNESIUM SULFATE 2 GM/50ML SOLUTION: Performed by: INTERNAL MEDICINE

## 2025-02-21 PROCEDURE — 99291 CRITICAL CARE FIRST HOUR: CPT

## 2025-02-21 PROCEDURE — 99214 OFFICE O/P EST MOD 30 MIN: CPT | Performed by: NURSE PRACTITIONER

## 2025-02-21 PROCEDURE — 84100 ASSAY OF PHOSPHORUS: CPT | Performed by: EMERGENCY MEDICINE

## 2025-02-21 RX ORDER — ACETAMINOPHEN 325 MG/1
650 TABLET ORAL EVERY 4 HOURS PRN
Status: DISCONTINUED | OUTPATIENT
Start: 2025-02-21 | End: 2025-02-22 | Stop reason: HOSPADM

## 2025-02-21 RX ORDER — AMOXICILLIN 250 MG
2 CAPSULE ORAL 2 TIMES DAILY PRN
Status: DISCONTINUED | OUTPATIENT
Start: 2025-02-21 | End: 2025-02-22 | Stop reason: HOSPADM

## 2025-02-21 RX ORDER — SODIUM CHLORIDE 9 MG/ML
40 INJECTION, SOLUTION INTRAVENOUS AS NEEDED
Status: DISCONTINUED | OUTPATIENT
Start: 2025-02-21 | End: 2025-02-22 | Stop reason: HOSPADM

## 2025-02-21 RX ORDER — SODIUM CHLORIDE 0.9 % (FLUSH) 0.9 %
10 SYRINGE (ML) INJECTION AS NEEDED
Status: DISCONTINUED | OUTPATIENT
Start: 2025-02-21 | End: 2025-02-22 | Stop reason: HOSPADM

## 2025-02-21 RX ORDER — CHOLECALCIFEROL (VITAMIN D3) 25 MCG
1000 TABLET ORAL DAILY
Status: DISCONTINUED | OUTPATIENT
Start: 2025-02-21 | End: 2025-02-22 | Stop reason: HOSPADM

## 2025-02-21 RX ORDER — TIMOLOL MALEATE 5 MG/ML
1 SOLUTION/ DROPS OPHTHALMIC EVERY MORNING
Status: DISCONTINUED | OUTPATIENT
Start: 2025-02-21 | End: 2025-02-22 | Stop reason: HOSPADM

## 2025-02-21 RX ORDER — MAGNESIUM SULFATE HEPTAHYDRATE 40 MG/ML
4 INJECTION, SOLUTION INTRAVENOUS ONCE
Status: DISCONTINUED | OUTPATIENT
Start: 2025-02-21 | End: 2025-02-21

## 2025-02-21 RX ORDER — ACETAMINOPHEN 650 MG/1
650 SUPPOSITORY RECTAL EVERY 4 HOURS PRN
Status: DISCONTINUED | OUTPATIENT
Start: 2025-02-21 | End: 2025-02-22 | Stop reason: HOSPADM

## 2025-02-21 RX ORDER — ATORVASTATIN CALCIUM 80 MG/1
80 TABLET, FILM COATED ORAL NIGHTLY
Status: DISCONTINUED | OUTPATIENT
Start: 2025-02-21 | End: 2025-02-22 | Stop reason: HOSPADM

## 2025-02-21 RX ORDER — ALUMINA, MAGNESIA, AND SIMETHICONE 2400; 2400; 240 MG/30ML; MG/30ML; MG/30ML
15 SUSPENSION ORAL EVERY 6 HOURS PRN
Status: DISCONTINUED | OUTPATIENT
Start: 2025-02-21 | End: 2025-02-22 | Stop reason: HOSPADM

## 2025-02-21 RX ORDER — BISACODYL 10 MG
10 SUPPOSITORY, RECTAL RECTAL DAILY PRN
Status: DISCONTINUED | OUTPATIENT
Start: 2025-02-21 | End: 2025-02-22 | Stop reason: HOSPADM

## 2025-02-21 RX ORDER — NITROGLYCERIN 0.4 MG/1
0.4 TABLET SUBLINGUAL
Status: DISCONTINUED | OUTPATIENT
Start: 2025-02-21 | End: 2025-02-22 | Stop reason: HOSPADM

## 2025-02-21 RX ORDER — ONDANSETRON 4 MG/1
4 TABLET, ORALLY DISINTEGRATING ORAL EVERY 6 HOURS PRN
Status: DISCONTINUED | OUTPATIENT
Start: 2025-02-21 | End: 2025-02-22 | Stop reason: HOSPADM

## 2025-02-21 RX ORDER — CALCIUM CARBONATE 500 MG/1
1 TABLET, CHEWABLE ORAL DAILY
Status: DISCONTINUED | OUTPATIENT
Start: 2025-02-21 | End: 2025-02-22 | Stop reason: HOSPADM

## 2025-02-21 RX ORDER — BISACODYL 5 MG/1
5 TABLET, DELAYED RELEASE ORAL DAILY PRN
Status: DISCONTINUED | OUTPATIENT
Start: 2025-02-21 | End: 2025-02-22 | Stop reason: HOSPADM

## 2025-02-21 RX ORDER — AMIODARONE HYDROCHLORIDE 200 MG/1
200 TABLET ORAL
Status: DISCONTINUED | OUTPATIENT
Start: 2025-02-21 | End: 2025-02-22 | Stop reason: HOSPADM

## 2025-02-21 RX ORDER — POLYETHYLENE GLYCOL 3350 17 G/17G
17 POWDER, FOR SOLUTION ORAL DAILY PRN
Status: DISCONTINUED | OUTPATIENT
Start: 2025-02-21 | End: 2025-02-22 | Stop reason: HOSPADM

## 2025-02-21 RX ORDER — POTASSIUM CHLORIDE 1500 MG/1
40 TABLET, EXTENDED RELEASE ORAL EVERY 4 HOURS
Status: DISCONTINUED | OUTPATIENT
Start: 2025-02-21 | End: 2025-02-21

## 2025-02-21 RX ORDER — LOSARTAN POTASSIUM 50 MG/1
50 TABLET ORAL
Status: DISCONTINUED | OUTPATIENT
Start: 2025-02-21 | End: 2025-02-22 | Stop reason: DRUGHIGH

## 2025-02-21 RX ORDER — MAGNESIUM SULFATE HEPTAHYDRATE 40 MG/ML
2 INJECTION, SOLUTION INTRAVENOUS ONCE
Status: COMPLETED | OUTPATIENT
Start: 2025-02-21 | End: 2025-02-21

## 2025-02-21 RX ORDER — LATANOPROST 50 UG/ML
1 SOLUTION/ DROPS OPHTHALMIC NIGHTLY
Status: DISCONTINUED | OUTPATIENT
Start: 2025-02-21 | End: 2025-02-22 | Stop reason: HOSPADM

## 2025-02-21 RX ORDER — ONDANSETRON 2 MG/ML
4 INJECTION INTRAMUSCULAR; INTRAVENOUS EVERY 6 HOURS PRN
Status: DISCONTINUED | OUTPATIENT
Start: 2025-02-21 | End: 2025-02-22 | Stop reason: HOSPADM

## 2025-02-21 RX ORDER — SODIUM CHLORIDE 0.9 % (FLUSH) 0.9 %
10 SYRINGE (ML) INJECTION EVERY 12 HOURS SCHEDULED
Status: DISCONTINUED | OUTPATIENT
Start: 2025-02-21 | End: 2025-02-22 | Stop reason: HOSPADM

## 2025-02-21 RX ORDER — DIGOXIN 125 MCG
62.5 TABLET ORAL
Status: DISCONTINUED | OUTPATIENT
Start: 2025-02-21 | End: 2025-02-22 | Stop reason: HOSPADM

## 2025-02-21 RX ORDER — ATENOLOL 25 MG/1
12.5 TABLET ORAL EVERY 12 HOURS SCHEDULED
Status: DISCONTINUED | OUTPATIENT
Start: 2025-02-21 | End: 2025-02-22 | Stop reason: HOSPADM

## 2025-02-21 RX ORDER — PANTOPRAZOLE SODIUM 40 MG/1
40 TABLET, DELAYED RELEASE ORAL
Status: DISCONTINUED | OUTPATIENT
Start: 2025-02-21 | End: 2025-02-22 | Stop reason: HOSPADM

## 2025-02-21 RX ORDER — ACETAMINOPHEN 160 MG/5ML
650 SOLUTION ORAL EVERY 4 HOURS PRN
Status: DISCONTINUED | OUTPATIENT
Start: 2025-02-21 | End: 2025-02-22 | Stop reason: HOSPADM

## 2025-02-21 RX ORDER — ALPRAZOLAM 0.5 MG
0.5 TABLET ORAL EVERY 8 HOURS PRN
Status: DISCONTINUED | OUTPATIENT
Start: 2025-02-21 | End: 2025-02-22 | Stop reason: HOSPADM

## 2025-02-21 RX ORDER — HYDROCODONE BITARTRATE AND ACETAMINOPHEN 5; 325 MG/1; MG/1
1 TABLET ORAL EVERY 6 HOURS PRN
Status: DISCONTINUED | OUTPATIENT
Start: 2025-02-21 | End: 2025-02-22 | Stop reason: HOSPADM

## 2025-02-21 RX ORDER — GABAPENTIN 100 MG/1
200 CAPSULE ORAL NIGHTLY
Status: DISCONTINUED | OUTPATIENT
Start: 2025-02-21 | End: 2025-02-22 | Stop reason: HOSPADM

## 2025-02-21 RX ADMIN — PANTOPRAZOLE SODIUM 40 MG: 40 TABLET, DELAYED RELEASE ORAL at 11:34

## 2025-02-21 RX ADMIN — GABAPENTIN 200 MG: 100 CAPSULE ORAL at 20:03

## 2025-02-21 RX ADMIN — ANTACID TABLETS 1 TABLET: 500 TABLET, CHEWABLE ORAL at 13:57

## 2025-02-21 RX ADMIN — SODIUM CHLORIDE 5 MG/HR: 900 INJECTION, SOLUTION INTRAVENOUS at 05:32

## 2025-02-21 RX ADMIN — ATORVASTATIN CALCIUM 80 MG: 80 TABLET, FILM COATED ORAL at 20:03

## 2025-02-21 RX ADMIN — MAGNESIUM SULFATE HEPTAHYDRATE 2 G: 40 INJECTION, SOLUTION INTRAVENOUS at 13:57

## 2025-02-21 RX ADMIN — APIXABAN 5 MG: 5 TABLET, FILM COATED ORAL at 11:34

## 2025-02-21 RX ADMIN — ATENOLOL 12.5 MG: 25 TABLET ORAL at 16:48

## 2025-02-21 RX ADMIN — APIXABAN 5 MG: 5 TABLET, FILM COATED ORAL at 20:03

## 2025-02-21 RX ADMIN — POTASSIUM CHLORIDE 40 MEQ: 1500 TABLET, EXTENDED RELEASE ORAL at 11:34

## 2025-02-21 RX ADMIN — MAGNESIUM SULFATE IN WATER FOR 2 G: 40 INJECTION INTRAVENOUS at 11:39

## 2025-02-21 RX ADMIN — LATANOPROST 1 DROP: 50 SOLUTION/ DROPS OPHTHALMIC at 20:04

## 2025-02-21 RX ADMIN — AMIODARONE HYDROCHLORIDE 200 MG: 200 TABLET ORAL at 13:57

## 2025-02-21 RX ADMIN — Medication 10 ML: at 20:04

## 2025-02-21 RX ADMIN — Medication 1000 UNITS: at 11:34

## 2025-02-21 RX ADMIN — DIGOXIN 62.5 MCG: 0.12 TABLET ORAL at 13:01

## 2025-02-21 NOTE — PROGRESS NOTES
Clinical Pharmacy Services: Medication History    Isabel Busch is a 89 y.o. female presenting to [facility] for   Chief Complaint   Patient presents with    Palpitations       She  has a past medical history of A-fib, GERD (gastroesophageal reflux disease), Glaucoma, Hearing loss, Hyperlipidemia, Hypertension, Low back pain, Multiple gastric polyps, Osteopenia, Peripheral neuropathy, and Stroke.    Allergies as of 02/21/2025    (No Known Allergies)       Medication information was obtained from: Pharmacy  Pharmacy and Phone Number:   [id number] - [place], [place] - [facility] [facility] [address] [facility] - [phone]  - [phone]   [address] [facility] [address]  Phone: [phone] Fax: [phone]    [facility]  [address] [facility] [address]  Phone: [phone] Fax: [phone]        Prior to Admission Medications       Prescriptions Last Dose Informant Patient Reported? Taking?    apixaban (Eliquis) 5 MG tablet tablet  Pharmacy No Yes    Take 1 tablet by mouth Every 12 (Twelve) Hours.    atorvastatin (LIPITOR) 80 MG tablet  Pharmacy No Yes    Take 1 tablet by mouth Every Night.    calcium carbonate (OS-BALWINDER) 600 MG tablet  Pharmacy, Self Yes Yes    Take 1 tablet by mouth Daily.    carboxymethylcellulose (REFRESH PLUS) 0.5 % solution  Self No Yes    Administer 1 drop to both eyes 2 (Two) Times a Day As Needed for Dry Eyes.    cholecalciferol (VITAMIN D3) 1000 units tablet  Pharmacy, Self Yes Yes    Take 1 tablet by mouth Daily.    digoxin (LANOXIN) 125 MCG tablet  Pharmacy No Yes    Take 0.5 tablets by mouth Daily.    gabapentin (NEURONTIN) 100 MG capsule  Pharmacy Yes Yes    Take 2 capsules by mouth Every Night.    latanoprost (XALATAN) 0.005 % ophthalmic solution  Pharmacy Yes Yes    Apply 1 drop to eye(s) as directed by provider Every Night.    losartan (COZAAR) 50 MG tablet  Pharmacy No Yes    Take 1  tablet by mouth Daily.    pantoprazole (PROTONIX) 40 MG EC tablet  Pharmacy No Yes    Take 1 tablet by mouth Every Morning.    Patient taking differently:  Take 1 tablet by mouth Daily.    timolol (TIMOPTIC) 0.5 % ophthalmic solution  Pharmacy Yes Yes    Administer 1 drop to both eyes Every Morning.    triamcinolone (KENALOG) 0.5 % ointment  Pharmacy No Yes    Apply 1 Application topically to the appropriate area as directed 2 (Two) Times a Day. Apply to affected area am and pm for rash    Patient taking differently:  Apply 1 Application topically to the appropriate area as directed 2 (Two) Times a Day.              Medication notes:     This medication list is complete to the best of my knowledge as of 2/21/2025    Please call if questions.    David Arango  Medication History Technician  [phone]    2/21/2025 08:24 EST

## 2025-02-21 NOTE — ED PROVIDER NOTES
" EMERGENCY DEPARTMENT ENCOUNTER  Room Number:  09/09  PCP: Uday Omalley MD  Independent Historians: Patient  Date of encounter:  2/21/2025  Patient Care Team:  Uday Omalley MD as PCP - General (Internal Medicine)     HPI:  Chief Complaint: had concerns including Palpitations.     A complete HPI/ROS/PMH/PSH/SH/FH are unobtainable due to: None    Chronic or social conditions impacting patient care (Social Determinants of Health): None    Context: Isabel Busch is a 89 y.o. female with a medical history of atrial fibrillation, hypertension, GERD who presents to the ED c/o acute palpitations.  Patient woke this morning with a \"funny feeling\" in the left side of her chest.  She had no chest pain or shortness of breath but states that her symptoms remind her of when she has had A-fib flares in the past.  She takes digoxin and Eliquis.  She has not missed any dosages.  She denies arm or leg swelling.  No nausea, vomiting or diarrhea.    Gave the patient 20 mg bolus of diltiazem and initiated diltiazem drip prior to presentation.    Review of prior external notes (non-ED) -and- Review of prior external test results outside of this encounter:  Cardiogram from 12/23/2024 reviewed.  Patient had ejection fraction of 60%, grade 1 diastolic dysfunction.    PAST MEDICAL HISTORY  Active Ambulatory Problems     Diagnosis Date Noted    Closed fracture of radius 05/13/2016    S/P ORIF (open reduction internal fixation) fracture 05/13/2016    History of radius fracture 05/23/2016    History of open reduction and internal fixation (ORIF) procedure 06/20/2016    Osteopenia of multiple sites 08/20/2019    Acute right-sided weakness 06/12/2023    Atrial fibrillation with RVR 07/09/2023    Essential hypertension 07/09/2023    Mixed hyperlipidemia 07/09/2023    GERD without esophagitis 07/09/2023    History of stroke 07/10/2023    Atrial fibrillation with rapid ventricular response 12/23/2024     Resolved " Ambulatory Problems     Diagnosis Date Noted    Stroke 2023     Past Medical History:   Diagnosis Date    A-fib     GERD (gastroesophageal reflux disease)     Glaucoma     Hearing loss     Hyperlipidemia     Hypertension     Low back pain     Multiple gastric polyps     Osteopenia     Peripheral neuropathy        PAST SURGICAL HISTORY  Past Surgical History:   Procedure Laterality Date    BREAST BIOPSY      CATARACT EXTRACTION Bilateral     COLONOSCOPY      ENDOSCOPY N/A 2017    Esophagus dilated, Multiple gastric polyps    EPIDURAL BLOCK      ORIF WRIST FRACTURE Left 2016    Procedure: ORIF LEFT RADIUS;  Surgeon: Juan M Herrera MD;  [place] [facility] [place] [facility] [place]:        FAMILY HISTORY  Family History   Problem Relation Age of Onset    Leukemia Mother     Heart attack Father        SOCIAL HISTORY  Social History     Socioeconomic History    Marital status:    Tobacco Use    Smoking status: Former     Current packs/day: 0.00     Types: Cigarettes     Quit date: 2/3/2024     Years since quittin.0     Passive exposure: Past    Smokeless tobacco: Never   Vaping Use    Vaping status: Never Used   Substance and Sexual Activity    Alcohol use: Not Currently     Alcohol/week: 1.0 standard drink of alcohol     Comment: WITH DINNER    Drug use: No    Sexual activity: Not Currently       ALLERGIES  Patient has no known allergies.    REVIEW OF SYSTEMS  Review of Systems  Included in HPI  All systems reviewed and negative except for those discussed in HPI.    PHYSICAL EXAM    I have reviewed the triage vital signs and nursing notes.    ED Triage Vitals [25 0503]   Temp Heart Rate Resp BP SpO2   97.8 °F (36.6 °C) (!) 127 16 98/69 98 %      Temp src Heart Rate Source Patient Position BP Location FiO2 (%)   -- Monitor -- -- --       Physical Exam  Constitutional:       General: She is not in acute distress.     Appearance: Normal appearance. She is not ill-appearing or  toxic-appearing.   HENT:      Head: Normocephalic and atraumatic.      Nose: Nose normal.      Mouth/Throat:      Mouth: Mucous membranes are moist.      Pharynx: Oropharynx is clear.   Eyes:      Extraocular Movements: Extraocular movements intact.      Conjunctiva/sclera: Conjunctivae normal.      Pupils: Pupils are equal, round, and reactive to light.   Cardiovascular:      Rate and Rhythm: Tachycardia present. Rhythm irregular.      Pulses: Normal pulses.      Heart sounds: Normal heart sounds. No murmur heard.     No friction rub. No gallop.   Pulmonary:      Effort: Pulmonary effort is normal. No respiratory distress.      Breath sounds: Normal breath sounds. No stridor. No wheezing, rhonchi or rales.   Abdominal:      General: Abdomen is flat. There is no distension.      Palpations: Abdomen is soft.      Tenderness: There is no abdominal tenderness. There is no guarding or rebound.   Musculoskeletal:      Cervical back: Normal range of motion and neck supple.   Skin:     General: Skin is warm and dry.   Neurological:      General: No focal deficit present.      Mental Status: She is alert and oriented to person, place, and time. Mental status is at baseline.   Psychiatric:         Mood and Affect: Mood normal.         Behavior: Behavior normal.         Thought Content: Thought content normal.         Judgment: Judgment normal.         LAB RESULTS  Recent Results (from the past 24 hours)   ECG 12 Lead Tachycardia    Collection Time: 02/21/25  5:23 AM   Result Value Ref Range    QT Interval 334 ms    QTC Interval 487 ms   Comprehensive Metabolic Panel    Collection Time: 02/21/25  5:24 AM    Specimen: Blood   Result Value Ref Range    Glucose 132 (H) 65 - 99 mg/dL    BUN 18 8 - 23 mg/dL    Creatinine 0.53 (L) 0.57 - 1.00 mg/dL    Sodium 138 136 - 145 mmol/L    Potassium 3.6 3.5 - 5.2 mmol/L    Chloride 104 98 - 107 mmol/L    CO2 23.0 22.0 - 29.0 mmol/L    Calcium 8.5 8.2 - 9.6 mg/dL    Total Protein 5.7 (L)  6.0 - 8.5 g/dL    Albumin 3.6 3.5 - 5.2 g/dL    ALT (SGPT) 11 1 - 33 U/L    AST (SGOT) 18 1 - 32 U/L    Alkaline Phosphatase 71 39 - 117 U/L    Total Bilirubin 0.2 0.0 - 1.2 mg/dL    Globulin 2.1 gm/dL    A/G Ratio 1.7 g/dL    BUN/Creatinine Ratio 34.0 (H) 7.0 - 25.0    Anion Gap 11.0 5.0 - 15.0 mmol/L    eGFR 87.4 >60.0 mL/min/1.73   Magnesium    Collection Time: 02/21/25  5:24 AM    Specimen: Blood   Result Value Ref Range    Magnesium 1.9 1.7 - 2.3 mg/dL   Phosphorus    Collection Time: 02/21/25  5:24 AM    Specimen: Blood   Result Value Ref Range    Phosphorus 2.9 2.5 - 4.5 mg/dL   BNP    Collection Time: 02/21/25  5:24 AM    Specimen: Blood   Result Value Ref Range    proBNP 325.0 0.0 - 1,800.0 pg/mL   High Sensitivity Troponin T    Collection Time: 02/21/25  5:24 AM    Specimen: Blood   Result Value Ref Range    HS Troponin T 23 (H) <14 ng/L   Protime-INR    Collection Time: 02/21/25  5:24 AM    Specimen: Blood   Result Value Ref Range    Protime 16.0 (H) 11.7 - 14.2 Seconds    INR 1.29 (H) 0.90 - 1.10   aPTT    Collection Time: 02/21/25  5:24 AM    Specimen: Blood   Result Value Ref Range    PTT 29.7 22.7 - 35.4 seconds   Digoxin Level    Collection Time: 02/21/25  5:24 AM    Specimen: Blood   Result Value Ref Range    Digoxin 0.40 (L) 0.60 - 1.20 ng/mL   CBC Auto Differential    Collection Time: 02/21/25  5:24 AM    Specimen: Blood   Result Value Ref Range    WBC 6.29 3.40 - 10.80 10*3/mm3    RBC 3.93 3.77 - 5.28 10*6/mm3    Hemoglobin 12.3 12.0 - 15.9 g/dL    Hematocrit 36.9 34.0 - 46.6 %    MCV 93.9 79.0 - 97.0 fL    MCH 31.3 26.6 - 33.0 pg    MCHC 33.3 31.5 - 35.7 g/dL    RDW 12.6 12.3 - 15.4 %    RDW-SD 43.5 37.0 - 54.0 fl    MPV 10.8 6.0 - 12.0 fL    Platelets 217 140 - 450 10*3/mm3    Neutrophil % 70.9 42.7 - 76.0 %    Lymphocyte % 15.6 (L) 19.6 - 45.3 %    Monocyte % 10.5 5.0 - 12.0 %    Eosinophil % 2.2 0.3 - 6.2 %    Basophil % 0.5 0.0 - 1.5 %    Immature Grans % 0.3 0.0 - 0.5 %    Neutrophils,  Absolute 4.46 1.70 - 7.00 10*3/mm3    Lymphocytes, Absolute 0.98 0.70 - 3.10 10*3/mm3    Monocytes, Absolute 0.66 0.10 - 0.90 10*3/mm3    Eosinophils, Absolute 0.14 0.00 - 0.40 10*3/mm3    Basophils, Absolute 0.03 0.00 - 0.20 10*3/mm3    Immature Grans, Absolute 0.02 0.00 - 0.05 10*3/mm3    nRBC 0.0 0.0 - 0.2 /100 WBC       RADIOLOGY  No Radiology Exams Resulted Within Past 24 Hours    MEDICATIONS GIVEN IN ER  Medications   dilTIAZem (CARDIZEM) 100 mg in 100 mL NS infusion (ADV) (5 mg/hr Intravenous New Bag 2/21/25 0532)       ORDERS PLACED DURING THIS VISIT:  Orders Placed This Encounter   Procedures    XR Chest 1 View    Comprehensive Metabolic Panel    Magnesium    Phosphorus    BNP    High Sensitivity Troponin T    Protime-INR    aPTT    Digoxin Level    CBC Auto Differential    High Sensitivity Troponin T 1Hr    Family Medicine Consult    ECG 12 Lead Tachycardia    Initiate Observation Status    CBC & Differential       OUTPATIENT MEDICATION MANAGEMENT:  Current Facility-Administered Medications Ordered in Epic   Medication Dose Route Frequency Provider Last Rate Last Admin    dilTIAZem (CARDIZEM) 100 mg in 100 mL NS infusion (ADV)  5-15 mg/hr Intravenous Continuous Mario Dos Santos MD 5 mL/hr at 02/21/25 0532 5 mg/hr at 02/21/25 0532     Current Outpatient Medications Ordered in Epic   Medication Sig Dispense Refill    apixaban (Eliquis) 5 MG tablet tablet Take 1 tablet by mouth Every 12 (Twelve) Hours. 180 tablet 3    atorvastatin (LIPITOR) 80 MG tablet Take 1 tablet by mouth Every Night. 90 tablet 3    calcium carbonate (OS-BALWINDER) 600 MG tablet Take 1 tablet by mouth Daily.      carboxymethylcellulose (REFRESH PLUS) 0.5 % solution Administer 1 drop to both eyes 2 (Two) Times a Day As Needed for Dry Eyes.      cholecalciferol (VITAMIN D3) 1000 units tablet Take 1 tablet by mouth Daily.      digoxin (LANOXIN) 125 MCG tablet Take 0.5 tablets by mouth Daily. 90 tablet 1    gabapentin (NEURONTIN) 100 MG capsule  Take 2 capsules by mouth Every Night.      latanoprost (XALATAN) 0.005 % ophthalmic solution Apply 1 drop to eye(s) as directed by provider Every Night.      losartan (COZAAR) 50 MG tablet Take 1 tablet by mouth Daily. 90 tablet 3    pantoprazole (PROTONIX) 40 MG EC tablet Take 1 tablet by mouth Every Morning. (Patient taking differently: Take 1 tablet by mouth Daily.) 90 tablet 0    timolol (TIMOPTIC) 0.5 % ophthalmic solution Administer 1 drop to both eyes Every Morning.      triamcinolone (KENALOG) 0.5 % ointment Apply 1 Application topically to the appropriate area as directed 2 (Two) Times a Day. Apply to affected area am and pm for rash 15 g 0       PROCEDURES  Procedures    PROGRESS, DATA ANALYSIS, CONSULTS, AND MEDICAL DECISION MAKING  All labs have been independently interpreted by me.  All radiology studies have been reviewed by me. All EKG's have been independently viewed and interpreted by me.  Discussion below represents my analysis of pertinent findings related to patient's condition, differential diagnosis, treatment plan and final disposition.    Differential diagnosis includes but is not limited to fibrillation, electrolyte derangement, ACS.    Clinical Scores:                   ED Course as of 02/21/25 0624   Fri Feb 21, 2025   0527 EKG interpreted by myself  Time: 0523  Rate: 129  Rhythm: a fib  No ST elevation or depression  Normal intervals  RBBB [AB]   0606 HS Troponin T(!): 23 [AB]   0606 Digoxin(!): 0.40 [AB]   0606 proBNP: 325.0 [AB]   0606 INR(!): 1.29 [AB]   0606 WBC: 6.29 [AB]   0606 Hemoglobin: 12.3 [AB]   0611 XR Chest 1 View  My independent interpretation of the imaging study is no lobar infiltrate or pneumothorax [AB]   0614 Phone call with Dr. Milner.  Discussed the patient, relevant history, exam, diagnostics, ED findings/progress, and concerns. They agree to admit the patient to telemetry. Care assumed by the admitting physician at this time. [AB]   0617 CRITICAL CARE PERFORMED  BY ED PHYSICIAN    Critical care provider statement:    Critical care time (minutes): 32.   Critical care time was exclusive of:  Separately billable procedures and treating other patients   Critical care was necessary to treat or prevent imminent or life-threatening deterioration of the following conditions:  Cardiac Failure   Critical care was time spent personally by me on the following activities:  Development of treatment plan with patient or surrogate, discussions with consultants, evaluation of patient's response to treatment, examination of patient, obtaining history from patient or surrogate, ordering and performing treatments and interventions, ordering and review of laboratory studies, ordering and review of radiographic studies, pulse oximetry, re-evaluation of patient's condition and review of old charts. Critical Care indicators:      See progress notes for further documentation.   [AB]      ED Course User Index  [AB] Mario Dos Santos MD             AS OF 06:24 EST VITALS:    BP - 108/93  HR - (!) 129  TEMP - 97.8 °F (36.6 °C)  O2 SATS - 98%    COMPLEXITY OF CARE  The patient requires admission.      DIAGNOSIS  Final diagnoses:   Atrial fibrillation with RVR   Elevated troponin         DISPOSITION  ED Disposition       ED Disposition   Decision to Admit    Condition   --    Comment   Level of Care: Telemetry [5]   Diagnosis: Atrial fibrillation with RVR [652106]   Admitting Physician: YUE MARTINO [[id number]]   Attending Physician: YUE MARTINO [[id number]]   Is patient appropriate for Inpatient Observation Unit?: No [0]                  Please note that portions of this document were completed with a voice recognition program.    Note Disclaimer: At [facility], we believe that sharing information builds trust and better relationships. You are receiving this note because you recently visited [facility]. It is possible you will see health information before a provider has talked  with you about it. This kind of information can be easy to misunderstand. To help you fully understand what it means for your health, we urge you to discuss this note with your provider.         Mario Dos Santos MD  02/21/25 0624

## 2025-02-21 NOTE — ED TRIAGE NOTES
Pt to triage from home via [facility] [id number] with c/o afib/rvr with rate in 170's.  Pt given cardizem bolus 19 mg, and placed on 25 mg cardizem drip over 1 hour per ems.  Pt given 324 mg aspirin by fd prior to ems arrival.  Pt became slightly hypotensive, so ns bolus started per ems.

## 2025-02-21 NOTE — CONSULTS
Chap visited pt, Consuelo, and her daughter who was at bedside.  Pt and chap visited about pt's U[employer] and how it is doing, and how pt is doing.  Thanks was expressed.  Chap remains available.

## 2025-02-21 NOTE — CONSULTS
Patient Name: Isabel Busch  :1933  89 y.o.    Date of Admission: 2025  Date of Consultation:  25  Encounter Provider: LEO Ramos  Place of Service: [facility] CARDIOLOGY  Referring Provider: Uday Mckeon*  Patient Care Team:  Uday Omalley MD as PCP - General (Internal Medicine)      Chief complaint: palpitations     History of Present Illness:  Ms. Busch is a 89 year old woman followed by Dr. Irby with hypertension, hyperlipidemia, RBBB, carotid artery disease, PAF, stroke and former smoker.     In 2023 she had echocardiogram showing low-normal left ventricular systolic function at 50%, grade 2 diastolic dysfunction, mitral annular calcification with mild mitral valve stenosis and negative saline study.  Carotid duplex showed bilateral stenosis less than 50%.     In 2023 she was found to have new onset atrial fibrillation.  She was loaded with digoxin as her blood pressure did not tolerate diltiazem.  Given CHADS2 Vascor of 7 she was anticoagulated with Eliquis and aspirin was discontinued.  She was continued on both Eliquis and clopidogrel at that time.     She was admitted in 2024 for chest discomfort that woke her from sleep. She was in rapid atrial fibrillation. She spontaneously converted back to NSR. She was discharged on digoxin every other day.     She followed up with LEO on 25. She was doing well. She was on digoxin 62.5 mcg daily. No changes were made.     She is here again for symptomatic AF. She woke up this morning with palpitations and a funny feeling on the left side of her chest. She called EMS who gave her diltiazem en route.     She remains in AF on high dose IV diltiazem and now her BP is low. She does feel a little better. This is her 4th admission for symptomatic AF. She takes apixaban and has not missed any doses.     Echo 24       Left ventricular systolic function is  normal. Left ventricular ejection fraction appears to be 61 - 65%.    Left ventricular diastolic function is consistent with (grade II w/high LAP) pseudonormalization.    The left atrial cavity is mildly dilated.    Mild aortic valve stenosis is present. Aortic valve area is 1.8 cm2.    Peak velocity of the flow distal to the aortic valve is 227.5 cm/s. Aortic valve mean pressure gradient is 12 mmHg.    Estimated right ventricular systolic pressure from tricuspid regurgitation is mildly elevated (35-45 mmHg). Calculated right ventricular systolic pressure from tricuspid regurgitation is 38 mmHg.         Past Medical History:   Diagnosis Date    A-fib     GERD (gastroesophageal reflux disease)     Glaucoma     Hearing loss     Hyperlipidemia     Hypertension     Low back pain     Multiple gastric polyps     Osteopenia     Peripheral neuropathy     Stroke        Past Surgical History:   Procedure Laterality Date    BREAST BIOPSY      CATARACT EXTRACTION Bilateral     COLONOSCOPY      ENDOSCOPY N/A 02/13/2017    Esophagus dilated, Multiple gastric polyps    EPIDURAL BLOCK      ORIF WRIST FRACTURE Left 05/06/2016    Procedure: ORIF LEFT RADIUS;  Surgeon: Juan M Herrera MD;  Location: [facility] OR [facility];  Service:          Prior to Admission medications    Medication Sig Start Date End Date Taking? Authorizing Provider   apixaban (Eliquis) 5 MG tablet tablet Take 1 tablet by mouth Every 12 (Twelve) Hours. 1/13/25  Yes Cynthia Bledsoe APRN   atorvastatin (LIPITOR) 80 MG tablet Take 1 tablet by mouth Every Night. 1/13/25  Yes Cynthia Bledsoe APRN   calcium carbonate (OS-BALWINDER) 600 MG tablet Take 1 tablet by mouth Daily.   Yes Pretty Valentin MD   carboxymethylcellulose (REFRESH PLUS) 0.5 % solution Administer 1 drop to both eyes 2 (Two) Times a Day As Needed for Dry Eyes. 12/24/24  Yes Saeed Retana MD   cholecalciferol (VITAMIN D3) 1000 units tablet Take 1 tablet by mouth Daily.   Yes Pretty Valentin MD    digoxin (LANOXIN) 125 MCG tablet Take 0.5 tablets by mouth Daily. 25  Yes Cynthia Bledsoe APRN   gabapentin (NEURONTIN) 100 MG capsule Take 2 capsules by mouth Every Night. 24  Yes Pretty Valentin MD   latanoprost (XALATAN) 0.005 % ophthalmic solution Apply 1 drop to eye(s) as directed by provider Every Night. 24  Yes Pretty Valentin MD   losartan (COZAAR) 50 MG tablet Take 1 tablet by mouth Daily. 25  Yes Cynthia Bledsoe APRN   pantoprazole (PROTONIX) 40 MG EC tablet Take 1 tablet by mouth Every Morning.  Patient taking differently: Take 1 tablet by mouth Daily. 23  Yes Uday Omalley MD   timolol (TIMOPTIC) 0.5 % ophthalmic solution Administer 1 drop to both eyes Every Morning.   Yes ProviderPretty MD   triamcinolone (KENALOG) 0.5 % ointment Apply 1 Application topically to the appropriate area as directed 2 (Two) Times a Day. Apply to affected area am and pm for rash  Patient taking differently: Apply 1 Application topically to the appropriate area as directed 2 (Two) Times a Day. 25  Yes Cynthia Bledsoe APRN       No Known Allergies    Social History     Socioeconomic History    Marital status:    Tobacco Use    Smoking status: Former     Current packs/day: 0.00     Types: Cigarettes     Quit date: 2/3/2024     Years since quittin.0     Passive exposure: Past    Smokeless tobacco: Never   Vaping Use    Vaping status: Never Used   Substance and Sexual Activity    Alcohol use: Not Currently     Alcohol/week: 1.0 standard drink of alcohol     Comment: WITH DINNER    Drug use: No    Sexual activity: Not Currently       Family History   Problem Relation Age of Onset    Leukemia Mother     Heart attack Father        REVIEW OF SYSTEMS:   All systems reviewed.  Pertinent positives identified in HPI.  All other systems are negative.      Objective:     Vitals:    25 0922 25 0931 25 1001 25 1031   BP:  94/73 104/79 111/70   BP  Location:       Patient Position:       Pulse:  110 (!) 135 108   Resp:       Temp: 98.1 °F (36.7 °C)      TempSrc: Oral      SpO2:   93% 93%   Weight:       Height:         Body mass index is 25.65 kg/m².    Physical Exam:  Constitutional: She is oriented to person, place, and time. She appears well-developed. She does not appear ill.   HENT:   Head: Normocephalic and atraumatic. Head is without contusion.   Right Ear: Hearing normal. No drainage.   Left Ear: Hearing normal. No drainage.   Nose: No nasal deformity. No epistaxis.   Eyes: Lids are normal. Right eye exhibits no exudate. Left eye exhibits no exudate.  Neck: No JVD present.   Cardiovascular: Normal rate, irregular rhythm and normal heart sounds.    Pulses:       Posterior tibial pulses are 2+ on the right side, and 2+ on the left side.   Pulmonary/Chest: Effort normal and breath sounds normal.   Abdominal: Soft. Normal appearance and bowel sounds are normal. There is no tenderness.   Musculoskeletal: Normal range of motion.        Right shoulder: She exhibits no deformity.        Left shoulder: She exhibits no deformity.   Neurological: She is alert and oriented to person, place, and time. She has normal strength.   Skin: Skin is warm, dry and intact. No rash noted.   Psychiatric: She has a normal mood and affect. Her behavior is normal. Thought content normal.   Vitals reviewed      Lab Review:     Results from last 7 days   Lab Units 02/21/25  0524   SODIUM mmol/L 138   POTASSIUM mmol/L 3.6   CHLORIDE mmol/L 104   CO2 mmol/L 23.0   BUN mg/dL 18   CREATININE mg/dL 0.53*   CALCIUM mg/dL 8.5   BILIRUBIN mg/dL 0.2   ALK PHOS U/L 71   ALT (SGPT) U/L 11   AST (SGOT) U/L 18   GLUCOSE mg/dL 132*     Results from last 7 days   Lab Units 02/21/25  0639 02/21/25  0524   HSTROP T ng/L 27* 23*     Results from last 7 days   Lab Units 02/21/25  0524   WBC 10*3/mm3 6.29   HEMOGLOBIN g/dL 12.3   HEMATOCRIT % 36.9   PLATELETS 10*3/mm3 217     Results from last 7 days    Lab Units 02/21/25  0524   INR  1.29*   APTT seconds 29.7     Results from last 7 days   Lab Units 02/21/25  0524   MAGNESIUM mg/dL 1.9                   Current Facility-Administered Medications:     acetaminophen (TYLENOL) tablet 650 mg, 650 mg, Oral, Q4H PRN **OR** acetaminophen (TYLENOL) 160 MG/5ML oral solution 650 mg, 650 mg, Oral, Q4H PRN **OR** acetaminophen (TYLENOL) suppository 650 mg, 650 mg, Rectal, Q4H PRN, Uday Omalley MD    ALPRAZolam (XANAX) tablet 0.5 mg, 0.5 mg, Oral, Q8H PRN, Uday Omalley MD    aluminum-magnesium hydroxide-simethicone (MAALOX MAX) 400-400-40 MG/5ML suspension 15 mL, 15 mL, Oral, Q6H PRN, Uday Omalley MD    apixaban (ELIQUIS) tablet 5 mg, 5 mg, Oral, Q12H, Uday Omalley MD    atorvastatin (LIPITOR) tablet 80 mg, 80 mg, Oral, Nightly, Uday Omalley MD    sennosides-docusate (PERICOLACE) 8.6-50 MG per tablet 2 tablet, 2 tablet, Oral, BID PRN **AND** polyethylene glycol (MIRALAX) packet 17 g, 17 g, Oral, Daily PRN **AND** bisacodyl (DULCOLAX) EC tablet 5 mg, 5 mg, Oral, Daily PRN **AND** bisacodyl (DULCOLAX) suppository 10 mg, 10 mg, Rectal, Daily PRN, Uday Omalley MD    calcium carbonate (TUMS) chewable tablet 500 mg (200 mg elemental), 1 tablet, Oral, Daily, Uday Omalley MD    Calcium Replacement - Follow Nurse / BPA Driven Protocol, , Not Applicable, PRN, Uday Omalley MD    cholecalciferol (VITAMIN D3) tablet 1,000 Units, 1,000 Units, Oral, Daily, Uday Omalley MD    digoxin (LANOXIN) tablet 62.5 mcg, 62.5 mcg, Oral, Daily, Uday Omalley MD    dilTIAZem (CARDIZEM) 100 mg in 100 mL NS infusion (ADV), 5-15 mg/hr, Intravenous, Continuous, Uday Omalley MD, Last Rate: 10 mL/hr at 02/21/25 0630, 10 mg/hr at 02/21/25 0630    gabapentin (NEURONTIN) capsule 200 mg, 200 mg, Oral, Nightly, Uday Omalley MD     HYDROcodone-acetaminophen (NORCO) 5-325 MG per tablet 1 tablet, 1 tablet, Oral, Q6H PRN, Uday Omalley MD    latanoprost (XALATAN) 0.005 % ophthalmic solution 1 drop, 1 drop, Both Eyes, Nightly, Uday Omalley MD    losartan (COZAAR) tablet 50 mg, 50 mg, Oral, Q24H, Uday Omalley MD    Magnesium Cardiology Dose Replacement - Follow Nurse / BPA Driven Protocol, , Not Applicable, PRN, Uday Omalley MD    magnesium sulfate 2g/50 mL (PREMIX) infusion, 4 g, Intravenous, Once, Uday Omalley MD    nitroglycerin (NITROSTAT) SL tablet 0.4 mg, 0.4 mg, Sublingual, Q5 Min PRN, Uday Omalley MD    ondansetron ODT (ZOFRAN-ODT) disintegrating tablet 4 mg, 4 mg, Oral, Q6H PRN **OR** ondansetron (ZOFRAN) injection 4 mg, 4 mg, Intravenous, Q6H PRN, Uday Omalley MD    pantoprazole (PROTONIX) EC tablet 40 mg, 40 mg, Oral, Q AM, Uday Omalley MD    Phosphorus Replacement - Follow Nurse / BPA Driven Protocol, , Not Applicable, PRN, Uday Omalley MD    Polyvinyl Alcohol-Povidone PF (ARTIFICIAL TEARS) 1.4-0.6 % ophthalmic solution 2 drop, 2 drop, Both Eyes, 4x Daily PRN, Uday Omalley MD    potassium chloride (KLOR-CON M20) CR tablet 40 mEq, 40 mEq, Oral, Q4H, Uday Omalley MD    Potassium Replacement - Follow Nurse / BPA Driven Protocol, , Not Applicable, PRN, Uday Omalley MD    sodium chloride 0.9 % flush 10 mL, 10 mL, Intravenous, Q12H, Uday Omalley MD    sodium chloride 0.9 % flush 10 mL, 10 mL, Intravenous, PRN, Uday Omalley MD    sodium chloride 0.9 % infusion 40 mL, 40 mL, Intravenous, PRN, Uday Omalley MD    timolol (TIMOPTIC) 0.5 % ophthalmic solution 1 drop, 1 drop, Both Eyes, [facility] HospitalLyssa Christopher S., MD    Assessment and Plan:       Paroxysmal atrial fibrillation, this is her 4th admission for symptomatic AF. She does not have  a lot of BP for AV leah blockers . She is on minimal digoxin due to age and risk of toxicity.  I'm going to try to give her a little amiodarone , see if she will convert and we can keep her in SR better. She is on apixaban and has not missed any doses.   History of stroke  RBBB  Hypertension, hold losartan for now.   Hyperlipidemia     Stop diltiazem drip.   IV amiodarone. Add beta blocker. Continue apixaban.     Soheila Payton, LEO  02/21/25  11:05 EST

## 2025-02-21 NOTE — CONSULTS
Patient Name: Isabel Busch  :1933  89 y.o.    Date of Admission: 2025  Date of Consultation:  25  Encounter Provider: LEO Ramos  Place of Service: [facility] CARDIOLOGY  Referring Provider: Uday Mckeon*  Patient Care Team:  Uday Omalley MD as PCP - General (Internal Medicine)      Chief complaint: palpitations     History of Present Illness:  Ms. Busch is a 89 year old woman followed by Dr. Irby with hypertension, hyperlipidemia, RBBB, carotid artery disease, PAF, stroke and former smoker.     In 2023 she had echocardiogram showing low-normal left ventricular systolic function at 50%, grade 2 diastolic dysfunction, mitral annular calcification with mild mitral valve stenosis and negative saline study.  Carotid duplex showed bilateral stenosis less than 50%.     In 2023 she was found to have new onset atrial fibrillation.  She was loaded with digoxin as her blood pressure did not tolerate diltiazem.  Given CHADS2 Vascor of 7 she was anticoagulated with Eliquis and aspirin was discontinued.  She was continued on both Eliquis and clopidogrel at that time.     She was admitted in 2024 for chest discomfort that woke her from sleep. She was in rapid atrial fibrillation. She spontaneously converted back to NSR. She was discharged on digoxin every other day.     She followed up with LEO on 25. She was doing well. She was on digoxin 62.5 mcg daily. No changes were made.     She is here again for symptomatic AF. She woke up this morning with palpitations and a funny feeling on the left side of her chest. She called EMS who gave her diltiazem en route.     She remains in AF on high dose IV diltiazem and now her BP is low. She does feel a little better. This is her 4th admission for symptomatic AF. She takes apixaban and has not missed any doses.     Echo 24       Left ventricular systolic function is  normal. Left ventricular ejection fraction appears to be 61 - 65%.    Left ventricular diastolic function is consistent with (grade II w/high LAP) pseudonormalization.    The left atrial cavity is mildly dilated.    Mild aortic valve stenosis is present. Aortic valve area is 1.8 cm2.    Peak velocity of the flow distal to the aortic valve is 227.5 cm/s. Aortic valve mean pressure gradient is 12 mmHg.    Estimated right ventricular systolic pressure from tricuspid regurgitation is mildly elevated (35-45 mmHg). Calculated right ventricular systolic pressure from tricuspid regurgitation is 38 mmHg.         Past Medical History:   Diagnosis Date    A-fib     GERD (gastroesophageal reflux disease)     Glaucoma     Hearing loss     Hyperlipidemia     Hypertension     Low back pain     Multiple gastric polyps     Osteopenia     Peripheral neuropathy     Stroke        Past Surgical History:   Procedure Laterality Date    BREAST BIOPSY      CATARACT EXTRACTION Bilateral     COLONOSCOPY      ENDOSCOPY N/A 02/13/2017    Esophagus dilated, Multiple gastric polyps    EPIDURAL BLOCK      ORIF WRIST FRACTURE Left 05/06/2016    Procedure: ORIF LEFT RADIUS;  Surgeon: Juan M Herrera MD;  Location: [facility] OR [facility] [place];  Service:          Prior to Admission medications    Medication Sig Start Date End Date Taking? Authorizing Provider   apixaban (Eliquis) 5 MG tablet tablet Take 1 tablet by mouth Every 12 (Twelve) Hours. 1/13/25  Yes Cynthia Bledsoe APRN   atorvastatin (LIPITOR) 80 MG tablet Take 1 tablet by mouth Every Night. 1/13/25  Yes Cynthia Bledsoe APRN   calcium carbonate (OS-BALWINDER) 600 MG tablet Take 1 tablet by mouth Daily.   Yes Pretty Valentin MD   carboxymethylcellulose (REFRESH PLUS) 0.5 % solution Administer 1 drop to both eyes 2 (Two) Times a Day As Needed for Dry Eyes. 12/24/24  Yes Saeed Retana MD   cholecalciferol (VITAMIN D3) 1000 units tablet Take 1 tablet by mouth Daily.   Yes Pretty Valentin MD    digoxin (LANOXIN) 125 MCG tablet Take 0.5 tablets by mouth Daily. 25  Yes Cynthia Bledsoe APRN   gabapentin (NEURONTIN) 100 MG capsule Take 2 capsules by mouth Every Night. 24  Yes Pretty Valentin MD   latanoprost (XALATAN) 0.005 % ophthalmic solution Apply 1 drop to eye(s) as directed by provider Every Night. 24  Yes Pretty Valentin MD   losartan (COZAAR) 50 MG tablet Take 1 tablet by mouth Daily. 25  Yes Cynthia Bledsoe APRN   pantoprazole (PROTONIX) 40 MG EC tablet Take 1 tablet by mouth Every Morning.  Patient taking differently: Take 1 tablet by mouth Daily. 23  Yes Uday Omalley MD   timolol (TIMOPTIC) 0.5 % ophthalmic solution Administer 1 drop to both eyes Every Morning.   Yes ProviderPretty MD   triamcinolone (KENALOG) 0.5 % ointment Apply 1 Application topically to the appropriate area as directed 2 (Two) Times a Day. Apply to affected area am and pm for rash  Patient taking differently: Apply 1 Application topically to the appropriate area as directed 2 (Two) Times a Day. 25  Yes Cynthia Bledsoe APRN       No Known Allergies    Social History     Socioeconomic History    Marital status:    Tobacco Use    Smoking status: Former     Current packs/day: 0.00     Types: Cigarettes     Quit date: 2/3/2024     Years since quittin.0     Passive exposure: Past    Smokeless tobacco: Never   Vaping Use    Vaping status: Never Used   Substance and Sexual Activity    Alcohol use: Not Currently     Alcohol/week: 1.0 standard drink of alcohol     Comment: WITH DINNER    Drug use: No    Sexual activity: Not Currently       Family History   Problem Relation Age of Onset    Leukemia Mother     Heart attack Father        REVIEW OF SYSTEMS:   All systems reviewed.  Pertinent positives identified in HPI.  All other systems are negative.      Objective:     Vitals:    25 1031 25 1131 25 1201 25 1231   BP: 111/70 (!) 86/59 106/72  101/73   BP Location:       Patient Position:       Pulse: 108 89 91 106   Resp:       Temp:       TempSrc:       SpO2: 93% 93% 94% 93%   Weight:       Height:         Body mass index is 25.65 kg/m².    Physical Exam:  Constitutional: She is oriented to person, place, and time. She appears well-developed. She does not appear ill.   HENT:   Head: Normocephalic and atraumatic. Head is without contusion.   Right Ear: Hearing normal. No drainage.   Left Ear: Hearing normal. No drainage.   Nose: No nasal deformity. No epistaxis.   Eyes: Lids are normal. Right eye exhibits no exudate. Left eye exhibits no exudate.  Neck: No JVD present.   Cardiovascular: Normal rate, irregular rhythm and normal heart sounds.    Pulses:       Posterior tibial pulses are 2+ on the right side, and 2+ on the left side.   Pulmonary/Chest: Effort normal and breath sounds normal.   Abdominal: Soft. Normal appearance and bowel sounds are normal. There is no tenderness.   Musculoskeletal: Normal range of motion.        Right shoulder: She exhibits no deformity.        Left shoulder: She exhibits no deformity.   Neurological: She is alert and oriented to person, place, and time. She has normal strength.   Skin: Skin is warm, dry and intact. No rash noted.   Psychiatric: She has a normal mood and affect. Her behavior is normal. Thought content normal.   Vitals reviewed      Lab Review:     Results from last 7 days   Lab Units 02/21/25  0524   SODIUM mmol/L 138   POTASSIUM mmol/L 3.6   CHLORIDE mmol/L 104   CO2 mmol/L 23.0   BUN mg/dL 18   CREATININE mg/dL 0.53*   CALCIUM mg/dL 8.5   BILIRUBIN mg/dL 0.2   ALK PHOS U/L 71   ALT (SGPT) U/L 11   AST (SGOT) U/L 18   GLUCOSE mg/dL 132*     Results from last 7 days   Lab Units 02/21/25  0639 02/21/25  0524   HSTROP T ng/L 27* 23*     Results from last 7 days   Lab Units 02/21/25  0524   WBC 10*3/mm3 6.29   HEMOGLOBIN g/dL 12.3   HEMATOCRIT % 36.9   PLATELETS 10*3/mm3 217     Results from last 7 days   Lab  Units 02/21/25  0524   INR  1.29*   APTT seconds 29.7     Results from last 7 days   Lab Units 02/21/25  0524   MAGNESIUM mg/dL 1.9                   Current Facility-Administered Medications:     acetaminophen (TYLENOL) tablet 650 mg, 650 mg, Oral, Q4H PRN **OR** acetaminophen (TYLENOL) 160 MG/5ML oral solution 650 mg, 650 mg, Oral, Q4H PRN **OR** acetaminophen (TYLENOL) suppository 650 mg, 650 mg, Rectal, Q4H PRN, Uday Omalley MD    ALPRAZolam (XANAX) tablet 0.5 mg, 0.5 mg, Oral, Q8H PRN, Uday Omalley MD    aluminum-magnesium hydroxide-simethicone (MAALOX MAX) 400-400-40 MG/5ML suspension 15 mL, 15 mL, Oral, Q6H PRN, Uday Omalley MD    amiodarone 150 mg in 100 mL D5W (loading dose), 150 mg, Intravenous, Once **FOLLOWED BY** amiodarone 360 mg in 200 mL D5W infusion, 1 mg/min, Intravenous, Continuous **FOLLOWED BY** amiodarone 360 mg in 200 mL D5W infusion, 0.5 mg/min, Intravenous, Continuous, Soheila Payton L, APRN    apixaban (ELIQUIS) tablet 5 mg, 5 mg, Oral, Q12H, Uday Omalley MD, 5 mg at 02/21/25 1134    atorvastatin (LIPITOR) tablet 80 mg, 80 mg, Oral, Nightly, Uday Omalley MD    sennosides-docusate (PERICOLACE) 8.6-50 MG per tablet 2 tablet, 2 tablet, Oral, BID PRN **AND** polyethylene glycol (MIRALAX) packet 17 g, 17 g, Oral, Daily PRN **AND** bisacodyl (DULCOLAX) EC tablet 5 mg, 5 mg, Oral, Daily PRN **AND** bisacodyl (DULCOLAX) suppository 10 mg, 10 mg, Rectal, Daily PRN, Uday Omalley MD    calcium carbonate (TUMS) chewable tablet 500 mg (200 mg elemental), 1 tablet, Oral, Daily, Uday Omalley MD    Calcium Replacement - Follow Nurse / BPA Driven Protocol, , Not Applicable, PRN, Uday Omalley MD    cholecalciferol (VITAMIN D3) tablet 1,000 Units, 1,000 Units, Oral, Daily, Uday Omalley MD, 1,000 Units at 02/21/25 1134    digoxin (LANOXIN) tablet 62.5 mcg, 62.5 mcg, Oral, Daily,  Uday Omalley MD, 62.5 mcg at 02/21/25 1301    gabapentin (NEURONTIN) capsule 200 mg, 200 mg, Oral, Nightly, Uday Omalley MD    HYDROcodone-acetaminophen (NORCO) 5-325 MG per tablet 1 tablet, 1 tablet, Oral, Q6H PRN, Uday Omalley MD    latanoprost (XALATAN) 0.005 % ophthalmic solution 1 drop, 1 drop, Both Eyes, Nightly, Uday Omalley MD    [Held by provider] losartan (COZAAR) tablet 50 mg, 50 mg, Oral, Q24H, Uday Omalley MD    Magnesium Cardiology Dose Replacement - Follow Nurse / BPA Driven Protocol, , Not Applicable, PRN, Uday Omalley MD    magnesium sulfate 2g/50 mL (PREMIX) infusion, 2 g, Intravenous, Once, Uday Omalley MD    nitroglycerin (NITROSTAT) SL tablet 0.4 mg, 0.4 mg, Sublingual, Q5 Min PRN, Uday Omalley MD    ondansetron ODT (ZOFRAN-ODT) disintegrating tablet 4 mg, 4 mg, Oral, Q6H PRN **OR** ondansetron (ZOFRAN) injection 4 mg, 4 mg, Intravenous, Q6H PRN, Uday Omalley MD    pantoprazole (PROTONIX) EC tablet 40 mg, 40 mg, Oral, Q AM, Uday Omalley MD, 40 mg at 02/21/25 1134    Phosphorus Replacement - Follow Nurse / BPA Driven Protocol, , Not Applicable, PRN, Uday Omalley MD    Polyvinyl Alcohol-Povidone PF (ARTIFICIAL TEARS) 1.4-0.6 % ophthalmic solution 2 drop, 2 drop, Both Eyes, 4x Daily PRN, Uday Omalley MD    potassium chloride (KLOR-CON M20) CR tablet 40 mEq, 40 mEq, Oral, Q4H, Uday Omalley MD, 40 mEq at 02/21/25 1134    Potassium Replacement - Follow Nurse / BPA Driven Protocol, , Not Applicable, PRN, Uday Omalley MD    sodium chloride 0.9 % flush 10 mL, 10 mL, Intravenous, Q12H, Uday Omalley MD    sodium chloride 0.9 % flush 10 mL, 10 mL, Intravenous, PRN, Uday Omalley MD    sodium chloride 0.9 % infusion 40 mL, 40 mL, Intravenous, PRN, Uday Omalley MD    timolol  (TIMOPTIC) 0.5 % ophthalmic solution 1 drop, 1 drop, Both Eyes, Lyssa DE JESUS Christopher S., MD    Assessment and Plan:       Paroxysmal atrial fibrillation, this is her 4th admission for symptomatic AF. She does not have a lot of BP for AV leah blockers (though on losartan) . She is on minimal digoxin due to age and risk of toxicity.  I'm going to try to give her a little amiodarone , see if she will convert and we can keep her in SR better. She is on apixaban and has not missed any doses.   History of stroke  RBBB  Hypertension, hold losartan for now.   Hyperlipidemia   Mild AS, preserved LVEF per echo 12/24/24.     Stop diltiazem drip.   IV amiodarone. Hold losartan. Add beta blocker. Continue apixaban.     Soheila Payton, APRN  02/21/25  13:17 EST    She converted to SR. HR 57 prior to IV amiodarone. Will start low dose oral amiodarone . Continue with beta blocker. Watch tonight. Likely home tomorrow.

## 2025-02-21 NOTE — PLAN OF CARE
Goal Outcome Evaluation:  Plan of Care Reviewed With: patient, child      Patient admitted with afib rvr, cardizem gtt from ER d/c mid morning with digoxin restarted.  Mag replaced.  IV amiodarone ordered, ultimately d/c prior to administration of loading bolus because pt converted on her own to SR.  Other rhythm control meds initiated per cardiology, see notes.  Pt remains in SR this evening.  Ambulating with walker and stand by assist to bathroom.  No dyspnea or SOA noted with exertion.  All other vital signs stable.    Progress: improving

## 2025-02-21 NOTE — H&P
HISTORY AND PHYSICAL EXAM    DATE OF ADMISSION: 2/21/2025    IMPRESSION    Atrial fibrillation with rapid ventricular response.   Cerebrovascular accident left corona radiata 6/13/2023.  Hypertension.  Hyperlipidemia.  Glaucoma.  Peripheral neuropathy currently on gabapentin.  Vitamin D deficiency.  Lumbar disc disease status status post MRI 2/2023 with moderate to severe stenosis at L4-5 post epidural block most recently 10/29/2024 with [facility] pain management.  Gastroesophageal reflux disease.  Hearing loss.  Multinodular goiter status post ultrasound x 2.  Osteopenia status post DEXA scan 11/16/2022 with a spine T-score of -1.1 and a hip T-score of -0.8.  Right bundle branch block.  Bilateral carotid artery stenosis status post duplex 6/2023 with less than 50% occlusion.  Insulin resistance with most recent hemoglobin A1c 6.1% 2/18/2025.    PLAN    Plan on admitting the patient to the hospital for observation.  Cardizem drip and cardiology consult.  Continue Eliquis.  Continue all home medications and monitor electrolytes for as needed adjustments.       HISTORY OF PRESENT ILLNESS    The patient is a 89-year-old white female well-known to me who has a fairly new diagnosis of atrial fibrillation.  She initially had a cryptogenic stroke in 2023 but then 6 months later was diagnosed with atrial fibrillation with rapid ventricular response.  She is currently followed by Dr. Irby and her most recent appointment was in January 2025.  Last evening, the patient had been doing well and was awakened in the middle the night with a thumping in her chest and feeling uncomfortable.  She called her family and they brought her to the emergency room where she was found to be in atrial fibrillation with rapid ventricular response.  She did have a mild troponin elevation but otherwise electrolytes and blood count looked fine.    REVIEW OF SYSTEMS      Positive for cardiovascular, neuro, GI otherwise all others negative.    PAST  MEDICAL HISTORY      Please see impression.    FAMILY HISTORY      Mother and father both  with the former dying of leukemia at age 86 and the latter dying of heart disease at age 65.  She has a brother who is alive, daughter who is 66 years old with knee osteoarthritis and a son who 69 with hyperlipidemia, depression, sleep apnea.    SOCIAL HISTORY      Social History     Tobacco Use    Smoking status: Former     Current packs/day: 0.00     Types: Cigarettes     Quit date: 2/3/2024     Years since quittin.0     Passive exposure: Past    Smokeless tobacco: Never   Substance Use Topics    Alcohol use: Not Currently     Alcohol/week: 1.0 standard drink of alcohol     Comment: WITH DINNER       SURGICAL HISTORY      Breast biopsy x 2.  Tonsillectomy.  Colonoscopy by Dr. Luca Mcgowan .  Left wrist repair status post fracture ([facility]) .  EGD ([facility]) 2017.  Cataract/glaucoma surgery ([facility]) .  Right ring DIP ganglion cyst removal ([facility]) 9/10/2018.  Lumbar epidural blocks 3/7/2023, 10/29/2024.    CURRENT MEDICATIONS      Medications Prior to Admission   Medication Sig Dispense Refill Last Dose/Taking    apixaban (Eliquis) 5 MG tablet tablet Take 1 tablet by mouth Every 12 (Twelve) Hours. 180 tablet 3 Taking    atorvastatin (LIPITOR) 80 MG tablet Take 1 tablet by mouth Every Night. 90 tablet 3 Taking    calcium carbonate (OS-BALWINDER) 600 MG tablet Take 1 tablet by mouth Daily.   Taking    carboxymethylcellulose (REFRESH PLUS) 0.5 % solution Administer 1 drop to both eyes 2 (Two) Times a Day As Needed for Dry Eyes.   Taking As Needed    cholecalciferol (VITAMIN D3) 1000 units tablet Take 1 tablet by mouth Daily.   Taking    digoxin (LANOXIN) 125 MCG tablet Take 0.5 tablets by mouth Daily. 90 tablet 1 Taking    gabapentin (NEURONTIN) 100 MG capsule Take 2 capsules by mouth Every Night.   Taking    latanoprost (XALATAN) 0.005 % ophthalmic solution Apply 1 drop to eye(s) as directed by provider  "Every Night.   Taking    losartan (COZAAR) 50 MG tablet Take 1 tablet by mouth Daily. 90 tablet 3 Taking    pantoprazole (PROTONIX) 40 MG EC tablet Take 1 tablet by mouth Every Morning. (Patient taking differently: Take 1 tablet by mouth Daily.) 90 tablet 0 Taking Differently    timolol (TIMOPTIC) 0.5 % ophthalmic solution Administer 1 drop to both eyes Every Morning.   Taking    triamcinolone (KENALOG) 0.5 % ointment Apply 1 Application topically to the appropriate area as directed 2 (Two) Times a Day. Apply to affected area am and pm for rash (Patient taking differently: Apply 1 Application topically to the appropriate area as directed 2 (Two) Times a Day.) 15 g 0 Taking Differently       ALLERGIES      No Known Allergies    IMMUNIZATIONS    Tdap 7/18/2017.  Shingrix 5/11/2019, 11/1/2019.  Prevnar 20 10/02/2023.  Prevnar 13 7/6/2015.  Pneumovax 20 31/01/2002.  Hepatitis A 5/22/2018, 11/26/2018.    PHYSICAL EXAM      VITAL SIGNS: Blood pressure 104/79, pulse (!) 135, temperature 98.1 °F (36.7 °C), temperature source Oral, resp. rate 16, height 172.7 cm (68\"), weight 76.5 kg (168 lb 11.2 oz), SpO2 93%, not currently breastfeeding.     Constitutional:  Well developed, Well nourished, No acute distress, Non-toxic appearance.   HENT:  Normocephalic, Atraumatic, Bilateral external ears normal, Oropharynx moist, No oral exudates, Nose normal. Neck- Normal range of motion, No tenderness, Supple, No stridor.   Eyes:  PERRL, EOMI, Conjunctiva normal, No discharge.   Respiratory:  Normal breath sounds, No respiratory distress, No wheezing, No chest tenderness.   Cardiovascular:  Irregular rate and rhythm, No murmurs, No rubs, No gallops.   GI:  Bowel sounds normal, Soft, No tenderness, No masses, No pulsatile masses. No hepatosplenomegaly noted.   Musculoskeletal: Good range of motion in all major joints. No tenderness to palpation or major deformities noted. Back- No tenderness.   Integument:  Warm, Dry, No erythema, No " rash.   Lymphatic:  No lymphadenopathy noted.   Neurologic:  Alert & oriented x 3, Normal motor function, Normal sensory function, No focal deficits noted. Cranial nerves 3-12 were grossly intact.  No clonus.  Psychiatric:  Affect normal, Judgment normal, Mood normal.   Extremities:No cyanosis, clubbing, or edema.    EKG      Reviewed.    LABORATORY    Reviewed.     RADIOLOGY    Reviewed.    Uday Omalley MD   [facility]  Office: [phone].  Cell: [phone].

## 2025-02-21 NOTE — ED NOTES
Nursing report ED to floor  Isabel Busch  89 y.o.  female    HPI :  HPI  Stated Reason for Visit: afib - hr 170+ upon ems arrival  History Obtained From: family, patient    Chief Complaint  Chief Complaint   Patient presents with    Palpitations       Admitting doctor:   Uday Omalley MD    Admitting diagnosis:   The primary encounter diagnosis was Atrial fibrillation with RVR. A diagnosis of Elevated troponin was also pertinent to this visit.    Code status:   Current Code Status       Date Active Code Status Order ID Comments User Context       Prior            Allergies:   Patient has no known allergies.    Isolation:   No active isolations    Intake and Output    Intake/Output Summary (Last 24 hours) at 2/21/2025 0739  Last data filed at 2/21/2025 0457  Gross per 24 hour   Intake 150 ml   Output --   Net 150 ml       Weight:       02/21/25  0503   Weight: 77.1 kg (170 lb)       Most recent vitals:   Vitals:    02/21/25 0600 02/21/25 0614 02/21/25 0634 02/21/25 0703   BP: 97/63  101/72 117/65   Pulse: (!) 129 (!) 135 (!) 126 (!) 121   Resp:  16 16    Temp:       SpO2:       Weight:       Height:           Active LDAs/IV Access:   Lines, Drains & Airways       Active LDAs       Name Placement date Placement time Site Days    Peripheral IV 02/21/25 0457 Anterior;Left Forearm 02/21/25  0457  Forearm  less than 1    Peripheral IV 02/21/25 0458 Anterior;Right Forearm 02/21/25  0458  Forearm  less than 1                    Labs (abnormal labs have a star):   Labs Reviewed   COMPREHENSIVE METABOLIC PANEL - Abnormal; Notable for the following components:       Result Value    Glucose 132 (*)     Creatinine 0.53 (*)     Total Protein 5.7 (*)     BUN/Creatinine Ratio 34.0 (*)     All other components within normal limits    Narrative:     GFR Categories in Chronic Kidney Disease (CKD)      GFR Category          GFR (mL/min/1.73)    Interpretation  G1                     90 or greater         Normal or high  (1)  G2                      60-89                Mild decrease (1)  G3a                   45-59                Mild to moderate decrease  G3b                   30-44                Moderate to severe decrease  G4                    15-29                Severe decrease  G5                    14 or less           Kidney failure          (1)In the absence of evidence of kidney disease, neither GFR category G1 or G2 fulfill the criteria for CKD.    eGFR calculation 2021 CKD-EPI creatinine equation, which does not include race as a factor   TROPONIN - Abnormal; Notable for the following components:    HS Troponin T 23 (*)     All other components within normal limits    Narrative:     High Sensitive Troponin T Reference Range:  <14.0 ng/L- Negative Female for AMI  <22.0 ng/L- Negative Male for AMI  >=14 - Abnormal Female indicating possible myocardial injury.  >=22 - Abnormal Male indicating possible myocardial injury.   Clinicians would have to utilize clinical acumen, EKG, Troponin, and serial changes to determine if it is an Acute Myocardial Infarction or myocardial injury due to an underlying chronic condition.        PROTIME-INR - Abnormal; Notable for the following components:    Protime 16.0 (*)     INR 1.29 (*)     All other components within normal limits   DIGOXIN LEVEL - Abnormal; Notable for the following components:    Digoxin 0.40 (*)     All other components within normal limits   CBC WITH AUTO DIFFERENTIAL - Abnormal; Notable for the following components:    Lymphocyte % 15.6 (*)     All other components within normal limits   HIGH SENSITIVITIY TROPONIN T 1HR - Abnormal; Notable for the following components:    HS Troponin T 27 (*)     All other components within normal limits    Narrative:     High Sensitive Troponin T Reference Range:  <14.0 ng/L- Negative Female for AMI  <22.0 ng/L- Negative Male for AMI  >=14 - Abnormal Female indicating possible myocardial injury.  >=22 - Abnormal Male indicating  possible myocardial injury.   Clinicians would have to utilize clinical acumen, EKG, Troponin, and serial changes to determine if it is an Acute Myocardial Infarction or myocardial injury due to an underlying chronic condition.        MAGNESIUM - Normal   PHOSPHORUS - Normal   BNP (IN-HOUSE) - Normal    Narrative:     This assay is used as an aid in the diagnosis of individuals suspected of having heart failure. It can be used as an aid in the diagnosis of acute decompensated heart failure (ADHF) in patients presenting with signs and symptoms of ADHF to the emergency department (ED). In addition, NT-proBNP of <300 pg/mL indicates ADHF is not likely.    Age Range Result Interpretation  NT-proBNP Concentration (pg/mL:      <50             Positive            >450                   Gray                 300-450                    Negative             <300    50-75           Positive            >900                  Gray                300-900                  Negative            <300      >75             Positive            >1800                  Gray                300-1800                  Negative            <300   APTT - Normal   CBC AND DIFFERENTIAL    Narrative:     The following orders were created for panel order CBC & Differential.  Procedure                               Abnormality         Status                     ---------                               -----------         ------                     CBC Auto Differential[[id number]]        Abnormal            Final result                 Please view results for these tests on the individual orders.       EKG:   ECG 12 Lead Tachycardia   Preliminary Result   HEART IMLB=582  bpm   RR Lpabscqr=429  ms   VA Interval=  ms   P Horizontal Axis=  deg   P Front Axis=  deg   QRSD Svwgnwys=988  ms   QT Sfmsijta=832  ms   RXwK=489  ms   QRS Axis=66  deg   T Wave Axis=24  deg   - ABNORMAL ECG -   Atrial fibrillation   Right bundle branch block   Borderline ST depression,  lateral leads   Date and Time of Study:2025 05:23:09          Meds given in ED:   Medications   dilTIAZem (CARDIZEM) 100 mg in 100 mL NS infusion (ADV) (10 mg/hr Intravenous Rate/Dose Change 25 0630)       Imaging results:  XR Chest 1 View    Result Date: 2025  No active disease.  This report was finalized on 2025 6:30 AM by Teja Lane MD on Workstation: [id number]       Ambulatory status:   - up at tiana    Social issues:   Social History     Socioeconomic History    Marital status:    Tobacco Use    Smoking status: Former     Current packs/day: 0.00     Types: Cigarettes     Quit date: 2/3/2024     Years since quittin.0     Passive exposure: Past    Smokeless tobacco: Never   Vaping Use    Vaping status: Never Used   Substance and Sexual Activity    Alcohol use: Not Currently     Alcohol/week: 1.0 standard drink of alcohol     Comment: WITH DINNER    Drug use: No    Sexual activity: Not Currently       Peripheral Neurovascular  Peripheral Neurovascular (Adult)  Peripheral Neurovascular WDL: WDL    Neuro Cognitive  Neuro Cognitive (Adult)  Cognitive/Neuro/Behavioral WDL: WDL    Learning  Learning Assessment  Learning Readiness and Ability: no barriers identified  Education Provided  Person Taught: patient  Teaching Method: verbal instruction  Teaching Focus: symptom/problem overview, diagnostic test, medication administration  Education Outcome Evaluation: eager to learn, verbalizes understanding    Respiratory       Abdominal Pain       Pain Assessments  Pain (Adult)  (0-10) Pain Rating: Rest: 0    NIH Stroke Scale       Angelita Morgan RN  25 07:39 EST

## 2025-02-22 VITALS
TEMPERATURE: 98.1 F | SYSTOLIC BLOOD PRESSURE: 145 MMHG | RESPIRATION RATE: 16 BRPM | WEIGHT: 168.7 LBS | HEART RATE: 55 BPM | BODY MASS INDEX: 25.57 KG/M2 | HEIGHT: 68 IN | DIASTOLIC BLOOD PRESSURE: 53 MMHG | OXYGEN SATURATION: 93 %

## 2025-02-22 DIAGNOSIS — I48.0 PAROXYSMAL ATRIAL FIBRILLATION: Primary | ICD-10-CM

## 2025-02-22 LAB
ALBUMIN SERPL-MCNC: 3.7 G/DL (ref 3.5–5.2)
ALBUMIN/GLOB SERPL: 1.5 G/DL
ALP SERPL-CCNC: 69 U/L (ref 39–117)
ALT SERPL W P-5'-P-CCNC: 15 U/L (ref 1–33)
ANION GAP SERPL CALCULATED.3IONS-SCNC: 8.5 MMOL/L (ref 5–15)
AST SERPL-CCNC: 19 U/L (ref 1–32)
BILIRUB SERPL-MCNC: 0.4 MG/DL (ref 0–1.2)
BUN SERPL-MCNC: 12 MG/DL (ref 8–23)
BUN/CREAT SERPL: 18.8 (ref 7–25)
CALCIUM SPEC-SCNC: 8.6 MG/DL (ref 8.2–9.6)
CHLORIDE SERPL-SCNC: 103 MMOL/L (ref 98–107)
CO2 SERPL-SCNC: 27.5 MMOL/L (ref 22–29)
CREAT SERPL-MCNC: 0.64 MG/DL (ref 0.57–1)
DEPRECATED RDW RBC AUTO: 42.9 FL (ref 37–54)
DIGOXIN SERPL-MCNC: 0.4 NG/ML (ref 0.6–1.2)
EGFRCR SERPLBLD CKD-EPI 2021: 83.6 ML/MIN/1.73
ERYTHROCYTE [DISTWIDTH] IN BLOOD BY AUTOMATED COUNT: 12.7 % (ref 12.3–15.4)
GEN 5 1HR TROPONIN T REFLEX: 34 NG/L
GLOBULIN UR ELPH-MCNC: 2.4 GM/DL
GLUCOSE SERPL-MCNC: 94 MG/DL (ref 65–99)
HCT VFR BLD AUTO: 34.4 % (ref 34–46.6)
HGB BLD-MCNC: 11.6 G/DL (ref 12–15.9)
MAGNESIUM SERPL-MCNC: 2.5 MG/DL (ref 1.7–2.3)
MCH RBC QN AUTO: 31.1 PG (ref 26.6–33)
MCHC RBC AUTO-ENTMCNC: 33.7 G/DL (ref 31.5–35.7)
MCV RBC AUTO: 92.2 FL (ref 79–97)
PLATELET # BLD AUTO: 201 10*3/MM3 (ref 140–450)
PMV BLD AUTO: 10.9 FL (ref 6–12)
POTASSIUM SERPL-SCNC: 4.1 MMOL/L (ref 3.5–5.2)
PROT SERPL-MCNC: 6.1 G/DL (ref 6–8.5)
QT INTERVAL: 480 MS
QTC INTERVAL: 481 MS
RBC # BLD AUTO: 3.73 10*6/MM3 (ref 3.77–5.28)
SODIUM SERPL-SCNC: 139 MMOL/L (ref 136–145)
TROPONIN T % DELTA: -6
TROPONIN T NUMERIC DELTA: -2 NG/L
TROPONIN T SERPL HS-MCNC: 36 NG/L
WBC NRBC COR # BLD AUTO: 7.02 10*3/MM3 (ref 3.4–10.8)

## 2025-02-22 PROCEDURE — G0378 HOSPITAL OBSERVATION PER HR: HCPCS

## 2025-02-22 PROCEDURE — 83735 ASSAY OF MAGNESIUM: CPT | Performed by: INTERNAL MEDICINE

## 2025-02-22 PROCEDURE — 93010 ELECTROCARDIOGRAM REPORT: CPT | Performed by: INTERNAL MEDICINE

## 2025-02-22 PROCEDURE — 97530 THERAPEUTIC ACTIVITIES: CPT | Performed by: PHYSICAL THERAPIST

## 2025-02-22 PROCEDURE — 97161 PT EVAL LOW COMPLEX 20 MIN: CPT | Performed by: PHYSICAL THERAPIST

## 2025-02-22 PROCEDURE — 99214 OFFICE O/P EST MOD 30 MIN: CPT | Performed by: INTERNAL MEDICINE

## 2025-02-22 PROCEDURE — 80162 ASSAY OF DIGOXIN TOTAL: CPT | Performed by: INTERNAL MEDICINE

## 2025-02-22 PROCEDURE — 85027 COMPLETE CBC AUTOMATED: CPT | Performed by: INTERNAL MEDICINE

## 2025-02-22 PROCEDURE — 93005 ELECTROCARDIOGRAM TRACING: CPT | Performed by: NURSE PRACTITIONER

## 2025-02-22 PROCEDURE — 80053 COMPREHEN METABOLIC PANEL: CPT | Performed by: INTERNAL MEDICINE

## 2025-02-22 PROCEDURE — 84484 ASSAY OF TROPONIN QUANT: CPT | Performed by: INTERNAL MEDICINE

## 2025-02-22 RX ORDER — ATENOLOL 25 MG/1
12.5 TABLET ORAL EVERY 12 HOURS SCHEDULED
Qty: 60 TABLET | Refills: 6 | Status: SHIPPED | OUTPATIENT
Start: 2025-02-22

## 2025-02-22 RX ORDER — LOSARTAN POTASSIUM 25 MG/1
25 TABLET ORAL
Status: DISCONTINUED | OUTPATIENT
Start: 2025-02-22 | End: 2025-02-22 | Stop reason: HOSPADM

## 2025-02-22 RX ORDER — AMIODARONE HYDROCHLORIDE 200 MG/1
200 TABLET ORAL
Qty: 30 TABLET | Refills: 2 | Status: SHIPPED | OUTPATIENT
Start: 2025-02-23

## 2025-02-22 RX ORDER — LOSARTAN POTASSIUM 50 MG/1
25 TABLET ORAL DAILY
Qty: 30 TABLET | Refills: 6 | Status: SHIPPED | OUTPATIENT
Start: 2025-02-22

## 2025-02-22 RX ADMIN — APIXABAN 5 MG: 5 TABLET, FILM COATED ORAL at 08:02

## 2025-02-22 RX ADMIN — TIMOLOL MALEATE 1 DROP: 5 SOLUTION OPHTHALMIC at 06:50

## 2025-02-22 RX ADMIN — Medication 1000 UNITS: at 08:02

## 2025-02-22 RX ADMIN — DIGOXIN 62.5 MCG: 0.12 TABLET ORAL at 12:02

## 2025-02-22 RX ADMIN — PANTOPRAZOLE SODIUM 40 MG: 40 TABLET, DELAYED RELEASE ORAL at 06:50

## 2025-02-22 RX ADMIN — ATENOLOL 12.5 MG: 25 TABLET ORAL at 08:01

## 2025-02-22 RX ADMIN — ANTACID TABLETS 1 TABLET: 500 TABLET, CHEWABLE ORAL at 08:01

## 2025-02-22 RX ADMIN — Medication 10 ML: at 08:02

## 2025-02-22 RX ADMIN — LOSARTAN POTASSIUM 25 MG: 25 TABLET, FILM COATED ORAL at 12:02

## 2025-02-22 RX ADMIN — AMIODARONE HYDROCHLORIDE 200 MG: 200 TABLET ORAL at 08:02

## 2025-02-22 NOTE — NURSING NOTE
AVS printed and reviewed with pt at bedside. IVs removed per order. Educated on discharge instructions, pt verbalized understanding and is stable at time of discharge. Meds delivered to bedside.

## 2025-02-22 NOTE — PLAN OF CARE
Goal Outcome Evaluation:  Plan of Care Reviewed With: patient           Outcome Evaluation: PT EVAL completed. Pt is AO x 4. pt reports independence at home prior to admission, but has assist from caregiver for ADLs. Pt transferred supine to sit with mod indep. Pt transferred sit to stand with supervision and RW. Pt ambulated 200 ft with RW and supervision. Pt transferred sit to supine with mod indep. Pt does require skilled therapy at this time. Recommend DC home with assist    Anticipated Discharge Disposition (PT): home with assist

## 2025-02-22 NOTE — DISCHARGE SUMMARY
[facility] DISCHARGE SUMMARY    DATE OF ADMISSION: 2/21/2025  DATE OF DISCHARGE: 2/22/2025    DISCHARGE DIAGNOSIS:     Atrial fibrillation with rapid ventricular response status post spontaneous conversion with Cardizem drip.  Amiodarone instituted.  Cerebrovascular accident left corona radiata 6/13/2023.  Hypertension.  Hyperlipidemia.  Glaucoma.  Peripheral neuropathy currently on gabapentin.  Vitamin D deficiency.  Lumbar disc disease status post MRI 2/2023 with moderate to severe stenosis at L4-5 status post epidural block most recently 10/29/2024 with [facility] pain management.  Hearing loss.  Gastroesophageal reflux disease.  Multinodular goiter status post ultrasound x 2.  Osteopenia status post DEXA scan 11/16/2022 with a spine T-score -1.1 and a hip T-score of -0.8.  Right bundle branch block.  Bilateral carotid artery stenosis status post duplex 6/2023 with less than 50% occlusion.  Insulin resistance with most recent hemoglobin A1c of 6.1% 2/18/2025.    DISCHARGE MEDICATIONS:    Amiodarone 200 mg 1 p.o. daily.  Eliquis 5 mg 1 p.o. twice daily.  Atenolol 12.5 mg 1 p.o. twice daily.  Losartan 25 mg 1 p.o. daily.  Atorvastatin 80 mg 1 p.o. daily.  Calcium carbonate 600 mg 1 p.o. daily.  Refresh eyedrops 1 to 2 drops in both eyes twice daily as needed for dry eyes.  Vitamin D3 1000 units p.o. daily.  Digoxin 62.5 mg 1 p.o. daily.  Gabapentin 200 mg 1 p.o. daily.  Xalatan 0.005% 1 drop in affected eye every night.  Pantoprazole 40 mg 1 p.o. daily.  Timolol 0.5 mg 1 drop in both eyes every morning.  Kenalog ointment 0.5% apply twice a day as needed for rash.    DISCHARGE DIET:    Regular diet.    DISCHARGE CONDITION:    Fair.    DISCHARGE INSTRUCTIONS:    She is to follow-up with me in 3 days as scheduled.  She is to follow-up with cardiology in 1 to 2 weeks.    HISTORY OF PRESENT ILLNESS:    Please see HPI from dictated summary dated 2/21/2025.    HOSPITAL COURSE:    The patient was admitted to  hospital yesterday with rapid ventricular response and atrial fibrillation.  She was started on a diltiazem drip and spontaneously converted.  Cardiology was going to start IV amiodarone but after conversion, opted to load her slowly with oral amiodarone.  She was started on Tenormin 12.5 mg twice daily and her losartan was held.  This morning her blood pressure was elevated again and I restarted losartan but it 25 mg instead of 50 mg, her home dose.  Once cardiology clears the patient for discharge and confirms the medication regimen for her A-fib, she should be clear for discharge.    IMAGING DONE WHILE IN THE HOSPITAL    XR Chest 1 View    Result Date: 2/21/2025  XR CHEST 1 VW-  DATE OF EXAM: 2/21/2025 6:05 AM  INDICATION: Chest pain.  COMPARISON: Radiographs 2/23/2024, 7/24/2024, and 7/9/2023.  TECHNIQUE: A single portable AP view of the chest was obtained.  FINDINGS: Lordotic positioning. Overlying artifacts. The lungs are clear. No pneumothorax. Cardiomediastinal contours within normal limits. Calcified atherosclerotic disease in the thoracic aorta. Similar-appearing lower thoracic levoscoliosis. No acute osseous abnormality is identified.      No active disease.  This report was finalized on 2/21/2025 6:30 AM by Teja Lane MD on Workstation: [id number]        Uday Omalley MD

## 2025-02-22 NOTE — PROGRESS NOTES
"CC: Paroxysmal atrial fibrillation    Interval History: No new acute events overnight      Vital Signs  Temp:  [97.9 °F (36.6 °C)-98.2 °F (36.8 °C)] 98 °F (36.7 °C)  Heart Rate:  [58-64] 64  Resp:  [16-18] 16  BP: (115-153)/(51-82) 153/53    Intake/Output Summary (Last 24 hours) at 2/22/2025 1418  Last data filed at 2/22/2025 0650  Gross per 24 hour   Intake 720 ml   Output --   Net 720 ml     Flowsheet Rows      Flowsheet Row First Filed Value   Admission Height 170.2 cm (67\") Documented at 02/21/2025 0503   Admission Weight 77.1 kg (170 lb) Documented at 02/21/2025 0503            PHYSICAL EXAM:  General: No acute distress  Resp:NL Rate, symmetric chest expansion,unlabored, clear  CV:NL rate and rhythm, NL PMI, NL S1 and S2, no Murmur, no gallop, no rub, No JVD.   ABD:Nl sounds, no masses or tenderness, nondistended, no guarding or rebound  Neuro: alert,cooperative and oriented  Extr:Normal pedal pulses, No edema or cyanosis, moves all extremities      Results Review:    Results from last 7 days   Lab Units 02/22/25  0631   SODIUM mmol/L 139   POTASSIUM mmol/L 4.1   CHLORIDE mmol/L 103   CO2 mmol/L 27.5   BUN mg/dL 12   CREATININE mg/dL 0.64   GLUCOSE mg/dL 94   CALCIUM mg/dL 8.6     Results from last 7 days   Lab Units 02/22/25  0827 02/22/25  0631 02/21/25  0639   HSTROP T ng/L 34* 36* 27*     Results from last 7 days   Lab Units 02/22/25  0631   WBC 10*3/mm3 7.02   HEMOGLOBIN g/dL 11.6*   HEMATOCRIT % 34.4   PLATELETS 10*3/mm3 201     Results from last 7 days   Lab Units 02/21/25  0524   INR  1.29*   APTT seconds 29.7         Results from last 7 days   Lab Units 02/22/25  0631   MAGNESIUM mg/dL 2.5*         I reviewed the patient's new clinical results.  I personally viewed and interpreted the patient's EKG/Telemetry data        Medication Review:   Meds reviewed       Assessment and Plan:     Paroxysmal atrial fibrillation, this is her 4th admission for symptomatic AF. She does not have a lot of BP for AV " leah blockers (though on losartan) . She is on minimal digoxin due to age and risk of toxicity.  She was subsequently started amiodarone drip  History of stroke  RBBB  Hypertension, hold losartan for now.   Hyperlipidemia   Mild AS, preserved LVEF per echo 12/24/24.     She converted on amiodarone drip.  Started on oral amiodarone yesterday.  No acute events overnight  DC home on amiodarone to prevent A-fib recurrence  She will be seen in office in 1 week for vital signs and EKG check to monitor QTc.  I have discussed patient with her nurse.    Vargas Swenson MD  02/22/25  14:18 EST

## 2025-02-22 NOTE — THERAPY EVALUATION
Patient Name: Isabel Busch  : 1933    MRN: [id number]                              Today's Date: 2025       Admit Date: 2025    Visit Dx:     ICD-10-CM ICD-9-CM   1. Atrial fibrillation with RVR  I48.91 427.31   2. Elevated troponin  R79.89 790.6     Patient Active Problem List   Diagnosis    Closed fracture of radius    S/P ORIF (open reduction internal fixation) fracture    History of radius fracture    History of open reduction and internal fixation (ORIF) procedure    Osteopenia of multiple sites    Acute right-sided weakness    Atrial fibrillation with RVR    Essential hypertension    Mixed hyperlipidemia    GERD without esophagitis    History of stroke    Atrial fibrillation with rapid ventricular response     Past Medical History:   Diagnosis Date    A-fib     GERD (gastroesophageal reflux disease)     Glaucoma     Hearing loss     Hyperlipidemia     Hypertension     Low back pain     Multiple gastric polyps     Osteopenia     Peripheral neuropathy     Stroke      Past Surgical History:   Procedure Laterality Date    BREAST BIOPSY      CATARACT EXTRACTION Bilateral     COLONOSCOPY      ENDOSCOPY N/A 2017    Esophagus dilated, Multiple gastric polyps    EPIDURAL BLOCK      ORIF WRIST FRACTURE Left 2016    Procedure: ORIF LEFT RADIUS;  Surgeon: Juan M Herrera MD;  Location: [facility] OR [facility];  Service:       General Information       Row Name 25 1016          Physical Therapy Time and Intention    Document Type evaluation  -LC     Mode of Treatment individual therapy;physical therapy  -LC       Row Name 25 1016          General Information    Patient Profile Reviewed yes  -LC     Prior Level of Function independent:;min assist:;gait;transfer;all household mobility;community mobility;bed mobility;ADL's  -LC     Existing Precautions/Restrictions fall  -LC       Row Name 25 1016          Living Environment    People in Home alone;other (see comments)   caregiver assist  -       Row Name 02/22/25 1016          Home Main Entrance    Number of Stairs, Main Entrance none  -       Row Name 02/22/25 1016          Cognition    Orientation Status (Cognition) oriented x 4  -       Row Name 02/22/25 1016          Safety Issues/Impairments Affecting Functional Mobility    Impairments Affecting Function (Mobility) balance;endurance/activity tolerance  -               User Key  (r) = Recorded By, (t) = Taken By, (c) = Cosigned By      Initials Name Provider Type    Marlon Phelps, PT DPT Physical Therapist                   Mobility       Row Name 02/22/25 1016          Bed Mobility    Bed Mobility bed mobility (all) activities  -     All Activities, Russell (Bed Mobility) modified independence  -     Assistive Device (Bed Mobility) bed rails  -       Row Name 02/22/25 1016          Sit-Stand Transfer    Sit-Stand Russell (Transfers) supervision  -     Assistive Device (Sit-Stand Transfers) walker, front-wheeled  -       Row Name 02/22/25 1016          Gait/Stairs (Locomotion)    Russell Level (Gait) supervision  -     Assistive Device (Gait) walker, front-wheeled  -     Patient was able to Ambulate yes  -     Distance in Feet (Gait) 200  -LC     Deviations/Abnormal Patterns (Gait) stride length decreased;weight shifting decreased  -               User Key  (r) = Recorded By, (t) = Taken By, (c) = Cosigned By      Initials Name Provider Type    Marlon Phelps, PT ROCKY Physical Therapist                   Obj/Interventions       Row Name 02/22/25 1017          Range of Motion Comprehensive    General Range of Motion no range of motion deficits identified  -[facility] Name 02/22/25 1017          Strength Comprehensive (MMT)    General Manual Muscle Testing (MMT) Assessment no strength deficits identified  -               User Key  (r) = Recorded By, (t) = Taken By, (c) = Cosigned By      Initials Name Provider Type      Marlon Banks, PT DPT Physical Therapist                   Goals/Plan    No documentation.                  Clinical Impression       Row Name 02/22/25 1017          Pain    Pretreatment Pain Rating 0/10 - no pain  -LC     Posttreatment Pain Rating 0/10 - no pain  -LC       Row Name 02/22/25 1017          Plan of Care Review    Plan of Care Reviewed With patient  -LC     Outcome Evaluation PT EVAL completed. Pt is AO x 4. pt reports independence at home prior to admission, but has assist from caregiver for ADLs. Pt transferred supine to sit with mod indep. Pt transferred sit to stand with supervision and RW. Pt ambulated 200 ft with RW and supervision. Pt transferred sit to supine with mod indep. Pt does require skilled therapy at this time. Recommend DC home with assist  -LC       Row Name 02/22/25 1017          Therapy Assessment/Plan (PT)    Patient/Family Therapy Goals Statement (PT) home with assist  -LC     Rehab Potential (PT) good  -LC     Criteria for Skilled Interventions Met (PT) yes;skilled treatment is necessary  -LC     Therapy Frequency (PT) evaluation only  -LC       Row Name 02/22/25 1017          Positioning and Restraints    Pre-Treatment Position in bed  -LC     Post Treatment Position bed  -LC     In Bed notified nsg;supine;call light within reach;encouraged to call for assist;patient within staff view;side rails up x2  -LC               User Key  (r) = Recorded By, (t) = Taken By, (c) = Cosigned By      Initials Name Provider Type    LC Marlon Banks, PT DPT Physical Therapist                   Outcome Measures       Row Name 02/22/25 1022 02/22/25 0802       How much help from another person do you currently need...    Turning from your back to your side while in flat bed without using bedrails? 4  -LC 4  -IB    Moving from lying on back to sitting on the side of a flat bed without bedrails? 4  -LC 4  -IB    Moving to and from a bed to a chair (including a wheelchair)? 4  -LC 4  -IB     Standing up from a chair using your arms (e.g., wheelchair, bedside chair)? 4  - 4  -IB    Climbing 3-5 steps with a railing? 3  - 3  -IB    To walk in hospital room? 4  - 4  -IB    AM-PAC 6 Clicks Score (PT) 23  - 23  -IB    Highest Level of Mobility Goal 7 --> Walk 25 feet or more  - 7 --> Walk 25 feet or more  -IB      Row Name 02/22/25 1022          Functional Assessment    Outcome Measure Options AM-PAC 6 Clicks Basic Mobility (PT)  -               User Key  (r) = Recorded By, (t) = Taken By, (c) = Cosigned By      Initials Name Provider Type    IB Jose Garrett, RN Registered Nurse    Marlon Phelps, PT DPT Physical Therapist                                 Physical Therapy Education       Title: PT OT SLP Therapies (Done)       Topic: Physical Therapy (Done)       Point: Mobility training (Done)       Learning Progress Summary            Patient Acceptance, E,D, DU,VU by  at 2/22/2025 1022                      Point: Home exercise program (Done)       Learning Progress Summary            Patient Acceptance, E,D, DU,VU by  at 2/22/2025 1022                      Point: Body mechanics (Done)       Learning Progress Summary            Patient Acceptance, E,D, DU,VU by  at 2/22/2025 1022                      Point: Precautions (Done)       Learning Progress Summary            Patient Acceptance, E,D, DU,VU by  at 2/22/2025 1022                                      User Key       Initials Effective Dates Name Provider Type Discipline     07/11/23 -  Marlon Banks, PT DPT Physical Therapist PT                  PT Recommendation and Plan     Outcome Evaluation: PT EVAL completed. Pt is AO x 4. pt reports independence at home prior to admission, but has assist from caregiver for ADLs. Pt transferred supine to sit with mod indep. Pt transferred sit to stand with supervision and RW. Pt ambulated 200 ft with RW and supervision. Pt transferred sit to supine with mod indep. Pt does require  skilled therapy at this time. Recommend DC home with assist     Time Calculation:         PT Charges       Row Name 02/22/25 1024             Time Calculation    Start Time 0900  -LC      Stop Time 0930  -LC      Time Calculation (min) 30 min  -LC      PT Received On 02/22/25  -LC         Time Calculation- PT    Total Timed Code Minutes- PT 30 minute(s)  -LC         Timed Charges    14887 - PT Therapeutic Activity Minutes 15  -LC         Total Minutes    Timed Charges Total Minutes 15  -LC       Total Minutes 15  -LC                User Key  (r) = Recorded By, (t) = Taken By, (c) = Cosigned By      Initials Name Provider Type    Marlon Phelps, PT DPT Physical Therapist                  Therapy Charges for Today       Code Description Service Date Service Provider Modifiers Qty    81901910250 HC PT THERAPEUTIC ACT EA 15 MIN 2/22/2025 Marlon Banks, PT DPT GP 1    97592106737 HC PT EVAL LOW COMPLEXITY 1 2/22/2025 Marlon Banks, PT DPT GP 1            PT G-Codes  Outcome Measure Options: AM-PAC 6 Clicks Basic Mobility (PT)  AM-PAC 6 Clicks Score (PT): 23  PT Discharge Summary  Anticipated Discharge Disposition (PT): home with assist    Marlon Banks, PT DPT  2/22/2025

## 2025-02-23 NOTE — CASE MANAGEMENT/SOCIAL WORK
Case Management Discharge Note      Final Note: Pt discharged home on 2/22.  GODFREY Roper RN         Selected Continued Care - Discharged on 2/22/2025 Admission date: 2/21/2025 - Discharge disposition: Home or Self Care      Destination    No services have been selected for the patient.                Durable Medical Equipment    No services have been selected for the patient.                Dialysis/Infusion    No services have been selected for the patient.                Home Medical Care    No services have been selected for the patient.                Therapy    No services have been selected for the patient.                Community Resources    No services have been selected for the patient.                Community & DME    No services have been selected for the patient.                    Transportation Services  Private: Car    Final Discharge Disposition Code: 01 - home or self-care

## 2025-05-09 ENCOUNTER — OFFICE VISIT (OUTPATIENT)
Dept: CARDIOLOGY | Age: OVER 89
End: 2025-05-09
Payer: MEDICARE

## 2025-05-09 VITALS
SYSTOLIC BLOOD PRESSURE: 156 MMHG | HEIGHT: 68 IN | WEIGHT: 166 LBS | DIASTOLIC BLOOD PRESSURE: 60 MMHG | BODY MASS INDEX: 25.16 KG/M2 | OXYGEN SATURATION: 94 % | HEART RATE: 55 BPM

## 2025-05-09 DIAGNOSIS — E78.2 MIXED HYPERLIPIDEMIA: ICD-10-CM

## 2025-05-09 DIAGNOSIS — I63.9 CEREBROVASCULAR ACCIDENT (CVA), UNSPECIFIED MECHANISM: ICD-10-CM

## 2025-05-09 DIAGNOSIS — Z86.73 HISTORY OF STROKE: ICD-10-CM

## 2025-05-09 DIAGNOSIS — I48.0 PAROXYSMAL ATRIAL FIBRILLATION: Primary | ICD-10-CM

## 2025-05-09 DIAGNOSIS — I10 ESSENTIAL HYPERTENSION: ICD-10-CM

## 2025-05-09 RX ORDER — AMIODARONE HYDROCHLORIDE 200 MG/1
200 TABLET ORAL
Qty: 90 TABLET | Refills: 1 | Status: SHIPPED | OUTPATIENT
Start: 2025-05-09

## 2025-05-09 NOTE — PROGRESS NOTES
"Date of Office Visit: 25  Encounter Provider: LEO Geller  Place of Service: [facility] CARDIOLOG[facility]  Patient Name: Isabel Busch  :1933    Chief Complaint   Patient presents with    Atrial fibrillation with RVR    Follow-up   :     HPI: Isabel Busch is a 89 y.o. female  with hypertension, hyperlipidemia, right bundle branch block, carotid artery disease, paroxysmal atrial fibrillation, GERD, stroke, gastric polyps, glaucoma, osteopenia and hearing loss.  She is a former smoker.     She is followed by Dr. Xenia Irby.  I will visit with her in follow-up and have reviewed her medical record.     In 2023 she had echocardiogram showing low-normal left ventricular systolic function at 50%, grade 2 diastolic dysfunction, mitral annular calcification with mild mitral valve stenosis and negative saline study.  Carotid duplex showed bilateral stenosis less than 50%.     In July she was found to have new onset atrial fibrillation.  She was loaded with digoxin as her blood pressure did not tolerate diltiazem.  Given CHADS2 Vascor of 7 she was anticoagulated with Eliquis and aspirin was discontinued.  She was continued on both Eliquis and clopidogrel at that time.     She presented to the hospital with complaint of chest pain that woke her up from her sleep.  She also had palpitations and a \"uncomfortable feeling\".  Her blood pressure was low and her heart rate was in the 1 50-1 60s.  High-sensitivity troponin was abnormal at 29.  Chest x-ray showed no acute findings.  She was loaded with digoxin and admitted.  She had repeat echocardiogram that showed normal left ventricular systolic function, grade 2 diastolic dysfunction and mild aortic valve stenosis.  Digoxin was decreased to every other day.  Losartan was decreased to 50 mg daily due to low blood pressure.  She ultimately converted back to normal sinus rhythm prior to discharge.    She then was readmitted in " "February 2025 with A-fib with RVR.  She was started on amiodarone drip and transition to oral amiodarone.  She ultimately discharged on amiodarone, atenolol and digoxin.  Losartan was lowered    She presents today for reassessment.  She is maintaining normal sinus rhythm.  No chest pain or shortness of breath or dizziness or palpitation.  No worsening swelling.  She always has a little swelling in her left ankle.  No bleeding issues with Eliquis besides complained that Eliquis is expensive.  Her caregiver, Sarai said in the waiting room during the visit.      No Known Allergies        Family and social history reviewed.     ROS  All other systems were reviewed and are negative          Objective:     Vitals:    05/09/25 1257   BP: 156/60   BP Location: Left arm   Patient Position: Sitting   Cuff Size: Adult   Pulse: 55   SpO2: 94%   Weight: 75.3 kg (166 lb)   Height: 172.7 cm (68\")     Body mass index is 25.24 kg/m².    PHYSICAL EXAM:  Pulmonary:      Effort: Pulmonary effort is normal.      Breath sounds: Normal breath sounds.   Cardiovascular:      Normal rate. Regular rhythm.      Murmurs: There is a grade 2/6 systolic murmur at the URSB.   Edema:     Ankle: trace edema of the left ankle.        Procedures      Current Outpatient Medications   Medication Sig Dispense Refill    amiodarone (PACERONE) 200 MG tablet Take 1 tablet by mouth Daily. 90 tablet 1    apixaban (Eliquis) 5 MG tablet tablet Take 1 tablet by mouth Every 12 (Twelve) Hours. 180 tablet 3    atorvastatin (LIPITOR) 80 MG tablet Take 1 tablet by mouth Every Night. 90 tablet 3    calcium carbonate (OS-BALWINDER) 600 MG tablet Take 1 tablet by mouth Daily.      carboxymethylcellulose (REFRESH PLUS) 0.5 % solution Administer 1 drop to both eyes 2 (Two) Times a Day As Needed for Dry Eyes.      cholecalciferol (VITAMIN D3) 1000 units tablet Take 1 tablet by mouth Daily.      gabapentin (NEURONTIN) 100 MG capsule Take 2 capsules by mouth Every Night.      " latanoprost (XALATAN) 0.005 % ophthalmic solution Apply 1 drop to eye(s) as directed by provider Every Night.      losartan (COZAAR) 50 MG tablet Take 0.5 tablets by mouth Daily. 30 tablet 6    pantoprazole (PROTONIX) 40 MG EC tablet Take 1 tablet by mouth Every Morning. (Patient taking differently: Take 1 tablet by mouth Daily.) 90 tablet 0    timolol (TIMOPTIC) 0.5 % ophthalmic solution Administer 1 drop to both eyes Every Morning.      triamcinolone (KENALOG) 0.5 % ointment Apply 1 Application topically to the appropriate area as directed 2 (Two) Times a Day. Apply to affected area am and pm for rash 15 g 0     No current facility-administered medications for this visit.     Assessment:       Diagnosis Plan   1. Paroxysmal atrial fibrillation        2. Essential hypertension        3. Mixed hyperlipidemia        4. History of stroke        5. Cerebrovascular accident (CVA), unspecified mechanism             No orders of the defined types were placed in this encounter.        Plan:     1.  89-year-old female with paroxysmal atrial fibrillation, diagnosed July 2023.  CHADS2 Vascor of 6 anticoagulated with Eliquis 5 mg twice daily.  She will continue amiodarone 200 mg daily.  She is no longer on digoxin or atenolol.  -Follow-up with me in 3 months.  2.  Stroke June 2023-she is continued on Eliquis milligram twice daily  3.  Bilateral carotid artery stenosis less than 50% on duplex June 2023-continue Eliquis.  4.  Mild mitral stenosis on echo 2023.  No significant stenosis or regurgitation on last echo December 2024  5.  Right bundle branch block  6.  Hard of hearing  7.  Hypertension-blood pressure slightly high but has recently been a little lower so I would follow clinically at this time.  She is on losartan 25 mg daily and if her blood pressure stays high we may need to raise that back up now that she is no longer on atenolol  8.  Mild aortic valve stenoses on echo December 2024  9.  Osteopenia  10. Right wrist  skin irritation-improved with triamcinolone ointment  11.  Dermatology issue-she had a place removed from her nose and may need Mohs procedure.  Reportedly following with Sandra Bishop     Call with questions or concerns.          It has been a pleasure to participate in this patient's care.      Thank you,  LEO Geller      **I used Dragon to dictate this note:**

## 2025-06-13 ENCOUNTER — HOSPITAL ENCOUNTER (OUTPATIENT)
Dept: GENERAL RADIOLOGY | Facility: HOSPITAL | Age: OVER 89
Discharge: HOME | End: 2025-06-13
Payer: MEDICARE

## 2025-06-13 ENCOUNTER — TELEPHONE (OUTPATIENT)
Dept: NEUROLOGY | Facility: CLINIC | Age: OVER 89
End: 2025-06-13
Payer: MEDICARE

## 2025-06-13 DIAGNOSIS — M54.50 NONSPECIFIC LOW BACK PAIN: ICD-10-CM

## 2025-06-13 PROCEDURE — 72110 X-RAY EXAM L-2 SPINE 4/>VWS: CPT

## 2025-06-13 NOTE — TELEPHONE ENCOUNTER
LVM to Pt about new r/s appt  on 7/23 @ 2:30 with Patricia Yun due to provider being out of office.     SENT [employer] Message     SENT letter

## 2025-06-16 ENCOUNTER — TRANSCRIBE ORDERS (OUTPATIENT)
Dept: ADMINISTRATIVE | Facility: HOSPITAL | Age: OVER 89
End: 2025-06-16
Payer: MEDICARE

## 2025-06-16 DIAGNOSIS — S32.010A CLOSED COMPRESSION FRACTURE OF BODY OF L1 VERTEBRA: ICD-10-CM

## 2025-06-16 DIAGNOSIS — M54.50 LOW BACK PAIN, UNSPECIFIED BACK PAIN LATERALITY, UNSPECIFIED CHRONICITY, UNSPECIFIED WHETHER SCIATICA PRESENT: Primary | ICD-10-CM

## 2025-06-24 ENCOUNTER — TELEPHONE (OUTPATIENT)
Dept: ORTHOPEDIC SURGERY | Facility: CLINIC | Age: OVER 89
End: 2025-06-24

## 2025-06-24 NOTE — TELEPHONE ENCOUNTER
Caller: NICHOLE     Relationship to patient: CAREGIVER     Best call back number: [phone]    Patient is needing: MISSED CALL FOR COMPRESSION FRACTURE THAT NEEDS TO BE SEEN - NO REFERRAL OR TE - PLEASE ADVISE

## 2025-06-27 ENCOUNTER — OFFICE VISIT (OUTPATIENT)
Dept: ORTHOPEDIC SURGERY | Facility: CLINIC | Age: OVER 89
End: 2025-06-27
Payer: MEDICARE

## 2025-06-27 VITALS — HEIGHT: 67 IN | WEIGHT: 163.6 LBS | BODY MASS INDEX: 25.68 KG/M2 | TEMPERATURE: 98.6 F

## 2025-06-27 DIAGNOSIS — S32.020S COMPRESSION FRACTURE OF L2 VERTEBRA, SEQUELA: Primary | ICD-10-CM

## 2025-06-27 RX ORDER — TRAMADOL HYDROCHLORIDE 50 MG/1
TABLET ORAL
COMMUNITY
Start: 2025-06-16

## 2025-06-27 RX ORDER — IBUPROFEN 200 MG
200 TABLET ORAL EVERY 6 HOURS PRN
COMMUNITY

## 2025-06-27 NOTE — PROGRESS NOTES
Patient Name: Isabel Busch   YOB: 1933  Referring Primary Care Physician: Uday Omalley MD      Chief Complaint:    Chief Complaint   Patient presents with    Lumbar Spine - Pain, Follow-up        HPI:  Isabel Busch is a 89 y.o. female who presents to [facility] for follow-up of low back pain.  Since last visit, her caregiver noticed that she was walking bent sideways.  She is also complaining of persistent low back pain.  She ended up having lumbar plain films that raise question for L1 compression fracture.  She had a follow-up CT that indicates it is acute to subacute.  She denies any pain radiating down lower extremities.  She denies any bowel or bladder dysfunction.  She is accompanied by her daughter and caregiver today.  She ambulates with a rolling walker.    PFSH:  See attached    ROS: As per HPI, otherwise negative    Objective:      89 y.o. female  Body mass index is 25.62 kg/m²., 74.2 kg (163 lb 9.6 oz), @HT@  Vitals:    06/27/25 0934   Temp: 98.6 °F (37 °C)     Pain Score    06/27/25 0934   PainSc: 7    PainLoc: Back            Spine Musculoskeletal Exam    Gait    Assistive device: walker    Strength    Thoracolumbar    Thoracolumbar motor exam is normal.       Sensory    Thoracolumbar    Thoracolumbar sensation is normal.    Special Tests    Thoracolumbar      Right      SLR: no back or leg pain      Left      SLR: no back or leg pain        IMAGING:     No imaging in office today.  Lumbar plain films at [facility] as well as lumbar CT at [facility] were reviewed with the patient and reveals multilevel spondylosis contributing to varying degrees of central and foraminal narrowing, acute to subacute L2 vertebral fracture with about 30% loss of mid body height.        Assessment:           Diagnoses and all orders for this visit:    1. Compression fracture of L2 vertebra, sequela (Primary)             Plan:  She is awaiting a lumbar support  brace to be delivered to her home today.  We discussed utilizing it when up and active, she does not need to wear it sleeping.  Advised her to use this for the next 6 to 8 weeks while this heals.  She does not have any acute point tenderness or loss of nerve function on exam today, so this should heal on its own and not require intervention such as kyphoplasty.  She was also advised she can try lidocaine patches to her low back for symptomatic relief.  We discussed red flags.  If she has any acute worsening of back pain or any leg symptoms, she is to present to the emergency room otherwise she will follow-up as needed.    Return if symptoms worsen or fail to improve.    EMR Dragon/Transcription Disclaimer:   Much of this encounter note is an electronic transcription/translation of spoken language to printed text utilizing Dragon dictation.  Red flags have been discussed at this or previous visits to include but not limited weakness in extremities, worsening pain that does not respond to conservative treatment and bowel or bladder dysfunction. These are reasons to present to ER and patient has been informed.    The diagnosis(es), natural history, pathophysiology and treatment for diagnosis(es) were discussed. Opportunity given and questions answered. Biomechanics of pertinent body areas discussed.    EXERCISES:  Advice on benefits of, and types of regular/moderate exercise pertaining to diagnosis.  Continue HEP. For back or neck pain, recommend pilates and or yoga as tolerated. Generally it is best to start any new exercise under the guidance of a  or therapist.   MEDICATIONS:  When prescribe, the risks, benefits, warnings,side effects and alternatives of medications discussed. Advised against long term use of narcotics.   PAIN CONTROL:  Cold, heat, OTC lidocaine patches and/or ointment as needed. Avoid direct skin contact with ice. Ice 15-20 minutes 3-4 times daily as needed. For SI joint pain, recommend ice  bath in water about 50 degrees for 5 consecutive days, add ice slowly to help with adjustment and may cover with warm towel or robe to help with cold tolerance. If using lidocaine, do not apply heat in conjunction as this can cause a burn.   MEDICAL RECORDS reviewed from other provider(s) for past and current medical history pertinent to this visit..

## 2025-07-23 ENCOUNTER — OFFICE VISIT (OUTPATIENT)
Dept: NEUROLOGY | Facility: CLINIC | Age: OVER 89
End: 2025-07-23
Payer: MEDICARE

## 2025-07-23 VITALS
SYSTOLIC BLOOD PRESSURE: 116 MMHG | DIASTOLIC BLOOD PRESSURE: 42 MMHG | BODY MASS INDEX: 25.53 KG/M2 | WEIGHT: 163 LBS | OXYGEN SATURATION: 92 % | HEART RATE: 67 BPM

## 2025-07-23 DIAGNOSIS — Z86.73 HISTORY OF STROKE: ICD-10-CM

## 2025-07-23 DIAGNOSIS — E78.2 MIXED HYPERLIPIDEMIA: ICD-10-CM

## 2025-07-23 DIAGNOSIS — I10 ESSENTIAL HYPERTENSION: ICD-10-CM

## 2025-07-23 DIAGNOSIS — I48.91 ATRIAL FIBRILLATION WITH RVR: ICD-10-CM

## 2025-07-23 DIAGNOSIS — I67.9 CEREBROVASCULAR DISEASE: Primary | ICD-10-CM

## 2025-07-23 NOTE — PROGRESS NOTES
DOS: 2025  NAME: Isabel Busch   : 1933  PCP: Uday Omalley MD    Chief Complaint   Patient presents with    Cerebrovascular disease      SUBJECTIVE  Neurological Problem:  89 y.o. right-handed female with a past medical history of hypertension, hyperlipidemia, AFib (Eliquis), osteopenia, h/o left basal ganglia stroke (2023). They are seen in follow up today for h/o stroke. A summary of the history was taken from the previous note with additions/modifications as indicated. She is unaccompanied.    Interval History:   **For detailed interval history see progress note dated 2023.     Mrs. Busch was seen at [facility] in 2023 for right-sided weakness of the arm and leg, found to have a left basal ganglia infarct felt secondary to small vessel disease.  She was discharged on DAPT aspirin 81 mg and Plavix 75 mg daily then continued on Plavix monotherapy after 21 days along with atorvastatin 40 mg daily.  Approximately 1 month later she was diagnosed with atrial fibrillation with RVR and started on Eliquis 5 mg daily, told to continue clopidogrel 75 mg daily. Due to increased bleeding risk, after consultation with Dr. Odonnell stroke Neurologist, patient's clopidogrel was discontinued and she remained on Eliquis 5 mg BID.     CURRENT History:    She presents today in follow-up and reports that she has experienced no new stroke or stroke-like symptoms.  She continues on Eliquis 5 mg twice daily and has had no issues with bleeding.  She continues on atorvastatin 40 mg nightly and is tolerating it well. She does not regularly monitor blood pressure at home. She does utilize a rollator walker for assistance with ambulation.  She has a caregiver 7 days a week during the day who drives her where she needs to go. She denies any falls. Denies recent hospitalizations. Denies any headache, dizziness, memory concerns.     Recent lab review (2024): lipid panel total 200, TGD 58, HDL  55, ; A1c 5.5%, TSH 1.980.     Review of Systems   Musculoskeletal:  Negative for gait problem.   Neurological:  Positive for numbness. Negative for dizziness, tremors, seizures, syncope, facial asymmetry, speech difficulty, weakness, light-headedness and headaches.   Psychiatric/Behavioral:  Negative for agitation, behavioral problems, confusion, decreased concentration, dysphoric mood, hallucinations, self-injury, sleep disturbance and suicidal ideas. The patient is not nervous/anxious and is not hyperactive.         The following portions of the patient's history were reviewed and updated as appropriate: allergies, current medications, past family history, past medical history, past social history, past surgical history and problem list.    Current Medications:   Current Outpatient Medications:     amiodarone (PACERONE) 200 MG tablet, Take 1 tablet by mouth Daily., Disp: 90 tablet, Rfl: 1    apixaban (Eliquis) 5 MG tablet tablet, Take 1 tablet by mouth Every 12 (Twelve) Hours., Disp: 180 tablet, Rfl: 3    atorvastatin (LIPITOR) 80 MG tablet, Take 1 tablet by mouth Every Night., Disp: 90 tablet, Rfl: 3    calcium carbonate (OS-BALWINDER) 600 MG tablet, Take 1 tablet by mouth Daily., Disp: , Rfl:     carboxymethylcellulose (REFRESH PLUS) 0.5 % solution, Administer 1 drop to both eyes 2 (Two) Times a Day As Needed for Dry Eyes., Disp: , Rfl:     cholecalciferol (VITAMIN D3) 1000 units tablet, Take 1 tablet by mouth Daily., Disp: , Rfl:     gabapentin (NEURONTIN) 100 MG capsule, Take 2 capsules by mouth Every Night., Disp: , Rfl:     ibuprofen (ADVIL,MOTRIN) 200 MG tablet, Take 1 tablet by mouth Every 6 (Six) Hours As Needed for Mild Pain., Disp: , Rfl:     latanoprost (XALATAN) 0.005 % ophthalmic solution, Apply 1 drop to eye(s) as directed by provider Every Night., Disp: , Rfl:     losartan (COZAAR) 50 MG tablet, Take 0.5 tablets by mouth Daily., Disp: 30 tablet, Rfl: 6    pantoprazole (PROTONIX) 40 MG EC tablet,  Take 1 tablet by mouth Every Morning. (Patient taking differently: Take 1 tablet by mouth Daily.), Disp: 90 tablet, Rfl: 0    timolol (TIMOPTIC) 0.5 % ophthalmic solution, Administer 1 drop to both eyes Every Morning., Disp: , Rfl:     traMADol (ULTRAM) 50 MG tablet, TAKE 1 TABLET BY MOUTH TWICE DAILY FOR 15 DAYS AS NEEDED, Disp: , Rfl:     triamcinolone (KENALOG) 0.5 % ointment, Apply 1 Application topically to the appropriate area as directed 2 (Two) Times a Day. Apply to affected area am and pm for rash, Disp: 15 g, Rfl: 0  **I did not stop or change the above medications.  Patient's medication list was updated to reflect medications they have reported as currently taking, including medication changes made by other providers.    Objective   Vital Signs:  /42   Pulse 67   Wt 73.9 kg (163 lb)   SpO2 92%   BMI 25.53 kg/m²   Body mass index is 25.53 kg/m².    Physical Exam   Physical Exam:  GENERAL: NAD, alert    Neurological:   MS: AOx3, recent/remote memory intact, normal attention/concentration, language intact, no neglect.   CN: visual acuity grossly normal, PERRL, EOMI, no nystagmus, no facial droop, no dysarthria  Motor: Moves all four extremities symmetrically and against gravity. Normal tone. No tremor or abnormal movements noted.  Gait and station: Slow and cautious gait, uses rollator walker for assistance.     Result Review :  The following data was reviewed by: LEO Eddy on 07/02/2024:  Laboratory Results:         Lab Results   Component Value Date    WBC 7.02 02/22/2025    HGB 11.6 (L) 02/22/2025    HCT 34.4 02/22/2025    MCV 92.2 02/22/2025     02/22/2025     Lab Results   Component Value Date    GLUCOSE 94 02/22/2025    BUN 12 02/22/2025    CREATININE 0.64 02/22/2025    EGFRIFNONA 108 05/05/2016    BCR 18.8 02/22/2025    K 4.1 02/22/2025    CO2 27.5 02/22/2025    CALCIUM 8.6 02/22/2025    ALBUMIN 3.7 02/22/2025    AST 19 02/22/2025    ALT 15 02/22/2025     Lab Results  "  Component Value Date    HGBA1C 5.50 12/23/2024     Lab Results   Component Value Date    CHOL 200 12/23/2024    CHOL 170 06/14/2023    CHOL 205 (H) 06/12/2023     Lab Results   Component Value Date    HDL 55 12/23/2024    HDL 53 06/14/2023    HDL 56 06/12/2023     Lab Results   Component Value Date     (H) 12/23/2024     (H) 06/14/2023     (H) 06/12/2023     Lab Results   Component Value Date    TRIG 58 12/23/2024    TRIG 60 06/14/2023    TRIG 94 06/12/2023     No results found for: \"RPR\"  Lab Results   Component Value Date    TSH 1.980 12/23/2024     Lab Results   Component Value Date    [id number] 308 06/15/2023            Assessment and Plan   Diagnoses and all orders for this visit:    1. Cerebrovascular disease (Primary)    2. History of stroke    3. Atrial fibrillation with RVR    4. Essential hypertension    5. Mixed hyperlipidemia      Cerebrovascular disease, h/o lacunar left basal ganglia stroke secondary to small vessel disease - control vascular risk factors  Afib: Continue Eliquis 5mg BID - Recommend continued surveillance with Cardiology, PCP  HTN: /42 in clinic today.  Recommend monitoring at home, staying well hydrated and avoiding hypotension.   HLD:  in Dec 2024 .  Continue atorvastatin 40 mg nightly, recommend continued surveillance with PCP.  Last A1C 5.5% December 2024. Recommend continued surveillance with PCP.   Encouraged regular physical activity, healthy diet (e.g. Mediterranean).   Secondary stroke prevention: Ideal targets for stroke prevention would be Blood pressure < 130/80; B12 > 500, TSH in normal range, LDL < 70 and other lipid components with normal range, HbA1c <5.7%. Call 911 for stroke symptoms.    We will see Isabel back as needed, sooner if symptoms warrant.     LEO Eddy  [facility]   07/23/25       Follow Up   No follow-ups on file.  Patient was given instructions and counseling regarding her condition or for health " maintenance advice. Please see specific information pulled into the AVS if appropriate.     I spent 20 minutes caring for the patient on this date of service. This time includes time spent by me in the following activities:preparing for the visit, reviewing tests, obtaining and/or reviewing a separately obtained history, performing a medically appropriate examination and/or evaluation , counseling and educating the patient/family/caregiver, documenting information in the medical record, independently interpreting results and communicating that information with the patient/family/caregiver, and care coordination      Dictated using Dragon Dictation    As of April 2021, as required by the Federal 21st Century Cures Act, medical records (including provider notes and laboratory/imaging results) are to be made available to patient’s and/or their designees as soon as the documents are signed/resulted. While the intention is to ensure transparency and to engage the patient in their healthcare, this immediate access may create unintended consequences as this document uses language intended for communication between medical experts and diagnostic results are interpreted with the entirety of the patient’s clinical picture in mind. It is recommended that patients and/or their designees review all available information with their primary or specialist providers for explanation and guidance to avoid misinterpretation based on layperson understanding, non-medical expert opinions, or Internet searches.

## (undated) DEVICE — CANN NASL CO2 TRULINK W/O2 A/

## (undated) DEVICE — Device: Brand: DEFENDO AIR/WATER/SUCTION AND BIOPSY VALVE

## (undated) DEVICE — BITEBLOCK OMNI BLOC

## (undated) DEVICE — FRCP BX RADJAW4 NDL 2.8 240CM LG OG BX40

## (undated) DEVICE — TUBING, SUCTION, 1/4" X 10', STRAIGHT: Brand: MEDLINE